# Patient Record
Sex: MALE | Race: WHITE | Employment: FULL TIME | URBAN - METROPOLITAN AREA
[De-identification: names, ages, dates, MRNs, and addresses within clinical notes are randomized per-mention and may not be internally consistent; named-entity substitution may affect disease eponyms.]

---

## 2017-01-05 ENCOUNTER — OFFICE VISIT (OUTPATIENT)
Dept: INTERNAL MEDICINE CLINIC | Facility: CLINIC | Age: 52
End: 2017-01-05

## 2017-01-05 VITALS
SYSTOLIC BLOOD PRESSURE: 128 MMHG | RESPIRATION RATE: 16 BRPM | HEART RATE: 84 BPM | HEIGHT: 72 IN | BODY MASS INDEX: 35.76 KG/M2 | WEIGHT: 264 LBS | DIASTOLIC BLOOD PRESSURE: 78 MMHG

## 2017-01-05 DIAGNOSIS — Z51.81 ENCOUNTER FOR THERAPEUTIC DRUG MONITORING: Primary | ICD-10-CM

## 2017-01-05 DIAGNOSIS — E66.9 OBESITY (BMI 30-39.9): ICD-10-CM

## 2017-01-05 DIAGNOSIS — R73.03 PRE-DIABETES: ICD-10-CM

## 2017-01-05 PROCEDURE — 99213 OFFICE O/P EST LOW 20 MIN: CPT | Performed by: INTERNAL MEDICINE

## 2017-01-05 RX ORDER — PHENTERMINE HYDROCHLORIDE 15 MG/1
15 CAPSULE ORAL EVERY MORNING
Qty: 30 CAPSULE | Refills: 2 | Status: CANCELLED | OUTPATIENT
Start: 2017-01-05

## 2017-01-05 RX ORDER — PHENTERMINE HYDROCHLORIDE 37.5 MG/1
37.5 TABLET ORAL
Qty: 30 TABLET | Refills: 0 | Status: SHIPPED | OUTPATIENT
Start: 2017-01-05 | End: 2017-02-02

## 2017-01-05 NOTE — PROGRESS NOTES
CC: Patient presents with:  Weight Check: up 26 lb       HPI:   Obesity, doing well on phentermine, topamax and metformin, no chest pain. Worsening hunger.        Current Outpatient Prescriptions:  Phentermine HCl 37.5 MG Oral Tab Take 1 tablet (37.5 mg Sp lithotripsy may July 2013   • Other and unspecified hyperlipidemia    • Extrinsic asthma, unspecified    • Prediabetes    • Unspecified sleep apnea    • Visual impairment    • Colitis    • Esophageal reflux    • Histoplasmosis 1.2015   • Mycobacterial months, stopped belviq as he didn't think it was helping, stopped wellbutrin due to mood swings, will cont metformin 3 tabs.  Will cont low dose topamax, due to hx of kidney stones, although only one episode in his life time.       The patient indicates und

## 2017-02-01 ENCOUNTER — TELEPHONE (OUTPATIENT)
Dept: INTERNAL MEDICINE CLINIC | Facility: CLINIC | Age: 52
End: 2017-02-01

## 2017-02-02 ENCOUNTER — OFFICE VISIT (OUTPATIENT)
Dept: INTERNAL MEDICINE CLINIC | Facility: CLINIC | Age: 52
End: 2017-02-02

## 2017-02-02 VITALS
WEIGHT: 254 LBS | RESPIRATION RATE: 16 BRPM | HEIGHT: 72 IN | BODY MASS INDEX: 34.4 KG/M2 | HEART RATE: 78 BPM | SYSTOLIC BLOOD PRESSURE: 118 MMHG | DIASTOLIC BLOOD PRESSURE: 78 MMHG

## 2017-02-02 DIAGNOSIS — E66.9 OBESITY (BMI 30-39.9): ICD-10-CM

## 2017-02-02 DIAGNOSIS — R73.03 PRE-DIABETES: ICD-10-CM

## 2017-02-02 DIAGNOSIS — Z51.81 ENCOUNTER FOR THERAPEUTIC DRUG MONITORING: Primary | ICD-10-CM

## 2017-02-02 PROCEDURE — 99213 OFFICE O/P EST LOW 20 MIN: CPT | Performed by: INTERNAL MEDICINE

## 2017-02-02 RX ORDER — PHENTERMINE HYDROCHLORIDE 37.5 MG/1
37.5 TABLET ORAL
Qty: 30 TABLET | Refills: 0 | Status: SHIPPED | OUTPATIENT
Start: 2017-02-02 | End: 2017-03-02

## 2017-02-02 NOTE — PROGRESS NOTES
CC: Patient presents with:  Weight Check: down 10 lb       HPI:   Obesity, doing well on phentermine, topamax and metformin. No chest pain.        Current Outpatient Prescriptions:  Phentermine HCl 37.5 MG Oral Tab Take 1 tablet (37.5 mg total) by lauren • Other and unspecified hyperlipidemia    • Extrinsic asthma, unspecified    • Prediabetes    • Unspecified sleep apnea    • Visual impairment    • Colitis    • Esophageal reflux    • Histoplasmosis 1.2015   • Mycobacterial infection 3/4/2015     2015 metformin. Pt with 38 lbs weight loss total. Will cont phentermine 37.5mg and do monthly for 3 months, stopped belviq as he didn't think it was helping, stopped wellbutrin due to mood swings, will cont metformin 3 tabs.  Will cont low dose topamax, due to h

## 2017-02-03 NOTE — TELEPHONE ENCOUNTER
Requesting topiramate  LOV: 2/2/17  RTC: 1 month  Last Labs: 8/13/16  Filled: 12/14/16  #90 with 0 refills    Future Appointments  Date Time Provider Alba Skinner   3/2/2017 3:15 PM Maxwell Rivera MD [address]   3/30/2017 3:30 PM Maxwell Rivera

## 2017-02-05 RX ORDER — TOPIRAMATE 25 MG/1
TABLET ORAL
Qty: 180 TABLET | Refills: 0 | Status: SHIPPED | OUTPATIENT
Start: 2017-02-05 | End: 2017-07-12

## 2017-02-23 NOTE — TELEPHONE ENCOUNTER
Future Appointments  Date Time Provider Alba Skinner   3/2/2017 3:15 PM Zoe Hollis MD [facility]   3/30/2017 3:30 PM Zoe Hollis MD [facility]   5/25/2017 3:00 PM [facility]     LMOVM to inform pt that

## 2017-02-27 RX ORDER — AMLODIPINE BESYLATE AND BENAZEPRIL HYDROCHLORIDE 10; 40 MG/1; MG/1
1 CAPSULE ORAL DAILY
Qty: 90 CAPSULE | Refills: 1 | Status: SHIPPED | OUTPATIENT
Start: 2017-02-27 | End: 2017-09-13

## 2017-02-27 NOTE — TELEPHONE ENCOUNTER
Requesting Amlodipine  LOV: 8/2016  RTC: none specified   Last Labs: 8/2016  Filled: 8/25/16 #90 with 1 refills    Future Appointments  Date Time Provider Alba Skinner   3/2/2017 3:15 PM Savannah Mckenzie MD [address]   3/30/2017 3:30 PM Angelique

## 2017-03-02 ENCOUNTER — OFFICE VISIT (OUTPATIENT)
Dept: INTERNAL MEDICINE CLINIC | Facility: CLINIC | Age: 52
End: 2017-03-02

## 2017-03-02 VITALS
BODY MASS INDEX: 33.86 KG/M2 | HEART RATE: 68 BPM | RESPIRATION RATE: 16 BRPM | WEIGHT: 250 LBS | SYSTOLIC BLOOD PRESSURE: 122 MMHG | HEIGHT: 72 IN | DIASTOLIC BLOOD PRESSURE: 70 MMHG

## 2017-03-02 DIAGNOSIS — R73.03 PRE-DIABETES: ICD-10-CM

## 2017-03-02 DIAGNOSIS — Z51.81 ENCOUNTER FOR THERAPEUTIC DRUG MONITORING: Primary | ICD-10-CM

## 2017-03-02 DIAGNOSIS — E66.9 OBESITY (BMI 30-39.9): ICD-10-CM

## 2017-03-02 PROCEDURE — 99213 OFFICE O/P EST LOW 20 MIN: CPT | Performed by: INTERNAL MEDICINE

## 2017-03-02 RX ORDER — PHENTERMINE HYDROCHLORIDE 37.5 MG/1
37.5 TABLET ORAL
Qty: 30 TABLET | Refills: 0 | Status: SHIPPED | OUTPATIENT
Start: 2017-03-02 | End: 2017-05-16 | Stop reason: ALTCHOICE

## 2017-03-02 NOTE — PROGRESS NOTES
CC: Patient presents with:  Weight Check: down 4 lb       HPI:   Obesity, doing well on phentermine, jardiance, metformin and topamax. No chest pain.        Current Outpatient Prescriptions:  Phentermine HCl 37.5 MG Oral Tab Take 1 tablet (37.5 mg tot • Asthma 6/19/2014   • Allergic rhinitis 6/19/2014   • Pre-diabetes 6/19/2014   • Kidney stone 6/19/2014     Sp lithotripsy may July 2013   • Other and unspecified hyperlipidemia    • Extrinsic asthma, unspecified    • Prediabetes    • Unspecified sleep (BMI 30-39. 9)  Pre-diabetes     PLAN:  1. Obesity (BMI 30-39.9)/prediabets, pt with 14 lbs wt loss on 2 months of phentermine, jardiance topamax and metformin.  Pt with 42 lbs weight loss total. Will cont phentermine 37.5mg and do monthly for 3 months, stop

## 2017-03-06 NOTE — TELEPHONE ENCOUNTER
Requesting uloric  LOV:  8/19/16  RTC:   Last Labs:   Filled: 11/7/16  # 30 with 5 refills    Future Appointments  Date Time Provider Alba Skinner   3/30/2017 3:30 PM Saeed Garcia MD [address]   5/9/2017 3:15 PM Saeed Garcia MD [facility]

## 2017-03-07 RX ORDER — FEBUXOSTAT 40 MG/1
TABLET ORAL
Qty: 30 TABLET | Refills: 5 | Status: SHIPPED | OUTPATIENT
Start: 2017-03-07 | End: 2017-07-10

## 2017-03-22 NOTE — TELEPHONE ENCOUNTER
Diabetic Medication Protocol Failed3/22 12:49 PM   HgBA1C procedure resulted in past 6 months    Last HgBA1C < 7.5    Appointment in past 6 or next 3 months      Requesting Jardiance  LOV: 3/2/17 wlc   RTC: 4 wks  Last Labs: 8/13/16  Filled: 03/2/17 #30 wi

## 2017-03-30 ENCOUNTER — OFFICE VISIT (OUTPATIENT)
Dept: INTERNAL MEDICINE CLINIC | Facility: CLINIC | Age: 52
End: 2017-03-30

## 2017-03-30 VITALS
SYSTOLIC BLOOD PRESSURE: 122 MMHG | BODY MASS INDEX: 34.4 KG/M2 | DIASTOLIC BLOOD PRESSURE: 60 MMHG | WEIGHT: 254 LBS | HEART RATE: 60 BPM | HEIGHT: 72 IN | RESPIRATION RATE: 16 BRPM

## 2017-03-30 DIAGNOSIS — Z51.81 ENCOUNTER FOR THERAPEUTIC DRUG MONITORING: Primary | ICD-10-CM

## 2017-03-30 DIAGNOSIS — E66.9 OBESITY (BMI 30-39.9): ICD-10-CM

## 2017-03-30 DIAGNOSIS — R73.03 PRE-DIABETES: ICD-10-CM

## 2017-03-30 PROCEDURE — 99213 OFFICE O/P EST LOW 20 MIN: CPT | Performed by: INTERNAL MEDICINE

## 2017-03-30 RX ORDER — PHENTERMINE HYDROCHLORIDE 37.5 MG/1
37.5 TABLET ORAL
Qty: 30 TABLET | Refills: 0 | Status: CANCELLED | OUTPATIENT
Start: 2017-03-30

## 2017-03-30 RX ORDER — PHENTERMINE HYDROCHLORIDE 15 MG/1
15 CAPSULE ORAL EVERY MORNING
Qty: 30 CAPSULE | Refills: 2 | Status: SHIPPED | OUTPATIENT
Start: 2017-03-30 | End: 2017-06-27

## 2017-03-30 NOTE — PROGRESS NOTES
CC: Patient presents with:  Weight Check: up 4 lb       HPI:   Obesity, doing doing well on phentermine, jardiance topamax and metformin. No chest pain.        Current Outpatient Prescriptions:  Phentermine HCl 15 MG Oral Cap Take 1 capsule (15 mg total ([facility] 6/19/2014   • Asthma 6/19/2014   • Allergic rhinitis 6/19/2014   • Pre-diabetes 6/19/2014   • Kidney stone 6/19/2014     Sp lithotripsy may July 2013   • Other and unspecified hyperlipidemia    • Extrinsic asthma, unspecified    • Prediabetes    • Un (primary encounter diagnosis)  Obesity (BMI 30-39. 9)  Pre-diabetes     PLAN:  1. Obesity (BMI 30-39.9)/prediabets, pt with 10 lbs wt loss on 3 months of phentermine, jardiance topamax and metformin.  Pt with 38 lbs weight loss total. Will reduce phentermine

## 2017-04-19 RX ORDER — LOSARTAN POTASSIUM 100 MG/1
TABLET ORAL
Qty: 90 TABLET | Refills: 0 | Status: SHIPPED | OUTPATIENT
Start: 2017-04-19 | End: 2017-08-18

## 2017-04-19 NOTE — TELEPHONE ENCOUNTER
Requesting Losartan  LOV: 8/19/16  RTC: not noted  Last Labs: 8/13/16  Filled: 10/25/16 #90 with 1 refills    Future Appointments  Date Time Provider Alba Skinner   6/27/2017 3:00 PM Mallorie Hodges MD [facility]   7/25/2017 3:00 PM Mallorie Hodges

## 2017-05-16 ENCOUNTER — OFFICE VISIT (OUTPATIENT)
Dept: FAMILY MEDICINE CLINIC | Facility: CLINIC | Age: 52
End: 2017-05-16

## 2017-05-16 VITALS
HEIGHT: 72 IN | WEIGHT: 254 LBS | SYSTOLIC BLOOD PRESSURE: 126 MMHG | HEART RATE: 78 BPM | TEMPERATURE: 98 F | DIASTOLIC BLOOD PRESSURE: 74 MMHG | BODY MASS INDEX: 34.4 KG/M2 | RESPIRATION RATE: 16 BRPM

## 2017-05-16 DIAGNOSIS — M25.521 RIGHT ELBOW PAIN: Primary | ICD-10-CM

## 2017-05-16 PROCEDURE — 99214 OFFICE O/P EST MOD 30 MIN: CPT | Performed by: PHYSICIAN ASSISTANT

## 2017-05-16 RX ORDER — EMPAGLIFLOZIN 25 MG/1
1 TABLET, FILM COATED ORAL DAILY
COMMUNITY
Start: 2017-03-30 | End: 2017-08-24

## 2017-05-16 RX ORDER — HYDROCODONE BITARTRATE AND ACETAMINOPHEN 5; 325 MG/1; MG/1
1 TABLET ORAL EVERY 8 HOURS PRN
Qty: 45 TABLET | Refills: 0 | Status: SHIPPED | OUTPATIENT
Start: 2017-05-16 | End: 2017-09-12 | Stop reason: ALTCHOICE

## 2017-05-16 NOTE — PROGRESS NOTES
[facility] Internal Medicine Progress Note    CC:  Patient presents with:  Arm Pain: right x 2 & 1/2 months. No known injury. Taking Tylenol as needed, no relief.       HPI:   HPI  R arm pain x 2 months  Pt has been having pain in his lower bicep into the lungs every 4 (four) hours as needed for Wheezing. Disp: 1 Inhaler Rfl: 6   Glycerin-Hypromellose- (VISINE TEARS OP) 1 drop by Each eye route 4 (four) times daily as needed.  Disp:  Rfl:    aspirin (ASPIR-81) 81 MG Oral Tab EC Take 81 mg by tendon rupture  Will check MRI of R elbow  Pt to follow-up with ortho, referral given  [place] prn for pain  RTC in 1 month, sooner if problems   No orders of the defined types were placed in this encounter.        Meds & Refills for this Visit:  Signed Presc

## 2017-05-16 NOTE — PATIENT INSTRUCTIONS
Thank you for choosing Bee Ortez PA-C at [place] [address]  To Do: Alba Guerrier to get MRI  2. Start medication as prescribed  3.  Pt to follow-up ortho, referral given    • Please signup for MY CHART, which is electronic access to and to improve your quality of life.     Referrals, and Radiology Information:    If your insurance requires a referral to a specialist, please allow 5 business days to process your referral request.    If Gauri Fong PA-C orders a CT or MRI, it may t

## 2017-05-17 ENCOUNTER — PATIENT MESSAGE (OUTPATIENT)
Dept: INTERNAL MEDICINE CLINIC | Facility: CLINIC | Age: 52
End: 2017-05-17

## 2017-05-17 ENCOUNTER — PATIENT MESSAGE (OUTPATIENT)
Dept: FAMILY MEDICINE CLINIC | Facility: CLINIC | Age: 52
End: 2017-05-17

## 2017-05-18 NOTE — TELEPHONE ENCOUNTER
From: Corrinne Farrier. To: Roni Brown  Sent: 5/17/2017 6:06 PM CDT  Subject: Other    Changing pharmacy to:  [facility]  [address] [facility] [place]

## 2017-05-18 NOTE — TELEPHONE ENCOUNTER
From: Prema Gates. To: Claudette Rodriges MD  Sent: 5/17/2017 6:08 PM CDT  Subject: Other    Changing pharmacy to:  [facility]  [address]

## 2017-05-18 NOTE — TELEPHONE ENCOUNTER
From: Dandre Wood.   To: Elie Davila MD  Sent: 5/17/2017 6:04 PM CDT  Subject: Other    Changing pharmacy to :  [facility]  [address]  [place].

## 2017-05-22 ENCOUNTER — HOSPITAL ENCOUNTER (OUTPATIENT)
Dept: MRI IMAGING | Age: 52
Discharge: HOME | End: 2017-05-22
Attending: PHYSICIAN ASSISTANT
Payer: COMMERCIAL

## 2017-05-22 ENCOUNTER — APPOINTMENT (OUTPATIENT)
Dept: LAB | Age: 52
End: 2017-05-22
Attending: INTERNAL MEDICINE
Payer: COMMERCIAL

## 2017-05-22 DIAGNOSIS — Z51.81 ENCOUNTER FOR THERAPEUTIC DRUG MONITORING: ICD-10-CM

## 2017-05-22 DIAGNOSIS — M25.521 RIGHT ELBOW PAIN: ICD-10-CM

## 2017-05-22 DIAGNOSIS — E66.9 OBESITY (BMI 30-39.9): ICD-10-CM

## 2017-05-22 DIAGNOSIS — R73.03 PRE-DIABETES: ICD-10-CM

## 2017-05-22 PROCEDURE — 73221 MRI JOINT UPR EXTREM W/O DYE: CPT | Performed by: PHYSICIAN ASSISTANT

## 2017-05-22 PROCEDURE — 36415 COLL VENOUS BLD VENIPUNCTURE: CPT | Performed by: INTERNAL MEDICINE

## 2017-05-22 PROCEDURE — 80048 BASIC METABOLIC PNL TOTAL CA: CPT | Performed by: INTERNAL MEDICINE

## 2017-05-22 PROCEDURE — 82306 VITAMIN D 25 HYDROXY: CPT | Performed by: INTERNAL MEDICINE

## 2017-05-22 PROCEDURE — 82607 VITAMIN B-12: CPT | Performed by: INTERNAL MEDICINE

## 2017-05-30 PROBLEM — M77.11 LATERAL EPICONDYLITIS, RIGHT ELBOW: Status: ACTIVE | Noted: 2017-05-30

## 2017-05-30 PROBLEM — M75.21 BICEPS TENDINITIS ON RIGHT: Status: ACTIVE | Noted: 2017-05-30

## 2017-06-27 ENCOUNTER — OFFICE VISIT (OUTPATIENT)
Dept: INTERNAL MEDICINE CLINIC | Facility: CLINIC | Age: 52
End: 2017-06-27

## 2017-06-27 VITALS
WEIGHT: 265 LBS | RESPIRATION RATE: 16 BRPM | SYSTOLIC BLOOD PRESSURE: 130 MMHG | DIASTOLIC BLOOD PRESSURE: 78 MMHG | BODY MASS INDEX: 35.89 KG/M2 | HEIGHT: 72 IN | HEART RATE: 78 BPM

## 2017-06-27 DIAGNOSIS — R73.03 PRE-DIABETES: ICD-10-CM

## 2017-06-27 DIAGNOSIS — E66.9 OBESITY (BMI 30-39.9): ICD-10-CM

## 2017-06-27 DIAGNOSIS — Z51.81 ENCOUNTER FOR THERAPEUTIC DRUG MONITORING: Primary | ICD-10-CM

## 2017-06-27 PROCEDURE — 99213 OFFICE O/P EST LOW 20 MIN: CPT | Performed by: INTERNAL MEDICINE

## 2017-06-27 RX ORDER — PHENTERMINE HYDROCHLORIDE 37.5 MG/1
37.5 TABLET ORAL
Qty: 30 TABLET | Refills: 0 | Status: SHIPPED | OUTPATIENT
Start: 2017-06-27 | End: 2017-08-24

## 2017-06-27 NOTE — PROGRESS NOTES
CC: Patient presents with:  Weight Check: up 11 lb       HPI:   Obesity, doing well on phentermine, metformin, topamax and jardiance. Worsening hunger.        Current Outpatient Prescriptions:  Phentermine HCl 37.5 MG Oral Tab Take 1 tablet (37.5 mg tot lesion 1.2015 sp voriconazole ID fu    • Asthma 6/19/2014   • Cavitary lesion of lung    • Colitis    • Esophageal reflux    • Extrinsic asthma, unspecified    • Gout 8/2/2016   • High cholesterol    • Histoplasmosis 1.2015   • Hyperproteinemia    • Kidney 30-39. 9)  Pre-diabetes     PLAN:  1. Obesity (BMI 30-39.9)/prediabets, pt with 11 lbs wt gain on 3 months of phentermine 15mg, jardiance topamax and metformin.  Pt with 27 lbs weight loss total. Will increase phentermine to 37.5mg and refill monthly for 3 m

## 2017-07-06 ENCOUNTER — TELEPHONE (OUTPATIENT)
Dept: FAMILY MEDICINE CLINIC | Facility: CLINIC | Age: 52
End: 2017-07-06

## 2017-07-06 NOTE — TELEPHONE ENCOUNTER
Requesting Topiramate 25mg  LOV: 6/27/17  RTC: 4 weeks  Last Labs: n/a  Filled: 2/5/17 #180 with 0 refills  PLAN:  1. Obesity (BMI 30-39.9)/prediabets, pt with 11 lbs wt gain on 3 months of phentermine 15mg, jardiance topamax and metformin.  Pt with 27 lbs

## 2017-07-06 NOTE — TELEPHONE ENCOUNTER
Pharmacy req refill        Name of Medication: TOPIRAMATE 25 MG Oral     Dose:     How is medication prescribed: TAKE 1 TABLET TWICE A DAY    Specific name of pharmacy and location:[facility] [address]    Name of mail order

## 2017-07-10 ENCOUNTER — TELEPHONE (OUTPATIENT)
Dept: FAMILY MEDICINE CLINIC | Facility: CLINIC | Age: 52
End: 2017-07-10

## 2017-07-10 NOTE — TELEPHONE ENCOUNTER
From: Prema Gates. Sent: 7/10/2017 1:37 PM CDT  Subject: Medication Renewal Request    Toni Trevino. would like a refill of the following medications:  TOPIRAMATE 25 MG Oral Tab Kenna Bro PA-C]    Preferred pharmacy: [facility] [id number] [place]

## 2017-07-10 NOTE — TELEPHONE ENCOUNTER
From: Armida Puente. Sent: 7/10/2017 1:37 PM CDT  Subject: Medication Renewal Request    Marisabel Ames.  Pasquale Diaz. would like a refill of the following medications:  ULORIC 40 MG Oral Tab Tristan Hardin MD]    Preferred pharmacy: [address]

## 2017-07-11 RX ORDER — FEBUXOSTAT 40 MG/1
40 TABLET, FILM COATED ORAL DAILY
Qty: 90 TABLET | Refills: 3 | Status: SHIPPED
Start: 2017-07-11 | End: 2018-10-10

## 2017-07-11 NOTE — TELEPHONE ENCOUNTER
Time started: 1119    Time ended: 1121    Total time spent on chart: 2 mins    Requesting uloric 40mg  LOV: 6/27/17  RTC: 1 month  Last Labs: 5/22/17  Filled: 3/7/17 # 30 with 5 refills    Future Appointments  Date Time Provider Alba Skinner   7/25/20

## 2017-07-12 RX ORDER — TOPIRAMATE 25 MG/1
25 TABLET ORAL 2 TIMES DAILY
Qty: 180 TABLET | Refills: 0 | Status: SHIPPED
Start: 2017-07-12 | End: 2017-07-25

## 2017-07-12 RX ORDER — TOPIRAMATE 25 MG/1
25 TABLET ORAL 2 TIMES DAILY
Qty: 180 TABLET | Refills: 0
Start: 2017-07-12

## 2017-07-12 NOTE — TELEPHONE ENCOUNTER
From: Vola Phoenix. Sent: 7/12/2017 4:46 PM CDT  Subject: Medication Renewal Request    Jcjewell Pam.  Michael Sheikh. would like a refill of the following medications:  TOPIRAMATE 25 MG Oral Tab Zacarias Tovar PA-C]    Preferred pharmacy: [facility] [address] [place]

## 2017-07-12 NOTE — TELEPHONE ENCOUNTER
Requesting Topiramate  LOV: 6/27/17  RTC: 4 weeks  Last Labs: n/a  Filled: 2/5/17 #180 with 0 refills    Future Appointments  Date Time Provider Alba Skinner   7/25/2017 3:00 PM Claudio Lynn MD [address]   8/24/2017 3:30 PM Claudio Lynn MD

## 2017-07-25 ENCOUNTER — OFFICE VISIT (OUTPATIENT)
Dept: INTERNAL MEDICINE CLINIC | Facility: CLINIC | Age: 52
End: 2017-07-25

## 2017-07-25 VITALS
WEIGHT: 263 LBS | HEART RATE: 84 BPM | BODY MASS INDEX: 35.62 KG/M2 | HEIGHT: 72 IN | SYSTOLIC BLOOD PRESSURE: 122 MMHG | DIASTOLIC BLOOD PRESSURE: 70 MMHG | RESPIRATION RATE: 16 BRPM

## 2017-07-25 DIAGNOSIS — E66.9 OBESITY (BMI 30-39.9): ICD-10-CM

## 2017-07-25 DIAGNOSIS — Z51.81 ENCOUNTER FOR THERAPEUTIC DRUG MONITORING: Primary | ICD-10-CM

## 2017-07-25 DIAGNOSIS — R73.03 PRE-DIABETES: ICD-10-CM

## 2017-07-25 PROCEDURE — 99213 OFFICE O/P EST LOW 20 MIN: CPT | Performed by: INTERNAL MEDICINE

## 2017-07-25 RX ORDER — PHENTERMINE HYDROCHLORIDE 37.5 MG/1
37.5 TABLET ORAL
Qty: 30 TABLET | Refills: 0 | Status: CANCELLED | OUTPATIENT
Start: 2017-07-25

## 2017-07-25 RX ORDER — PHENDIMETRAZINE TARTRATE 105 MG/1
CAPSULE, EXTENDED RELEASE ORAL
Qty: 30 CAPSULE | Refills: 0 | Status: SHIPPED | OUTPATIENT
Start: 2017-07-25 | End: 2017-08-24

## 2017-07-25 RX ORDER — TOPIRAMATE 50 MG/1
50 TABLET, FILM COATED ORAL 2 TIMES DAILY
Qty: 60 TABLET | Refills: 5 | Status: SHIPPED | OUTPATIENT
Start: 2017-07-25 | End: 2017-09-12

## 2017-07-25 NOTE — PROGRESS NOTES
CC: Patient presents with:  Weight Check: down 2 lb       HPI:   Obesity, doing well on phentermine, metformin, topamax and jardiance. Still with cravings. No chest pain.        Current Outpatient Prescriptions:  topiramate 50 MG Oral Tab Take 1 tablet mouth daily.  Disp:  Rfl:       Past Medical History:   Diagnosis Date   • Allergic rhinitis 6/19/2014   • Aspergillosis, with pneumonia ([facility] [address] 2/3/2015    RUL cavitary lesion 1.2015 sp voriconazole ID fu    • Asthma 6/19/2014   • Cavitary lesion of lung Tartrate  MG Oral Capsule SR 24 Hr 30 capsule 0      Sig: Take 1 cap daily          None     ASSESSMENT:   Encounter for therapeutic drug monitoring  (primary encounter diagnosis)  Obesity (BMI 30-39. 9)  Pre-diabetes     PLAN:  1. Obesity (BMI 30-39.

## 2017-07-27 RX ORDER — TOPIRAMATE 25 MG/1
25 TABLET ORAL 2 TIMES DAILY
Qty: 180 TABLET | Refills: 0 | Status: SHIPPED | OUTPATIENT
Start: 2017-07-27 | End: 2017-08-24

## 2017-08-18 ENCOUNTER — TELEPHONE (OUTPATIENT)
Dept: FAMILY MEDICINE CLINIC | Facility: CLINIC | Age: 52
End: 2017-08-18

## 2017-08-18 RX ORDER — LOSARTAN POTASSIUM 100 MG/1
100 TABLET ORAL
Qty: 90 TABLET | Refills: 1 | Status: SHIPPED | OUTPATIENT
Start: 2017-08-18 | End: 2018-02-22

## 2017-08-18 NOTE — TELEPHONE ENCOUNTER
Refill request received via fax for Losartan 100mg  Requesting Losartan 100mg  LOV: 7/25/17  RTC: 4 weeks  Last Labs: 5/22/17  Filled: 4/19/17 #90 with 0 refills    Future Appointments  Date Time Provider Alba Skinner   8/24/2017 3:30 PM Stephen Sewell,

## 2017-08-23 ENCOUNTER — TELEPHONE (OUTPATIENT)
Dept: FAMILY MEDICINE CLINIC | Facility: CLINIC | Age: 52
End: 2017-08-23

## 2017-08-23 DIAGNOSIS — I10 HYPERTENSION, UNSPECIFIED TYPE: ICD-10-CM

## 2017-08-23 DIAGNOSIS — M10.9 GOUT, UNSPECIFIED CAUSE, UNSPECIFIED CHRONICITY, UNSPECIFIED SITE: ICD-10-CM

## 2017-08-23 DIAGNOSIS — R80.9 PROTEINURIA, UNSPECIFIED TYPE: ICD-10-CM

## 2017-08-23 DIAGNOSIS — E29.1 HYPOGONADISM IN MALE: ICD-10-CM

## 2017-08-23 DIAGNOSIS — E78.5 HYPERLIPIDEMIA, UNSPECIFIED HYPERLIPIDEMIA TYPE: Primary | ICD-10-CM

## 2017-08-23 DIAGNOSIS — M13.0 POLYARTICULAR ARTHRITIS: ICD-10-CM

## 2017-08-23 DIAGNOSIS — R73.03 PREDIABETES: ICD-10-CM

## 2017-08-23 NOTE — TELEPHONE ENCOUNTER
Patient notified, verbalized understanding.      Time started: 0928    Time ended: 0930    Total time spent on chart: 2 min

## 2017-08-23 NOTE — TELEPHONE ENCOUNTER
Pt req annual lab orders, has physical scheduled with Dr Jaren Cotton on 9/12/17 and would like to have done prior to apt.

## 2017-08-23 NOTE — TELEPHONE ENCOUNTER
Requesting Lab orders  LOV: 7/25/17  RTC: 4 weeks  Last Labs: some labs 5/17    Future Appointments  Date Time Provider Alba Susanne   8/24/2017 3:30 PM Feliciano Carr MD [facility]   9/12/2017 3:30 PM Braxton Giles MD [facility] [address] [facility] [address]   9/

## 2017-08-24 ENCOUNTER — OFFICE VISIT (OUTPATIENT)
Dept: INTERNAL MEDICINE CLINIC | Facility: CLINIC | Age: 52
End: 2017-08-24

## 2017-08-24 VITALS
HEIGHT: 72 IN | WEIGHT: 260 LBS | SYSTOLIC BLOOD PRESSURE: 118 MMHG | BODY MASS INDEX: 35.21 KG/M2 | RESPIRATION RATE: 16 BRPM | HEART RATE: 88 BPM | DIASTOLIC BLOOD PRESSURE: 78 MMHG

## 2017-08-24 DIAGNOSIS — R73.03 PRE-DIABETES: ICD-10-CM

## 2017-08-24 DIAGNOSIS — Z51.81 ENCOUNTER FOR THERAPEUTIC DRUG MONITORING: Primary | ICD-10-CM

## 2017-08-24 DIAGNOSIS — E66.9 OBESITY (BMI 30-39.9): ICD-10-CM

## 2017-08-24 PROCEDURE — 99213 OFFICE O/P EST LOW 20 MIN: CPT | Performed by: INTERNAL MEDICINE

## 2017-08-24 RX ORDER — PHENDIMETRAZINE TARTRATE 105 MG/1
CAPSULE, EXTENDED RELEASE ORAL
Qty: 30 CAPSULE | Refills: 0 | Status: SHIPPED | OUTPATIENT
Start: 2017-08-24 | End: 2017-09-21

## 2017-08-24 NOTE — PROGRESS NOTES
CC: Patient presents with:  Weight Check: down 3 lb       HPI:   Obesity, doing well on phendimetrazine, topamax and metformin. No chest pain. Stopped jardiance because of freq of urination.        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  Will cont topamax 50mg bid, hx of kidney stones, although only one episode in his lif

## 2017-08-31 RX ORDER — FEBUXOSTAT 40 MG/1
TABLET ORAL
Qty: 30 TABLET | Refills: 5 | OUTPATIENT
Start: 2017-08-31

## 2017-08-31 NOTE — TELEPHONE ENCOUNTER
Medication Detail    Disp Refills Start End    febuxostat (ULORIC) 40 MG Oral Tab 90 tablet 3 7/11/2017     Sig - Route:  Take 1 tablet (40 mg total) by mouth daily. - Oral    Class: Script not printed    E-Prescribing Status: Receipt confirmed by pharmacy

## 2017-09-09 ENCOUNTER — LAB ENCOUNTER (OUTPATIENT)
Dept: LAB | Age: 52
End: 2017-09-09
Attending: INTERNAL MEDICINE
Payer: COMMERCIAL

## 2017-09-09 DIAGNOSIS — M10.9 GOUT, UNSPECIFIED CAUSE, UNSPECIFIED CHRONICITY, UNSPECIFIED SITE: ICD-10-CM

## 2017-09-09 DIAGNOSIS — R80.9 PROTEINURIA, UNSPECIFIED TYPE: ICD-10-CM

## 2017-09-09 DIAGNOSIS — I10 HYPERTENSION, UNSPECIFIED TYPE: ICD-10-CM

## 2017-09-09 DIAGNOSIS — E78.5 HYPERLIPIDEMIA, UNSPECIFIED HYPERLIPIDEMIA TYPE: ICD-10-CM

## 2017-09-09 DIAGNOSIS — E29.1 HYPOGONADISM IN MALE: ICD-10-CM

## 2017-09-09 DIAGNOSIS — M13.0 POLYARTICULAR ARTHRITIS: ICD-10-CM

## 2017-09-09 DIAGNOSIS — R73.03 PREDIABETES: ICD-10-CM

## 2017-09-09 LAB
ALBUMIN SERPL-MCNC: 3.9 G/DL (ref 3.5–4.8)
ALP LIVER SERPL-CCNC: 54 U/L (ref 45–117)
ALT SERPL-CCNC: 35 U/L (ref 17–63)
AST SERPL-CCNC: 23 U/L (ref 15–41)
BASOPHILS # BLD AUTO: 0.07 X10(3) UL (ref 0–0.1)
BASOPHILS NFR BLD AUTO: 1 %
BILIRUB SERPL-MCNC: 0.4 MG/DL (ref 0.1–2)
BILIRUB UR QL STRIP.AUTO: NEGATIVE
BUN BLD-MCNC: 13 MG/DL (ref 8–20)
CALCIUM BLD-MCNC: 8.9 MG/DL (ref 8.3–10.3)
CHLORIDE: 107 MMOL/L (ref 101–111)
CHOLEST SMN-MCNC: 225 MG/DL (ref ?–200)
CLARITY UR REFRACT.AUTO: CLEAR
CO2: 21 MMOL/L (ref 22–32)
COLOR UR AUTO: YELLOW
COMPLEXED PSA SERPL-MCNC: 0.65 NG/ML (ref 0.01–4)
CREAT BLD-MCNC: 0.8 MG/DL (ref 0.7–1.3)
EOSINOPHIL # BLD AUTO: 0.21 X10(3) UL (ref 0–0.3)
EOSINOPHIL NFR BLD AUTO: 3 %
ERYTHROCYTE [DISTWIDTH] IN BLOOD BY AUTOMATED COUNT: 13.2 % (ref 11.5–16)
EST. AVERAGE GLUCOSE BLD GHB EST-MCNC: 126 MG/DL (ref 68–126)
GLUCOSE BLD-MCNC: 105 MG/DL (ref 70–99)
GLUCOSE UR STRIP.AUTO-MCNC: NEGATIVE MG/DL
HBA1C MFR BLD HPLC: 6 % (ref ?–5.7)
HCT VFR BLD AUTO: 56.4 % (ref 37–53)
HDLC SERPL-MCNC: 49 MG/DL (ref 45–?)
HDLC SERPL: 4.59 {RATIO} (ref ?–4.97)
HGB BLD-MCNC: 18.5 G/DL (ref 13–17)
IMMATURE GRANULOCYTE COUNT: 0.06 X10(3) UL (ref 0–1)
IMMATURE GRANULOCYTE RATIO %: 0.8 %
KETONES UR STRIP.AUTO-MCNC: NEGATIVE MG/DL
LDLC SERPL CALC-MCNC: 147 MG/DL (ref ?–130)
LDLC SERPL-MCNC: 29 MG/DL (ref 5–40)
LEUKOCYTE ESTERASE UR QL STRIP.AUTO: NEGATIVE
LYMPHOCYTES # BLD AUTO: 1.82 X10(3) UL (ref 0.9–4)
LYMPHOCYTES NFR BLD AUTO: 25.7 %
M PROTEIN MFR SERPL ELPH: 7.9 G/DL (ref 6.1–8.3)
MCH RBC QN AUTO: 31.3 PG (ref 27–33.2)
MCHC RBC AUTO-ENTMCNC: 32.8 G/DL (ref 31–37)
MCV RBC AUTO: 95.4 FL (ref 80–99)
MONOCYTES # BLD AUTO: 0.53 X10(3) UL (ref 0.1–0.6)
MONOCYTES NFR BLD AUTO: 7.5 %
NEUTROPHIL ABS PRELIM: 4.38 X10 (3) UL (ref 1.3–6.7)
NEUTROPHILS # BLD AUTO: 4.38 X10(3) UL (ref 1.3–6.7)
NEUTROPHILS NFR BLD AUTO: 62 %
NITRITE UR QL STRIP.AUTO: NEGATIVE
NONHDLC SERPL-MCNC: 176 MG/DL (ref ?–130)
PH UR STRIP.AUTO: 5 [PH] (ref 4.5–8)
PLATELET # BLD AUTO: 171 10(3)UL (ref 150–450)
POTASSIUM SERPL-SCNC: 4.3 MMOL/L (ref 3.6–5.1)
PROT UR STRIP.AUTO-MCNC: NEGATIVE MG/DL
RBC # BLD AUTO: 5.91 X10(6)UL (ref 4.3–5.7)
RBC UR QL AUTO: NEGATIVE
RED CELL DISTRIBUTION WIDTH-SD: 46.5 FL (ref 35.1–46.3)
SODIUM SERPL-SCNC: 137 MMOL/L (ref 136–144)
SP GR UR STRIP.AUTO: 1.02 (ref 1–1.03)
TESTOSTERONE: 157.2 NG/DL (ref 241–827)
TRIGLYCERIDES: 143 MG/DL (ref ?–150)
URIC ACID: 4.5 MG/DL (ref 2.4–8.7)
UROBILINOGEN UR STRIP.AUTO-MCNC: <2 MG/DL
WBC # BLD AUTO: 7.1 X10(3) UL (ref 4–13)

## 2017-09-09 PROCEDURE — 36415 COLL VENOUS BLD VENIPUNCTURE: CPT | Performed by: INTERNAL MEDICINE

## 2017-09-09 PROCEDURE — 84403 ASSAY OF TOTAL TESTOSTERONE: CPT | Performed by: INTERNAL MEDICINE

## 2017-09-09 PROCEDURE — 84153 ASSAY OF PSA TOTAL: CPT | Performed by: INTERNAL MEDICINE

## 2017-09-09 PROCEDURE — 81003 URINALYSIS AUTO W/O SCOPE: CPT | Performed by: INTERNAL MEDICINE

## 2017-09-09 PROCEDURE — 85025 COMPLETE CBC W/AUTO DIFF WBC: CPT | Performed by: INTERNAL MEDICINE

## 2017-09-09 PROCEDURE — 80061 LIPID PANEL: CPT | Performed by: INTERNAL MEDICINE

## 2017-09-09 PROCEDURE — 80053 COMPREHEN METABOLIC PANEL: CPT | Performed by: INTERNAL MEDICINE

## 2017-09-09 PROCEDURE — 84550 ASSAY OF BLOOD/URIC ACID: CPT | Performed by: INTERNAL MEDICINE

## 2017-09-09 PROCEDURE — 83036 HEMOGLOBIN GLYCOSYLATED A1C: CPT | Performed by: INTERNAL MEDICINE

## 2017-09-10 PROBLEM — D75.1 ERYTHROCYTOSIS: Status: ACTIVE | Noted: 2017-09-10

## 2017-09-12 ENCOUNTER — OFFICE VISIT (OUTPATIENT)
Dept: FAMILY MEDICINE CLINIC | Facility: CLINIC | Age: 52
End: 2017-09-12

## 2017-09-12 VITALS
WEIGHT: 264 LBS | HEIGHT: 72 IN | HEART RATE: 80 BPM | RESPIRATION RATE: 16 BRPM | BODY MASS INDEX: 35.76 KG/M2 | DIASTOLIC BLOOD PRESSURE: 80 MMHG | TEMPERATURE: 98 F | SYSTOLIC BLOOD PRESSURE: 134 MMHG

## 2017-09-12 DIAGNOSIS — E78.00 HIGH CHOLESTEROL: ICD-10-CM

## 2017-09-12 DIAGNOSIS — E66.9 OBESITY (BMI 30-39.9): ICD-10-CM

## 2017-09-12 DIAGNOSIS — E66.8 HYPOGONADAL OBESITY: ICD-10-CM

## 2017-09-12 DIAGNOSIS — E78.5 DYSLIPIDEMIA: ICD-10-CM

## 2017-09-12 DIAGNOSIS — N52.9 VASCULOGENIC ERECTILE DYSFUNCTION, UNSPECIFIED VASCULOGENIC ERECTILE DYSFUNCTION TYPE: ICD-10-CM

## 2017-09-12 DIAGNOSIS — R73.01 IFG (IMPAIRED FASTING GLUCOSE): ICD-10-CM

## 2017-09-12 DIAGNOSIS — D75.1 ERYTHROCYTOSIS: Primary | ICD-10-CM

## 2017-09-12 DIAGNOSIS — F33.0 DEPRESSION, MAJOR, RECURRENT, MILD (HCC): ICD-10-CM

## 2017-09-12 DIAGNOSIS — K51.90 ULCERATIVE COLITIS WITHOUT COMPLICATIONS, UNSPECIFIED LOCATION (HCC): ICD-10-CM

## 2017-09-12 DIAGNOSIS — I10 ESSENTIAL HYPERTENSION: ICD-10-CM

## 2017-09-12 PROBLEM — E66.89 HYPOGONADAL OBESITY: Status: ACTIVE | Noted: 2017-09-12

## 2017-09-12 PROCEDURE — 99214 OFFICE O/P EST MOD 30 MIN: CPT | Performed by: INTERNAL MEDICINE

## 2017-09-12 RX ORDER — TESTOSTERONE 12.5 MG/1.25G
1 GEL TOPICAL DAILY
Qty: 30 TUBE | Refills: 3 | Status: SHIPPED
Start: 2017-09-12 | End: 2017-12-21

## 2017-09-12 RX ORDER — CLONAZEPAM 0.5 MG/1
0.5 TABLET ORAL 2 TIMES DAILY PRN
Qty: 60 TABLET | Refills: 3 | Status: SHIPPED
Start: 2017-09-12 | End: 2017-12-11

## 2017-09-12 RX ORDER — SILDENAFIL CITRATE 20 MG/1
20 TABLET ORAL DAILY
Qty: 30 TABLET | Refills: 3 | Status: SHIPPED | OUTPATIENT
Start: 2017-09-12 | End: 2018-01-03

## 2017-09-12 NOTE — PROGRESS NOTES
[facility] Internal Medicine Office Note  Chief Complaint:   Patient presents with:  Obesity: low T, ifg, dyslipid, ED, high rbc  Lab Results: depression      HPI:   This is a 46year old male coming in for  HPI  Multiple issues  Pt c/o his dad i MG/5GM (1%) Transdermal Gel Place 1 Tube onto the skin daily. Apply to shoulders and arms, 1 tube a day Disp: 30 Tube Rfl: 3   ClonazePAM 0.5 MG Oral Tab Take 1 tablet (0.5 mg total) by mouth 2 (two) times daily as needed for Anxiety.  Disp: 60 tablet Rfl: (Temporal)   Resp 16   Ht 72\"   Wt 264 lb   BMI 35.80 kg/m²  Estimated body mass index is 35.8 kg/m² as calculated from the following:    Height as of this encounter: 72\". Weight as of this encounter: 264 lb. Vital signs reviewed.  Appears stated age GASTRO (INTERNAL)    Vasculogenic erectile dysfunction, unspecified vasculogenic erectile dysfunction type chronic stable issue, continue present management with observation and medications as noted  cpm viagra    IFG (impaired fasting glucose) chronic sta due on 08/05/2015  Annual Physical due on 08/03/2016  Colonoscopy,3 Years due on 05/13/2017  Annual Depression Screen due on 08/19/2017  Asthma Action Plan due on 08/19/2017  Asthma Control Test due on 08/19/2017  Influenza Vaccine(1) due on 09/01/2017  Pa

## 2017-09-12 NOTE — PATIENT INSTRUCTIONS
Thank you for choosing Peggy Beckford MD at [place] [id number]  To Do: [address] axiron to testim 1 tube a day to shoulders  2. Start clonazepam for stress as needed  3.  I would recommend to donate blood periodically, high red blood c visit.  For your safety, read the entire package insert of all medicines prescribed to you and be aware of all of the risks of treatment even beyond those discussed today.  All therapies have potential risk of harm or side effects or medication interaction

## 2017-09-13 RX ORDER — TESTOSTERONE 12.5 MG/1.25G
1 GEL TOPICAL DAILY
Qty: 30 TUBE | Refills: 3 | Status: CANCELLED
Start: 2017-09-13

## 2017-09-13 RX ORDER — TOPIRAMATE 50 MG/1
50 TABLET, FILM COATED ORAL 2 TIMES DAILY
Qty: 180 TABLET | Refills: 1 | Status: SHIPPED | OUTPATIENT
Start: 2017-09-13 | End: 2018-03-28

## 2017-09-13 RX ORDER — AMLODIPINE BESYLATE AND BENAZEPRIL HYDROCHLORIDE 10; 40 MG/1; MG/1
1 CAPSULE ORAL DAILY
Qty: 90 CAPSULE | Refills: 0 | Status: SHIPPED | OUTPATIENT
Start: 2017-09-13 | End: 2017-12-11

## 2017-09-13 NOTE — TELEPHONE ENCOUNTER
Hypertension Medications Protocol Passed    Requesting AMLODIPINE BESY-BENAZEPRIL HCL 10-40 MG  LOV: 9/12/17  RTC: 3 mo  Last Labs: 9/9/17  Filled: 2/27/17 #90with 1 refills    Future Appointments  Date Time Provider Alba Skinner   9/21/2017 3:00 PM SUSY

## 2017-09-14 ENCOUNTER — TELEPHONE (OUTPATIENT)
Dept: FAMILY MEDICINE CLINIC | Facility: CLINIC | Age: 52
End: 2017-09-14

## 2017-09-21 NOTE — TELEPHONE ENCOUNTER
Requesting Phendimetrazine Tartrate  MG -Dr Eris Wilkes [facility] pt  LOV: 8/24/17  RTC: 4WKS  Last Labs: wl  Filled: 8/24/17#30with 0 refills    Future Appointments  Date Time Provider Alba Skinner   10/16/2017 3:40 PM NACHO Darnell [facility]

## 2017-09-21 NOTE — TELEPHONE ENCOUNTER
From: Jerzy Parks. Sent: 9/21/2017 10:50 AM CDT  Subject: Medication Renewal Request    Edis Meza.  Yuval Azul. would like a refill of the following medications:     Phendimetrazine Tartrate  MG Oral Capsule SR 24 Hr FAREED Lomas

## 2017-09-22 RX ORDER — PHENDIMETRAZINE TARTRATE 105 MG/1
CAPSULE, EXTENDED RELEASE ORAL
Qty: 30 CAPSULE | Refills: 0
Start: 2017-09-22 | End: 2017-10-16

## 2017-10-16 ENCOUNTER — OFFICE VISIT (OUTPATIENT)
Dept: INTERNAL MEDICINE CLINIC | Facility: CLINIC | Age: 52
End: 2017-10-16

## 2017-10-16 VITALS
WEIGHT: 259 LBS | SYSTOLIC BLOOD PRESSURE: 106 MMHG | RESPIRATION RATE: 16 BRPM | HEIGHT: 70 IN | DIASTOLIC BLOOD PRESSURE: 64 MMHG | BODY MASS INDEX: 37.08 KG/M2 | HEART RATE: 68 BPM

## 2017-10-16 DIAGNOSIS — E66.01 SEVERE OBESITY (BMI 35.0-39.9): ICD-10-CM

## 2017-10-16 DIAGNOSIS — R73.03 PRE-DIABETES: ICD-10-CM

## 2017-10-16 DIAGNOSIS — I10 ESSENTIAL HYPERTENSION: ICD-10-CM

## 2017-10-16 DIAGNOSIS — Z51.81 ENCOUNTER FOR THERAPEUTIC DRUG MONITORING: Primary | ICD-10-CM

## 2017-10-16 DIAGNOSIS — E78.5 DYSLIPIDEMIA: ICD-10-CM

## 2017-10-16 PROCEDURE — 99214 OFFICE O/P EST MOD 30 MIN: CPT | Performed by: NURSE PRACTITIONER

## 2017-10-16 RX ORDER — METFORMIN HYDROCHLORIDE 750 MG/1
2250 TABLET, EXTENDED RELEASE ORAL
Qty: 270 TABLET | Refills: 1 | Status: SHIPPED | OUTPATIENT
Start: 2017-10-16 | End: 2018-03-28

## 2017-10-16 RX ORDER — PHENDIMETRAZINE TARTRATE 105 MG/1
CAPSULE, EXTENDED RELEASE ORAL
Qty: 30 CAPSULE | Refills: 0 | Status: SHIPPED | OUTPATIENT
Start: 2017-10-16 | End: 2017-11-27

## 2017-10-16 NOTE — PATIENT INSTRUCTIONS
Congratulations on your 1# weight loss! Continue making lifestyle changes that focus on good nutrition, regular exercise and stress management. Medication Plan: Continue current medication regimen. Add Victoza daily as directed- sample provided.  Start V tablespoon is about the size of your thumb. One teaspoon is about the size of the tip of your thumb. Keeping track of serving sizes  When you’re planning for a snack or a meal, keep servings in mind.  If you don’t have measuring cups or a scale handy, the Without changes in diet and lifestyle, the problem can get worse. Once you have type 2 diabetes, it is chronic (ongoing) and needs to be managed for the rest of your life.  Diabetes can harm the body and your health by damaging organs, such as your eyes and healthcare provider to make a plan to eat well and be more active. Keep in mind that small changes can add up. Other changes in your lifestyle (or even taking certain medicines, such as metformin) may make you less likely to develop diabetes.  Your [facility]

## 2017-10-16 NOTE — PROGRESS NOTES
Ronak Lewis. is a 46year old male presents today for follow-up on medical weight loss program for the treatment of overweight, obesity, or morbid obesity with associated JAVID, prediabetes, hyperlipidemia, HTN.  Previous patient of Dr. Dulce Patton and [facility] NEURO/MS: motor and sensory grossly intact  PSYCH: pleasant, cooperative, normal mood and affect    ASSESSMENT AND PLAN:  Encounter for therapeutic drug monitoring  (primary encounter diagnosis)  Severe obesity (bmi 35.0-39.9) (hcc)  Essential hypertension Medication Plan: Continue current medication regimen. Add Victoza daily as directed- sample provided. Start VSL#3 probiotic, 1 capsule daily with water.     Agreed upon goal/s before next office visit include: I do not recommend ketogenic diet with current When you’re planning for a snack or a meal, keep servings in mind. If you don’t have measuring cups or a scale handy, there are ways to [facility] serving sizes, such as comparing your food to the size of your hand (see pictures above).   Managing portion si Prediabetes is a disease where the body’s cells have trouble using glucose in the blood for energy. As a result, too much glucose stays in the blood and can affect how your heart and blood vessels work.  Without changes in diet and lifestyle, the problem ca The best way to treat prediabetes is to lose at least 5% to 7% of your current weight and be more physically active by getting at least 30 minutes of exercise 5 days a week. These changes help the body’s cells use blood sugar better.  Even a small amount of

## 2017-11-06 ENCOUNTER — PATIENT MESSAGE (OUTPATIENT)
Dept: INTERNAL MEDICINE CLINIC | Facility: CLINIC | Age: 52
End: 2017-11-06

## 2017-11-06 DIAGNOSIS — Z51.81 ENCOUNTER FOR THERAPEUTIC DRUG MONITORING: ICD-10-CM

## 2017-11-06 DIAGNOSIS — R73.03 PRE-DIABETES: ICD-10-CM

## 2017-11-06 DIAGNOSIS — E66.01 SEVERE OBESITY (BMI 35.0-39.9): ICD-10-CM

## 2017-11-06 NOTE — TELEPHONE ENCOUNTER
LOV: 10/16/17 [facility]  Patient has lost 1# since LOV 1 month ago on phendimetriazine, Metformin, Topamax 50 mg BID, Jardiance 25 mg. Continue current medication regimen, plan phendimetrazine for 3 months. Add Victoza as directed- samples provided.  St

## 2017-11-06 NOTE — TELEPHONE ENCOUNTER
From: Trisha Lockhart. To: NACHO Walker  Sent: 11/6/2017 2:23 PM CST  Subject: Prescription Question    Hello: I have 2 days left of the Victoza samples that were given to me. To continue will you be calling in a Rx to my pharmacy?  Or, am I s

## 2017-11-21 DIAGNOSIS — Z51.81 ENCOUNTER FOR THERAPEUTIC DRUG MONITORING: ICD-10-CM

## 2017-11-21 DIAGNOSIS — E66.01 SEVERE OBESITY (BMI 35.0-39.9): ICD-10-CM

## 2017-11-21 NOTE — TELEPHONE ENCOUNTER
Patient states provider told patient to call office when he runs out of medication if he is not able to get an appt.      Phendimetrazine Tartrate  MG Oral Capsule SR 24 Hr 30 capsule 0 10/16/2017

## 2017-11-22 RX ORDER — PHENDIMETRAZINE TARTRATE 105 MG/1
CAPSULE, EXTENDED RELEASE ORAL
Qty: 30 CAPSULE | Refills: 0 | OUTPATIENT
Start: 2017-11-22

## 2017-11-22 NOTE — TELEPHONE ENCOUNTER
Requesting Phendimetrazine Tartrate    LOV: 10/16/17  RTC: 1 mth  Last Relevant Labs: 5/22/17  Filled: 10/16/17 #30 with 0 refills    Future Appointments  Date Time Provider Alba Skinner   11/28/2017 4:00 PM Altaf Nguyen RD [facility]

## 2017-11-27 ENCOUNTER — OFFICE VISIT (OUTPATIENT)
Dept: INTERNAL MEDICINE CLINIC | Facility: CLINIC | Age: 52
End: 2017-11-27

## 2017-11-27 VITALS
WEIGHT: 259 LBS | DIASTOLIC BLOOD PRESSURE: 80 MMHG | SYSTOLIC BLOOD PRESSURE: 120 MMHG | HEIGHT: 70 IN | RESPIRATION RATE: 16 BRPM | BODY MASS INDEX: 37.08 KG/M2 | HEART RATE: 70 BPM

## 2017-11-27 DIAGNOSIS — R73.03 PRE-DIABETES: ICD-10-CM

## 2017-11-27 DIAGNOSIS — I10 ESSENTIAL HYPERTENSION: ICD-10-CM

## 2017-11-27 DIAGNOSIS — E66.01 SEVERE OBESITY (BMI 35.0-39.9): ICD-10-CM

## 2017-11-27 DIAGNOSIS — Z51.81 THERAPEUTIC DRUG MONITORING: Primary | ICD-10-CM

## 2017-11-27 PROCEDURE — 99213 OFFICE O/P EST LOW 20 MIN: CPT | Performed by: NURSE PRACTITIONER

## 2017-11-27 RX ORDER — PHENDIMETRAZINE TARTRATE 105 MG/1
1 CAPSULE, EXTENDED RELEASE ORAL
Qty: 30 CAPSULE | Refills: 0 | Status: SHIPPED | OUTPATIENT
Start: 2017-11-27 | End: 2017-12-24

## 2017-11-27 NOTE — PATIENT INSTRUCTIONS
Continue making lifestyle changes that focus on good nutrition, regular exercise and stress management. Medication Plan: Continue current medication regimen.      Agreed upon goal/s before next office visit include: Continue follow up with Shun March hunger? Think of alternatives to eating for when these temptations arise.  Some ideas are:  Drink a glass of cold water or another zero-calorie beverage   Take a walk to change the scenery   Do another form of exercise (sit-ups, running, swimming, tennis,

## 2017-11-28 ENCOUNTER — OFFICE VISIT (OUTPATIENT)
Dept: INTERNAL MEDICINE CLINIC | Facility: CLINIC | Age: 52
End: 2017-11-28

## 2017-11-28 DIAGNOSIS — E66.9 OBESITY, CLASS II, BMI 35-39.9: Primary | ICD-10-CM

## 2017-11-28 PROCEDURE — 97802 MEDICAL NUTRITION INDIV IN: CPT | Performed by: DIETITIAN, REGISTERED

## 2017-11-29 NOTE — PROGRESS NOTES
INITIAL OUTPATIENT NUTRITION CONSULTATION    Nutrition Assessment    Medical Diagnosis: Obesity    Client Age and Gender: 46year old male    Marital Status and Occupation:  with adult children.   Works FT as [employer]    Problem List as of 11/28/ Disp: 30 tablet Rfl: 2   topiramate 50 MG Oral Tab Take 1 tablet (50 mg total) by mouth 2 (two) times daily. Disp: 180 tablet Rfl: 1   AMLODIPINE BESY-BENAZEPRIL HCL 10-40 MG Oral Cap TAKE 1 CAPSULE BY MOUTH DAILY.  PLEASE SCHEDULE OFFICE VISIT FOR FURTHER ----------    HDL Cholesterol   Date Value Ref Range Status   09/09/2017 49 >45 mg/dL Final   Comment:     HDL Reference Ranges:   <26 mg/dL  Highest CHD risk   25-35 mg/dL  High CHD risk   35-45 mg/dL  Moderate CHD risk   45-60 mg/dL  Average CHD risk consultation with the patient and wife Santos Chen. Nutrition Intervention/Education:  Comprehensive nutrition education and evaluation provided for weight loss. Pt has been at [facility] since August 2014. Previously had not seen dietitian.   Pt has lost net o

## 2017-12-02 ENCOUNTER — LABORATORY ENCOUNTER (OUTPATIENT)
Dept: LAB | Age: 52
End: 2017-12-02
Attending: INTERNAL MEDICINE
Payer: COMMERCIAL

## 2017-12-02 DIAGNOSIS — D75.1 ERYTHROCYTOSIS: ICD-10-CM

## 2017-12-02 DIAGNOSIS — E78.5 DYSLIPIDEMIA: ICD-10-CM

## 2017-12-02 DIAGNOSIS — R73.01 IFG (IMPAIRED FASTING GLUCOSE): ICD-10-CM

## 2017-12-02 DIAGNOSIS — E66.8 HYPOGONADAL OBESITY: ICD-10-CM

## 2017-12-02 PROCEDURE — 84403 ASSAY OF TOTAL TESTOSTERONE: CPT | Performed by: INTERNAL MEDICINE

## 2017-12-02 PROCEDURE — 80061 LIPID PANEL: CPT | Performed by: INTERNAL MEDICINE

## 2017-12-02 PROCEDURE — 36415 COLL VENOUS BLD VENIPUNCTURE: CPT | Performed by: INTERNAL MEDICINE

## 2017-12-02 PROCEDURE — 83036 HEMOGLOBIN GLYCOSYLATED A1C: CPT | Performed by: INTERNAL MEDICINE

## 2017-12-02 PROCEDURE — 81270 JAK2 GENE: CPT | Performed by: INTERNAL MEDICINE

## 2017-12-02 PROCEDURE — 85025 COMPLETE CBC W/AUTO DIFF WBC: CPT | Performed by: INTERNAL MEDICINE

## 2017-12-02 PROCEDURE — 82728 ASSAY OF FERRITIN: CPT | Performed by: INTERNAL MEDICINE

## 2017-12-11 NOTE — PROGRESS NOTES
HPI:    Patient ID: Trisha Lockhart. is a 46year old male. HPI  Mr. Mike Galicia is a pleasant 27-year-old female with history of hypertension, depression with anxiety, prediabetes, hypogonadism here to establish his care with me.   He was on Testim prior Pen-injector Inject 1.8 mg into the skin daily.  Disp: 3 pen Rfl: 5   Phendimetrazine Tartrate  MG Oral Capsule SR 24 Hr Take 1 capsule (105 mg total) by mouth every morning before breakfast. Disp: 30 capsule Rfl: 0   Insulin Pen Needle (NOVOFINE) 32G supple. No thyromegaly present. Cardiovascular: Normal rate, regular rhythm and normal heart sounds. No murmur heard. Pulmonary/Chest: Effort normal and breath sounds normal. No respiratory distress. He has no wheezes. He has no rales.    Abdominal: S

## 2017-12-11 NOTE — PATIENT INSTRUCTIONS
Thank you for choosing Elle Jesus MD at [place] [address]  To Do: Ronak Lewis.  1. Please have labs in 3 months  2. Please take meds as directed. Effective 6/19/17 until November 2017  Due to [employer] is being moved.   It is effects or medication interactions.  It is your duty and for your safety to discuss with the pharmacist and our office with questions, and to notify us and stop treatment if problems arise, but know that our intention is that the benefits outweigh those po

## 2017-12-12 ENCOUNTER — TELEPHONE (OUTPATIENT)
Dept: FAMILY MEDICINE CLINIC | Facility: CLINIC | Age: 52
End: 2017-12-12

## 2017-12-12 NOTE — TELEPHONE ENCOUNTER
PA has been submitted through [facility] for axiron.   Waiting on denial/approval

## 2017-12-21 ENCOUNTER — OFFICE VISIT (OUTPATIENT)
Dept: INTERNAL MEDICINE CLINIC | Facility: CLINIC | Age: 52
End: 2017-12-21

## 2017-12-21 VITALS
BODY MASS INDEX: 37.08 KG/M2 | DIASTOLIC BLOOD PRESSURE: 70 MMHG | RESPIRATION RATE: 16 BRPM | SYSTOLIC BLOOD PRESSURE: 120 MMHG | HEIGHT: 70 IN | HEART RATE: 68 BPM | WEIGHT: 259 LBS

## 2017-12-21 DIAGNOSIS — R73.03 PRE-DIABETES: ICD-10-CM

## 2017-12-21 DIAGNOSIS — Z51.81 ENCOUNTER FOR THERAPEUTIC DRUG MONITORING: Primary | ICD-10-CM

## 2017-12-21 DIAGNOSIS — E66.01 SEVERE OBESITY (BMI 35.0-39.9): ICD-10-CM

## 2017-12-21 PROCEDURE — 99213 OFFICE O/P EST LOW 20 MIN: CPT | Performed by: NURSE PRACTITIONER

## 2017-12-21 RX ORDER — PHENTERMINE HYDROCHLORIDE 37.5 MG/1
37.5 TABLET ORAL
Qty: 30 TABLET | Refills: 0 | Status: SHIPPED | OUTPATIENT
Start: 2017-12-21 | End: 2018-01-17

## 2017-12-21 RX ORDER — PHENDIMETRAZINE TARTRATE 105 MG/1
1 CAPSULE, EXTENDED RELEASE ORAL
Qty: 30 CAPSULE | Refills: 0 | Status: CANCELLED | OUTPATIENT
Start: 2017-12-21

## 2017-12-21 NOTE — PROGRESS NOTES
Lorimiguelina Demar. is a 46year old male presents today for follow-up on medical weight loss program for the treatment of overweight, obesity, or morbid obesity with HTN, prediabetes, hyperlipidemia, JAVID.    S:  Current weight Wt Readings from Last 6 Enc intact  PSYCH: pleasant, cooperative, normal mood and affect    ASSESSMENT AND PLAN:  Encounter for therapeutic drug monitoring  (primary encounter diagnosis)  Severe obesity (bmi 35.0-39.9) (hcc)  Pre-diabetes    No orders of the defined types were placed and Activity    Studies show that people who exercise are the most likely to lose weight and keep it off. Exercise burns calories. It helps build muscle to make your body stronger. Make exercise an important part of your weight-management plan.   Make activ professional medical care. Always follow your healthcare professional's instructions. Exercise Prescription for Roula Garcia.          Maximum Heart Rate = 220 – Age     Your Max heart rate is: 168  Target 60 to 90% Max   Your target heart range moderate speed, try doing a fast minute followed by 4 recovery minutes. Each week, either push the hard interval, or shorten the recovery by 30 seconds. Your target ratio is 1 minute fast/hard to 1-2 minutes relative recovery.     You can push yourself fur

## 2017-12-21 NOTE — PATIENT INSTRUCTIONS
Continue making lifestyle changes that focus on good nutrition, regular exercise and stress management.     Medication Plan: Continue current medication regimen aside from stop phendimetrazine and start phentermine daily in AM.    Agreed upon goal/s before sport  · Take a dance class  · Walk the dog  · Ride a bike  If you have health problems, be sure to ask your healthcare provider before you start an exercise program. Have a  help you develop a plan that’s safe for you.    Date Last Revi distances. Interval training:    While simple walking or running on a treadmill is good, pushing yourself harder is better.  Intervals of fast and hard activity that target an end heart rate at 85% max are attempted, followed by a relative recovery inter

## 2018-01-03 ENCOUNTER — TELEPHONE (OUTPATIENT)
Dept: FAMILY MEDICINE CLINIC | Facility: CLINIC | Age: 53
End: 2018-01-03

## 2018-01-03 RX ORDER — SILDENAFIL CITRATE 20 MG/1
20 TABLET ORAL DAILY
Qty: 30 TABLET | Refills: 5 | Status: SHIPPED | OUTPATIENT
Start: 2018-01-03 | End: 2018-05-31

## 2018-01-03 NOTE — TELEPHONE ENCOUNTER
Received incoming fax from pharmacy requesting refill    Sildenafil Citrate 20 MG Oral Tab  Take 1 tablet (20 mg total) by mouth daily.  erections    [facility] [address] [facility] [place], [address] [facility], [phone]

## 2018-01-03 NOTE — TELEPHONE ENCOUNTER
Requesting Cialis 20 mg  LOV: 12/11/17  RTC: 3 months  Last Relevant Labs: n/a  Filled: 9/12/17 #30 with 3 refills    Future Appointments  3/13/2018 3:30 PM Neri Hernandez MD [facility] [address] [facility] [address]   3/21/2018 3:40 PM NACHO Jeronimo EMG[facility]I [facility] 75

## 2018-01-12 ENCOUNTER — PATIENT MESSAGE (OUTPATIENT)
Dept: FAMILY MEDICINE CLINIC | Facility: CLINIC | Age: 53
End: 2018-01-12

## 2018-01-12 NOTE — TELEPHONE ENCOUNTER
From: Zoe Dominique. To: Sahhana Whyte MD  Sent: 1/12/2018 9:52 AM CST  Subject: Other    Edita Hillman, Dr. Jennifer Ritter:     I received a letter asking why I had not yet followed up?  On my last visit on 12-11-17 I was told to have my labs done and make an

## 2018-01-17 ENCOUNTER — OFFICE VISIT (OUTPATIENT)
Dept: INTERNAL MEDICINE CLINIC | Facility: CLINIC | Age: 53
End: 2018-01-17

## 2018-01-17 VITALS
HEART RATE: 68 BPM | BODY MASS INDEX: 36.36 KG/M2 | RESPIRATION RATE: 16 BRPM | SYSTOLIC BLOOD PRESSURE: 110 MMHG | WEIGHT: 254 LBS | HEIGHT: 70 IN | DIASTOLIC BLOOD PRESSURE: 70 MMHG

## 2018-01-17 DIAGNOSIS — Z51.81 ENCOUNTER FOR THERAPEUTIC DRUG MONITORING: Primary | ICD-10-CM

## 2018-01-17 DIAGNOSIS — E66.01 SEVERE OBESITY (BMI 35.0-39.9): ICD-10-CM

## 2018-01-17 PROCEDURE — 99213 OFFICE O/P EST LOW 20 MIN: CPT | Performed by: NURSE PRACTITIONER

## 2018-01-17 RX ORDER — FEBUXOSTAT 40 MG/1
1 TABLET, FILM COATED ORAL DAILY
COMMUNITY
End: 2018-01-17

## 2018-01-17 RX ORDER — PHENTERMINE HYDROCHLORIDE 37.5 MG/1
37.5 TABLET ORAL
Qty: 30 TABLET | Refills: 0 | Status: SHIPPED | OUTPATIENT
Start: 2018-01-17 | End: 2018-02-22

## 2018-01-17 RX ORDER — AMLODIPINE BESYLATE AND BENAZEPRIL HYDROCHLORIDE 10; 40 MG/1; MG/1
1 CAPSULE ORAL DAILY
COMMUNITY
End: 2018-01-17

## 2018-01-17 RX ORDER — TESTOSTERONE 30 MG/1.5ML
SOLUTION TOPICAL
COMMUNITY
End: 2018-01-17

## 2018-01-17 RX ORDER — MULTIVITAMIN
1 TABLET ORAL DAILY
COMMUNITY
End: 2018-03-28

## 2018-01-17 NOTE — PROGRESS NOTES
Christopher Kruse. is a 46year old male presents today for follow-up on medical weight loss program for the treatment of overweight, obesity, or morbid obesity with HTN, prediabetes, hyperlipidemia, JAVID.    S:  Current weight Wt Readings from Last 6 Enc NEURO/MS: motor and sensory grossly intact  PSYCH: pleasant, cooperative, normal mood and affect    ASSESSMENT AND PLAN:  Encounter for therapeutic drug monitoring  (primary encounter diagnosis)  Severe obesity (bmi 35.0-39.9) (hcc)    No orders of the def Well, look no further! With the fat vs muscle diagram below you’ll see why healthy permanent weight loss requires you to build muscle to lose fat. Fat vs Muscle Diagram  The facts are clear. The best way to lose fat and look slimmer is to build muscle.  Si 5. Increase bone density. Weight bearing activities improve your bone density and reduce bone loss. This helps to prevent osteoporosis.   Studies show that weight training combined with aerobics and stretching is the best way to build a strong, firm body an

## 2018-01-17 NOTE — PATIENT INSTRUCTIONS
Congratulations on 5# weight loss! Continue making lifestyle changes that focus on good nutrition, regular exercise and stress management. Medication Plan: Continue current medication regimen.     Agreed upon goal/s before next office visit include: Everett Cruz you:  1. Burn more calories. Unlike fat, muscle beefs up your metabolism to help you burn about 70% more calories than fat can. 2. Improve appearance. When done properly, strength training can greatly improve your posture and help to prevent joint pain.

## 2018-01-23 NOTE — TELEPHONE ENCOUNTER
From: Linda Garcia. Sent: 1/23/2018 5:22 PM CST  Subject: Medication Renewal Request    Ori Castro. would like a refill of the following medications:     ALPRAZolam 0.25 MG Oral Tab Grant Mota MD]    Preferred pharmacy: [facility] [id number] [place]

## 2018-01-24 RX ORDER — ALPRAZOLAM 0.25 MG/1
0.25 TABLET ORAL 2 TIMES DAILY PRN
Qty: 60 TABLET | Refills: 1
Start: 2018-01-24 | End: 2018-03-26

## 2018-01-24 NOTE — TELEPHONE ENCOUNTER
Requesting Alprazolam 0.25 mg  LOV:  12/11/17  RTC: 3 mos   Last Labs: n/a  Filled: 12/11/17 #30with 1 refills    Future Appointments  Date Time Provider Alba Skinner   2/21/2018 3:20 PM NACHO Harrison [facility] [address]   3/13/2018 3:30 PM To

## 2018-01-24 NOTE — TELEPHONE ENCOUNTER
Spoke to [employer] pharmacist at [facility], called in prescription for alprazolam 0.25mg, pharmacists verbalized understanding with intent to inform pt when ready to be picked up.       Disp Refills Start End    ALPRAZolam 0.25 MG Oral Tab 60 tablet 1 1/24/20

## 2018-01-29 ENCOUNTER — PATIENT MESSAGE (OUTPATIENT)
Dept: FAMILY MEDICINE CLINIC | Facility: CLINIC | Age: 53
End: 2018-01-29

## 2018-01-29 NOTE — TELEPHONE ENCOUNTER
From: Ronak Lewis. To: Drea Ribera MD  Sent: 1/29/2018 12:16 PM CST  Subject: Non-Urgent Medical Question    Hello:    I am needing to schedule my colonoscopy.  However, I have been having some indigestion problems and would like to see a joao

## 2018-02-05 ENCOUNTER — HOSPITAL ENCOUNTER (EMERGENCY)
Age: 53
Discharge: HOME | End: 2018-02-05
Attending: EMERGENCY MEDICINE
Payer: COMMERCIAL

## 2018-02-05 VITALS
HEIGHT: 73 IN | TEMPERATURE: 98 F | SYSTOLIC BLOOD PRESSURE: 135 MMHG | WEIGHT: 255 LBS | HEART RATE: 79 BPM | RESPIRATION RATE: 16 BRPM | BODY MASS INDEX: 33.8 KG/M2 | OXYGEN SATURATION: 97 % | DIASTOLIC BLOOD PRESSURE: 85 MMHG

## 2018-02-05 DIAGNOSIS — S43.401A SPRAIN OF RIGHT SHOULDER, UNSPECIFIED SHOULDER SPRAIN TYPE, INITIAL ENCOUNTER: Primary | ICD-10-CM

## 2018-02-05 PROCEDURE — 99283 EMERGENCY DEPT VISIT LOW MDM: CPT

## 2018-02-05 RX ORDER — ACETAMINOPHEN 500 MG
500 TABLET ORAL ONCE
Status: COMPLETED | OUTPATIENT
Start: 2018-02-05 | End: 2018-02-05

## 2018-02-05 RX ORDER — IBUPROFEN 200 MG
200 TABLET ORAL ONCE
Status: COMPLETED | OUTPATIENT
Start: 2018-02-05 | End: 2018-02-05

## 2018-02-05 NOTE — ED INITIAL ASSESSMENT (HPI)
Strained r shoulder on Saturday while holding himself up in crawl space with r arm while using legs to stabilize ladder- pain to r shoulder remains

## 2018-02-05 NOTE — ED PROVIDER NOTES
Patient Seen in: THE [facility] Emergency Department In [place]    History   Patient presents with:  Upper Extremity Injury (musculoskeletal)    Stated Complaint: RIGHT SHOULDER INJURY ON SAT    HPI    Patient is a 27-year-old male comes emergency room for ev Packs/day: 0.00      Years: 0.00         Quit date: 1/7/2011  Smokeless tobacco: Never Used                      Alcohol use: Yes           0.0 oz/week     Comment: couple drinks per month      Review of Systems    Positive for stated complaint: RIGHT SHOU an emergency medical condition was not or was no longer present. There was no indication for further evaluation, treatment or admission on an emergency basis.   Comprehensive verbal and written discharge and follow-up instructions were provided to help pre

## 2018-02-06 ENCOUNTER — TELEPHONE (OUTPATIENT)
Dept: FAMILY MEDICINE CLINIC | Facility: CLINIC | Age: 53
End: 2018-02-06

## 2018-02-22 ENCOUNTER — PATIENT MESSAGE (OUTPATIENT)
Dept: INTERNAL MEDICINE CLINIC | Facility: CLINIC | Age: 53
End: 2018-02-22

## 2018-02-22 ENCOUNTER — PATIENT MESSAGE (OUTPATIENT)
Dept: FAMILY MEDICINE CLINIC | Facility: CLINIC | Age: 53
End: 2018-02-22

## 2018-02-22 DIAGNOSIS — E66.01 SEVERE OBESITY (BMI 35.0-39.9): ICD-10-CM

## 2018-02-22 DIAGNOSIS — Z51.81 ENCOUNTER FOR THERAPEUTIC DRUG MONITORING: ICD-10-CM

## 2018-02-22 RX ORDER — ALPRAZOLAM 0.25 MG/1
0.25 TABLET ORAL 2 TIMES DAILY PRN
Qty: 60 TABLET | Refills: 1 | Status: CANCELLED
Start: 2018-02-22

## 2018-02-22 RX ORDER — LOSARTAN POTASSIUM 100 MG/1
100 TABLET ORAL
Qty: 90 TABLET | Refills: 0 | Status: SHIPPED | OUTPATIENT
Start: 2018-02-22 | End: 2018-05-21

## 2018-02-22 NOTE — TELEPHONE ENCOUNTER
Requesting phentermine  LOV: 1/17/18   RTC: 4weeks   Filled: 1/17/18 #30 with 0 refills    According to iP website last refill 1/17/18    Future Appointments  Date Time Provider Alba Skinner   3/1/2018 3:20 PM Carroll Causey MD [facility]

## 2018-02-22 NOTE — TELEPHONE ENCOUNTER
Requesting Losartan  LOV: 12/11/17  RTC: 3 months   Last Relevant Labs: pp  Filled: 8/18/17 #90 with 1 refills    Future Appointments  Date Time Provider Alba Choi   3/1/2018 3:20 PM Spencer Brumfield MD [facility]   3/13/2018 3:30 PM Velia

## 2018-02-22 NOTE — TELEPHONE ENCOUNTER
Requesting Alprazolam   LOV: 12/11/17  RTC: 3 mos   Last Labs: n/a  Filled: 01/24/18 #60 with 1 refills-not due for a refill, advised pt that he may not be due for a refill until 2/24/18m, advised pt to check with his pharmacy for further clarification  Fu

## 2018-02-22 NOTE — TELEPHONE ENCOUNTER
Received refill request from [facility] in [facility] for Losartan 100 mg.

## 2018-02-22 NOTE — TELEPHONE ENCOUNTER
Spoke to [facility] pharmacy and they did not have the refill from the script written on 01/24/18.  Pharmacy will add 1 refill to the script written on 1/24/18, since they only had Alprazolam #60 with 0 refills

## 2018-02-22 NOTE — TELEPHONE ENCOUNTER
From: Ahsan Sommer. To: Ricardo Oh MD  Sent: 2/22/2018 3:24 PM CST  Subject: Prescription Question    Why was the request for Alprazolam not approved?

## 2018-02-23 RX ORDER — PHENTERMINE HYDROCHLORIDE 37.5 MG/1
37.5 TABLET ORAL
Qty: 30 TABLET | Refills: 0 | OUTPATIENT
Start: 2018-02-23 | End: 2018-03-28

## 2018-02-23 NOTE — TELEPHONE ENCOUNTER
From: Meme Beltran. To: NACHO Badillo  Sent: 2/22/2018 4:37 PM CST  Subject: Prescription Question    My appointment scheduled for 2/21/18 was cancelled on your end. The only appointments you had available were during my work hours.  I was t

## 2018-02-23 NOTE — TELEPHONE ENCOUNTER
Patient had an appt scheduled with Lucia Garcia for 2/21/18 but clinic cancelled as provider was out of the office. Phentermine pended for phone in refill.

## 2018-03-10 ENCOUNTER — LAB ENCOUNTER (OUTPATIENT)
Dept: LAB | Age: 53
End: 2018-03-10
Attending: FAMILY MEDICINE
Payer: COMMERCIAL

## 2018-03-10 DIAGNOSIS — E29.1 HYPOGONADISM MALE: ICD-10-CM

## 2018-03-10 LAB
ALBUMIN SERPL-MCNC: 4.1 G/DL (ref 3.5–4.8)
ALP LIVER SERPL-CCNC: 58 U/L (ref 45–117)
ALT SERPL-CCNC: 46 U/L (ref 17–63)
AST SERPL-CCNC: 32 U/L (ref 15–41)
BASOPHILS # BLD AUTO: 0.07 X10(3) UL (ref 0–0.1)
BASOPHILS NFR BLD AUTO: 0.9 %
BILIRUB SERPL-MCNC: 0.6 MG/DL (ref 0.1–2)
BUN BLD-MCNC: 15 MG/DL (ref 8–20)
CALCIUM BLD-MCNC: 9 MG/DL (ref 8.3–10.3)
CHLORIDE: 104 MMOL/L (ref 101–111)
CO2: 25 MMOL/L (ref 22–32)
CREAT BLD-MCNC: 0.92 MG/DL (ref 0.7–1.3)
EOSINOPHIL # BLD AUTO: 0.21 X10(3) UL (ref 0–0.3)
EOSINOPHIL NFR BLD AUTO: 2.8 %
ERYTHROCYTE [DISTWIDTH] IN BLOOD BY AUTOMATED COUNT: 12.5 % (ref 11.5–16)
GLUCOSE BLD-MCNC: 98 MG/DL (ref 70–99)
HCT VFR BLD AUTO: 52.3 % (ref 37–53)
HGB BLD-MCNC: 17.1 G/DL (ref 13–17)
IMMATURE GRANULOCYTE COUNT: 0.04 X10(3) UL (ref 0–1)
IMMATURE GRANULOCYTE RATIO %: 0.5 %
LYMPHOCYTES # BLD AUTO: 2.39 X10(3) UL (ref 0.9–4)
LYMPHOCYTES NFR BLD AUTO: 32.3 %
M PROTEIN MFR SERPL ELPH: 8.1 G/DL (ref 6.1–8.3)
MCH RBC QN AUTO: 31.6 PG (ref 27–33.2)
MCHC RBC AUTO-ENTMCNC: 32.7 G/DL (ref 31–37)
MCV RBC AUTO: 96.7 FL (ref 80–99)
MONOCYTES # BLD AUTO: 0.69 X10(3) UL (ref 0.1–1)
MONOCYTES NFR BLD AUTO: 9.3 %
NEUTROPHIL ABS PRELIM: 4.01 X10 (3) UL (ref 1.3–6.7)
NEUTROPHILS # BLD AUTO: 4.01 X10(3) UL (ref 1.3–6.7)
NEUTROPHILS NFR BLD AUTO: 54.2 %
PLATELET # BLD AUTO: 185 10(3)UL (ref 150–450)
POTASSIUM SERPL-SCNC: 4 MMOL/L (ref 3.6–5.1)
RBC # BLD AUTO: 5.41 X10(6)UL (ref 4.3–5.7)
RED CELL DISTRIBUTION WIDTH-SD: 44.2 FL (ref 35.1–46.3)
SODIUM SERPL-SCNC: 137 MMOL/L (ref 136–144)
WBC # BLD AUTO: 7.4 X10(3) UL (ref 4–13)

## 2018-03-10 PROCEDURE — 85025 COMPLETE CBC W/AUTO DIFF WBC: CPT | Performed by: FAMILY MEDICINE

## 2018-03-10 PROCEDURE — 84402 ASSAY OF FREE TESTOSTERONE: CPT | Performed by: FAMILY MEDICINE

## 2018-03-10 PROCEDURE — 36415 COLL VENOUS BLD VENIPUNCTURE: CPT | Performed by: FAMILY MEDICINE

## 2018-03-10 PROCEDURE — 80053 COMPREHEN METABOLIC PANEL: CPT | Performed by: FAMILY MEDICINE

## 2018-03-10 PROCEDURE — 84403 ASSAY OF TOTAL TESTOSTERONE: CPT | Performed by: FAMILY MEDICINE

## 2018-03-18 LAB
TESTOSTERONE TOTAL: 421 NG/DL
TESTOSTERONE, FREE -MS/MS: 91.8 PG/ML

## 2018-03-23 ENCOUNTER — PATIENT MESSAGE (OUTPATIENT)
Dept: FAMILY MEDICINE CLINIC | Facility: CLINIC | Age: 53
End: 2018-03-23

## 2018-03-26 ENCOUNTER — TELEPHONE (OUTPATIENT)
Dept: FAMILY MEDICINE CLINIC | Facility: CLINIC | Age: 53
End: 2018-03-26

## 2018-03-26 RX ORDER — ALPRAZOLAM 0.25 MG/1
0.25 TABLET ORAL 2 TIMES DAILY PRN
Qty: 60 TABLET | Refills: 0 | Status: SHIPPED
Start: 2018-03-26 | End: 2018-05-31

## 2018-03-26 NOTE — TELEPHONE ENCOUNTER
Pt picked up medications. Pt claims that he is using Xanax is during the day and the Clonazapam at night, not at the same time.

## 2018-03-26 NOTE — TELEPHONE ENCOUNTER
ALPRAZolam 0.25 MG Oral Tab 60 tablet 0 3/26/2018    Sig :  Take 1 tablet (0.25 mg total) by mouth 2 (two) times daily as needed for Anxiety.      Route:   Oral       Pharmacy would like to speak with a nurse regarding     ClonazePAM 0.5 MG Oral Tab 30 tabl

## 2018-03-26 NOTE — TELEPHONE ENCOUNTER
Pt needs appt to assess appropriateness of taking 2 benzos.  I will approve for this one time, but needs to address with pcp

## 2018-03-26 NOTE — TELEPHONE ENCOUNTER
Requesting Alprazolam   LOV: 12/11/17  RTC: 3 months   Last Relevant Labs: n/a   Filled: 1/24/18 #60 with 1 refills    Future Appointments  Date Time Provider Alba Skinner   3/28/2018 3:40 PM NACHO Atkinson [facility]   4/3/2018 4:00 P

## 2018-03-26 NOTE — TELEPHONE ENCOUNTER
From: Dandre Wood. To: Tegan Hernandez MD  Sent: 3/23/2018 4:08 PM CDT  Subject: Prescription Question    Once again I'm being told from my pharmacy that a request for a refill of Xanax has been denied. Once again, I'm sure it's a mistake.  Could

## 2018-03-26 NOTE — TELEPHONE ENCOUNTER
Pharmacy would like to know if pt should be on both Xanax and clonazepam together.  Please advise     LOV: 12/11/17 Dr Buffy Alicea  ASSESSMENT/PLAN:   Depression, major, recurrent, mild (hcc)  (primary encounter diagnosis)  Hypogonadism male  Anxiety  [place]

## 2018-03-27 ENCOUNTER — TELEPHONE (OUTPATIENT)
Dept: FAMILY MEDICINE CLINIC | Facility: CLINIC | Age: 53
End: 2018-03-27

## 2018-03-28 ENCOUNTER — OFFICE VISIT (OUTPATIENT)
Dept: INTERNAL MEDICINE CLINIC | Facility: CLINIC | Age: 53
End: 2018-03-28

## 2018-03-28 VITALS
RESPIRATION RATE: 16 BRPM | BODY MASS INDEX: 36.65 KG/M2 | WEIGHT: 256 LBS | HEART RATE: 68 BPM | DIASTOLIC BLOOD PRESSURE: 60 MMHG | HEIGHT: 70 IN | SYSTOLIC BLOOD PRESSURE: 110 MMHG

## 2018-03-28 DIAGNOSIS — Z51.81 THERAPEUTIC DRUG MONITORING: ICD-10-CM

## 2018-03-28 DIAGNOSIS — R73.03 PRE-DIABETES: ICD-10-CM

## 2018-03-28 DIAGNOSIS — Z51.81 ENCOUNTER FOR THERAPEUTIC DRUG MONITORING: Primary | ICD-10-CM

## 2018-03-28 DIAGNOSIS — E66.01 SEVERE OBESITY (BMI 35.0-39.9): ICD-10-CM

## 2018-03-28 PROCEDURE — 99213 OFFICE O/P EST LOW 20 MIN: CPT | Performed by: NURSE PRACTITIONER

## 2018-03-28 RX ORDER — TOPIRAMATE 100 MG/1
100 TABLET, FILM COATED ORAL 2 TIMES DAILY
Qty: 180 TABLET | Refills: 5 | Status: SHIPPED | OUTPATIENT
Start: 2018-03-28 | End: 2018-06-04

## 2018-03-28 RX ORDER — SULFAMETHOXAZOLE AND TRIMETHOPRIM 800; 160 MG/1; MG/1
TABLET ORAL
COMMUNITY
Start: 2018-03-25 | End: 2018-04-11 | Stop reason: ALTCHOICE

## 2018-03-28 RX ORDER — PHENTERMINE HYDROCHLORIDE 37.5 MG/1
37.5 TABLET ORAL
Qty: 30 TABLET | Refills: 0 | Status: SHIPPED | OUTPATIENT
Start: 2018-03-28 | End: 2018-04-23

## 2018-03-28 RX ORDER — METFORMIN HYDROCHLORIDE 750 MG/1
2250 TABLET, EXTENDED RELEASE ORAL
Qty: 270 TABLET | Refills: 1 | Status: SHIPPED | OUTPATIENT
Start: 2018-03-28 | End: 2018-04-23

## 2018-03-28 NOTE — PROGRESS NOTES
Ronak Debbie. is a 46year old male presents today for follow-up on medical weight loss program for the treatment of overweight, obesity, or morbid obesity with HTN, prediabetes, hyperlipidemia, JAVID.    S:  Current weight Wt Readings from Last 6 Enc intact  PSYCH: pleasant, cooperative, normal mood and affect    ASSESSMENT AND PLAN:  Encounter for therapeutic drug monitoring  (primary encounter diagnosis)  Severe obesity (bmi 35.0-39.9) (hcc)  Pre-diabetes  Therapeutic drug monitoring    No orders of mg twice a day. Agreed upon goal/s before next office visit include: Resume exercise when shoulder better. Weight Management: Take It Off and Keep It Off  It’s easy to be motivated when you first start.  The key is to stay motivated all along the way things that others like about you and that you like about yourself. Add something new from time to time. Keep this list to look at when you need a lift. Resources  · The [employer] on Fitness, Sports & Nutritionwww. fitness. gov  · [employer]

## 2018-03-28 NOTE — PATIENT INSTRUCTIONS
Continue making lifestyle changes that focus on good nutrition, regular exercise and stress management. Medication Plan: Continue current medication regimen aside from increase Topamax to 100 mg twice a day.     Agreed upon goal/s before next office visi to understand your own attitude about food.  Are you subject to emotional eating? · Learn how to accept compliments.  Even if you get embarrassed, just say “thank you.”  · Make a list of the things that others like about you and that you like about yoursel

## 2018-04-11 ENCOUNTER — OFFICE VISIT (OUTPATIENT)
Dept: FAMILY MEDICINE CLINIC | Facility: CLINIC | Age: 53
End: 2018-04-11

## 2018-04-11 VITALS
HEIGHT: 71.5 IN | TEMPERATURE: 98 F | WEIGHT: 259 LBS | HEART RATE: 80 BPM | RESPIRATION RATE: 16 BRPM | SYSTOLIC BLOOD PRESSURE: 124 MMHG | BODY MASS INDEX: 35.47 KG/M2 | DIASTOLIC BLOOD PRESSURE: 80 MMHG

## 2018-04-11 DIAGNOSIS — E78.00 HIGH CHOLESTEROL: ICD-10-CM

## 2018-04-11 DIAGNOSIS — I10 ESSENTIAL HYPERTENSION: Primary | ICD-10-CM

## 2018-04-11 DIAGNOSIS — E29.1 HYPOGONADISM MALE: ICD-10-CM

## 2018-04-11 PROCEDURE — 99214 OFFICE O/P EST MOD 30 MIN: CPT | Performed by: FAMILY MEDICINE

## 2018-04-11 RX ORDER — CLONAZEPAM 0.5 MG/1
0.5 TABLET ORAL NIGHTLY PRN
Qty: 30 TABLET | Refills: 3 | Status: SHIPPED | OUTPATIENT
Start: 2018-04-11 | End: 2018-05-31

## 2018-04-11 RX ORDER — CLONAZEPAM 0.5 MG/1
0.5 TABLET ORAL NIGHTLY PRN
Qty: 30 TABLET | Refills: 3 | Status: CANCELLED | OUTPATIENT
Start: 2018-04-11

## 2018-04-11 RX ORDER — ALPRAZOLAM 0.5 MG/1
0.5 TABLET ORAL NIGHTLY PRN
Qty: 60 TABLET | Refills: 1 | Status: SHIPPED | OUTPATIENT
Start: 2018-04-11 | End: 2018-06-26

## 2018-04-11 NOTE — PROGRESS NOTES
HPI:    Patient ID: Khalida Mondragon. is a 46year old male. HPI  Mr. Avni Belle is a pleasant 14-year-old male with history of hypertension, hyperlipidemia, hypogonadism who is here for his follow-up visit.   He has been fairly doing well as been complia once daily with insulin pen. Disp: 50 Box Rfl: 0   ALPRAZolam 0.25 MG Oral Tab Take 1 tablet (0.25 mg total) by mouth 2 (two) times daily as needed for Anxiety.  Disp: 60 tablet Rfl: 0   Omeprazole 40 MG Oral Capsule Delayed Release Take 1 capsule (40 mg to wheezes. He has no rales. Abdominal: Soft. Bowel sounds are normal.   Musculoskeletal: He exhibits no edema. Lymphadenopathy:     He has no cervical adenopathy. Neurological: He is alert. Psychiatric: He has a normal mood and affect.  His behavior i

## 2018-04-11 NOTE — PATIENT INSTRUCTIONS
Thank you for choosing Deonna Serrano MD at [place] [address]  To Do: Darlyn Mata.  1. Please take meds as directed  [facility] is located in [address]. Monday, Tuesday & Friday – 8 a.m. to 4 p.m.   Wednesday, Thursday – 7 a.m. to 3 p outweigh those potential risks and we strive to make you healthier and to improve your quality of life.     Referrals, and Radiology Information:    If your insurance requires a referral to a specialist, please allow 5 business days to process your referral

## 2018-04-11 NOTE — TELEPHONE ENCOUNTER
Requesting Clonazepam  LOV: 12/11/17  RTC: 3 months  Last Relevant Labs: n/a  Filled: 12/11/17 #30 with 3 refills    Future Appointments  Date Time Provider Alba Skinner   4/11/2018 3:00 PM Chivo Cruz MD [facility] [address] [facility] [address]   4/23/2018 3:20 PM W

## 2018-04-16 DIAGNOSIS — E66.01 SEVERE OBESITY (BMI 35.0-39.9): ICD-10-CM

## 2018-04-16 DIAGNOSIS — Z51.81 ENCOUNTER FOR THERAPEUTIC DRUG MONITORING: ICD-10-CM

## 2018-04-16 DIAGNOSIS — R73.03 PRE-DIABETES: ICD-10-CM

## 2018-04-16 RX ORDER — EMPAGLIFLOZIN 25 MG/1
1 TABLET, FILM COATED ORAL DAILY
Qty: 30 TABLET | Refills: 1 | OUTPATIENT
Start: 2018-04-16

## 2018-04-16 NOTE — TELEPHONE ENCOUNTER
Requesting che 25  LOV: 3/28/18  RTC: 1 mth  Last Relevant Labs:   Filled: 12/21/17 #30 with 5 refills    Future Appointments  Date Time Provider Alba Skinner   4/23/2018 3:20 PM NACHO Pedroza [address]   5/30/2018 3:40 PM Mac

## 2018-04-23 ENCOUNTER — OFFICE VISIT (OUTPATIENT)
Dept: INTERNAL MEDICINE CLINIC | Facility: CLINIC | Age: 53
End: 2018-04-23

## 2018-04-23 VITALS
HEIGHT: 70 IN | RESPIRATION RATE: 16 BRPM | SYSTOLIC BLOOD PRESSURE: 118 MMHG | WEIGHT: 258 LBS | BODY MASS INDEX: 36.94 KG/M2 | DIASTOLIC BLOOD PRESSURE: 60 MMHG | HEART RATE: 80 BPM

## 2018-04-23 DIAGNOSIS — R73.03 PRE-DIABETES: ICD-10-CM

## 2018-04-23 DIAGNOSIS — I10 ESSENTIAL HYPERTENSION: ICD-10-CM

## 2018-04-23 DIAGNOSIS — E66.01 SEVERE OBESITY (BMI 35.0-39.9): ICD-10-CM

## 2018-04-23 DIAGNOSIS — Z51.81 ENCOUNTER FOR THERAPEUTIC DRUG MONITORING: Primary | ICD-10-CM

## 2018-04-23 PROCEDURE — 99214 OFFICE O/P EST MOD 30 MIN: CPT | Performed by: NURSE PRACTITIONER

## 2018-04-23 RX ORDER — PHENTERMINE HYDROCHLORIDE 37.5 MG/1
37.5 TABLET ORAL
Qty: 30 TABLET | Refills: 0 | Status: SHIPPED | OUTPATIENT
Start: 2018-04-23 | End: 2018-06-04

## 2018-04-23 RX ORDER — METFORMIN HYDROCHLORIDE 750 MG/1
1500 TABLET, EXTENDED RELEASE ORAL
Qty: 180 TABLET | Refills: 1 | COMMUNITY
Start: 2018-04-23 | End: 2018-06-04

## 2018-04-23 NOTE — PATIENT INSTRUCTIONS
Continue making lifestyle changes that focus on good nutrition, regular exercise and stress management.     Medication Plan: Continue current medication regimen aside from reduce metformin ER to 2 tabs (1500 mg) daily, stop Victoza and start Saxenda as inst your dog along! Q&A about fitness walking  Q: Will walking keep me fit? A: Yes. Regular walking at the right pace gives you all the benefits of other aerobic activities, such as jogging and swimming. Q: Will walking help me lose weight and keep it off? improve or prevent health problems. This surgery is not done for cosmetic reasons. Keep in mind that:  · Other weight-loss methods should be tried first. Lifestyle changes, behavioral modifications, and prescription medicines are initial choices.  Surgery i

## 2018-04-23 NOTE — PROGRESS NOTES
Usman Lopez. is a 46year old male presents today for follow-up on medical weight loss program for the treatment of overweight, obesity, or morbid obesity with HTN, prediabetes, hyperlipidemia, JAVID.    S:  Current weight Wt Readings from Last 6 Enc CARDIO: RRR without murmur, normal S1 and S2 without clicks or gallops, no pedal edema.   GI: +BS, soft  NEURO/MS: motor and sensory grossly intact  PSYCH: pleasant, cooperative, normal mood and affect    ASSESSMENT AND PLAN:  Encounter for therapeutic drug Continue making lifestyle changes that focus on good nutrition, regular exercise and stress management.     Medication Plan: Continue current medication regimen aside from reduce metformin ER to 2 tabs (1500 mg) daily, stop Victoza and start Saxenda as inst · Walking is a great way to spend extra time with friends and family members. Be sure to invite your dog along! Q&A about fitness walking  Q: Will walking keep me fit? A: Yes.  Regular walking at the right pace gives you all the benefits of other aerobic The goal of bariatric surgery is to help you lose over half of your excess weight. This can improve or prevent health problems. This surgery is not done for cosmetic reasons.  Keep in mind that:  · Other weight-loss methods should be tried first. Lifestyle

## 2018-04-24 ENCOUNTER — TELEPHONE (OUTPATIENT)
Dept: INTERNAL MEDICINE CLINIC | Facility: CLINIC | Age: 53
End: 2018-04-24

## 2018-04-24 NOTE — TELEPHONE ENCOUNTER
On phentermine 37.5 mg daily (4 month attack), Metformin ER 2250 mg daily, Topamax 100 mg BID, Jardiance 25 mg daily, Victoza 1.8 mg daily.     Continue current medication regimen aside from discontinue Victoza and switch to [address] [place] [address] starting at 1.8 mg daily

## 2018-04-25 ENCOUNTER — OFFICE VISIT (OUTPATIENT)
Dept: FAMILY MEDICINE CLINIC | Facility: CLINIC | Age: 53
End: 2018-04-25

## 2018-04-25 VITALS
HEART RATE: 66 BPM | WEIGHT: 258 LBS | DIASTOLIC BLOOD PRESSURE: 88 MMHG | RESPIRATION RATE: 20 BRPM | TEMPERATURE: 98 F | BODY MASS INDEX: 36.94 KG/M2 | SYSTOLIC BLOOD PRESSURE: 134 MMHG | OXYGEN SATURATION: 99 % | HEIGHT: 70 IN

## 2018-04-25 DIAGNOSIS — R31.9 HEMATURIA, UNSPECIFIED TYPE: ICD-10-CM

## 2018-04-25 DIAGNOSIS — R39.9 UTI SYMPTOMS: Primary | ICD-10-CM

## 2018-04-25 DIAGNOSIS — R81 GLUCOSE FOUND IN URINE ON EXAMINATION: ICD-10-CM

## 2018-04-25 PROCEDURE — 87086 URINE CULTURE/COLONY COUNT: CPT | Performed by: NURSE PRACTITIONER

## 2018-04-25 PROCEDURE — 87077 CULTURE AEROBIC IDENTIFY: CPT | Performed by: NURSE PRACTITIONER

## 2018-04-25 PROCEDURE — 81003 URINALYSIS AUTO W/O SCOPE: CPT | Performed by: NURSE PRACTITIONER

## 2018-04-25 PROCEDURE — 99213 OFFICE O/P EST LOW 20 MIN: CPT | Performed by: NURSE PRACTITIONER

## 2018-04-25 PROCEDURE — 87186 SC STD MICRODIL/AGAR DIL: CPT | Performed by: NURSE PRACTITIONER

## 2018-04-25 PROCEDURE — 82962 GLUCOSE BLOOD TEST: CPT | Performed by: NURSE PRACTITIONER

## 2018-04-25 RX ORDER — CIPROFLOXACIN 250 MG/1
500 TABLET, FILM COATED ORAL 2 TIMES DAILY
Qty: 28 TABLET | Refills: 0 | Status: SHIPPED | OUTPATIENT
Start: 2018-04-25 | End: 2018-05-09

## 2018-04-25 NOTE — PROGRESS NOTES
CHIEF COMPLAINT:   Patient presents with:  Burning On Urination: difficult to urinate s/s for 1 day       HPI:   Kenneth Quezada. is a 46year old male who presents with symptoms of UTI.  Complaining of urinary frequency, urgency, dysuria and difficul Omeprazole 40 MG Oral Capsule Delayed Release Take 1 capsule (40 mg total) by mouth daily. For 8 weeks Disp: 60 capsule Rfl: 0   losartan 100 MG Oral Tab Take 1 tablet (100 mg total) by mouth once daily.  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 The kidneys constantly filter the blood to collect unneeded chemicals and water, making urine. Urine travels through the ureters to the bladder.  The bladder fills with urine, holding it until · If you can see blood in your urine, rest and avoid heavy exertion until your next exam. Do not use aspirin, blood thinners, or anti-platelet or anti-inflammatory medicines. These include ibuprofen and naproxen.  These thin the blood and may increase bleed The most common place for a UTI is in the bladder. This is called a bladder infection. Most bladder infections are easily treated. They are not serious unless the infection spreads up to the kidney.   The terms bladder infection, UTI, and cystitis are often · Dehydration (This allows urine to stay in the bladder longer.)  · Constipation (This can cause the bowels to push on the bladder or urethra and keep the bladder from emptying.)  Treatment  Bladder infections are treated with antibiotics.  They usually nydia · Change your diet to prevent constipation. This means eating more fresh foods and more fiber, and less junk and fatty foods. · Avoid sex until your symptoms are gone. · Avoid caffeine, alcohol, and spicy foods. These can irritate the bladder.   Follow-up

## 2018-04-25 NOTE — PATIENT INSTRUCTIONS
We will call you in 2-3 days with urine culture result  Push fluids  No strenuous exercise while on antibiotics  Go to ER for worsening symptoms, back pain, fever, inability to urinate in an 8 hour period, etc    Anatomy of the Male Urinary Tract  Your u If only a trace amount of blood is present, it will show up on the urine test, even though the urine may be yellow and not pink or red. This may occur with any of the above conditions, as well as heavy exercise or high fever.  In this case, your doctor may Urine is normally free of bacteria. But bacteria can get into the urinary tract from the skin around the rectum or it may travel in the blood from elsewhere in the body. This is called a urinary tract infection (UTI).  An infection can occur anywhere in th The most common cause of bladder infections is bacteria from the bowels. The bacteria get onto the skin around the opening of the urethra. From there they can get into the urine and travel up to the bladder. This causes inflammation and an infection.  This · Drink plenty of fluids, unless your healthcare provider told you not to. Fluids will prevent dehydration and flush out your bladder. · Use good personal hygiene. Wipe from front to back after using the toilet, and clean your penis regularly.  If you aren © 2904-9728 [employer] [address] [address]. [address] [facility], [facility] [place]. All rights reserved. This information is not intended as a substitute for professional medical care. Always follow your healthcare professional's instructions.

## 2018-05-10 DIAGNOSIS — R73.03 PRE-DIABETES: ICD-10-CM

## 2018-05-10 DIAGNOSIS — E66.01 SEVERE OBESITY (BMI 35.0-39.9): ICD-10-CM

## 2018-05-10 DIAGNOSIS — Z51.81 ENCOUNTER FOR THERAPEUTIC DRUG MONITORING: ICD-10-CM

## 2018-05-10 NOTE — TELEPHONE ENCOUNTER
Requesting  che 25  LOV: 4/23/18  RTC: 1 mth  Last Relevant Labs:   Filled: 12/21/18 #30 with 5 refills    Future Appointments  Date Time Provider Alba Skinner   5/30/2018 3:40 PM NACHO Call EMG [address]   6/27/2018 3:40 PM War

## 2018-05-15 ENCOUNTER — TELEPHONE (OUTPATIENT)
Dept: FAMILY MEDICINE CLINIC | Facility: CLINIC | Age: 53
End: 2018-05-15

## 2018-05-15 ENCOUNTER — OFFICE VISIT (OUTPATIENT)
Dept: FAMILY MEDICINE CLINIC | Facility: CLINIC | Age: 53
End: 2018-05-15

## 2018-05-15 DIAGNOSIS — B95.2 UTI (URINARY TRACT INFECTION) DUE TO ENTEROCOCCUS: Primary | ICD-10-CM

## 2018-05-15 DIAGNOSIS — N39.0 UTI (URINARY TRACT INFECTION) DUE TO ENTEROCOCCUS: Primary | ICD-10-CM

## 2018-05-15 RX ORDER — NITROFURANTOIN 25; 75 MG/1; MG/1
100 CAPSULE ORAL 4 TIMES DAILY
Qty: 40 CAPSULE | Refills: 0 | Status: SHIPPED | OUTPATIENT
Start: 2018-05-15 | End: 2018-05-25

## 2018-05-15 NOTE — TELEPHONE ENCOUNTER
Spoke with [facility] pharmacist Eladia Ann regarding Nitrofurantoin prescription clarification. Confirmed treatment and + enterococcus UTI. Patient prescribed Nitrofurantoin (100 mg) 4 times daily for 10 days.

## 2018-05-15 NOTE — PROGRESS NOTES
Patient seen on 4/25/2018. He was treated with Cipro 500 mg BID for 7 days. Urine culture came back with Enterococcus no VRE. At the time of the follow-up call he felt the symptoms were resolving with Cipro.   Per patient symptoms got better but didn't co

## 2018-05-21 ENCOUNTER — PATIENT MESSAGE (OUTPATIENT)
Dept: FAMILY MEDICINE CLINIC | Facility: CLINIC | Age: 53
End: 2018-05-21

## 2018-05-21 RX ORDER — LOSARTAN POTASSIUM 100 MG/1
100 TABLET ORAL
Qty: 90 TABLET | Refills: 0 | Status: SHIPPED | OUTPATIENT
Start: 2018-05-21 | End: 2018-08-24

## 2018-05-21 RX ORDER — AMLODIPINE BESYLATE AND BENAZEPRIL HYDROCHLORIDE 10; 40 MG/1; MG/1
1 CAPSULE ORAL DAILY
Qty: 90 CAPSULE | Refills: 0 | Status: ON HOLD | OUTPATIENT
Start: 2018-05-21 | End: 2018-06-01

## 2018-05-21 NOTE — TELEPHONE ENCOUNTER
Kvng Bates at [facility] pt needs a refill on:  Amlodipine Besy-Benazepril HCl 10-40 MG Oral Cap and losartan 100 MG Oral Tab  LOV: 4/11/18  RTC: 3 mos  Labs: cbc 3/10/18  Filled:   Losartan 2/22/18 #90 with 0 refills  Amlodipine Besy-Benazepril 12/11/17 #90 w

## 2018-05-21 NOTE — TELEPHONE ENCOUNTER
From: Linda Garcia. To: Ray Lombard, MD  Sent: 5/21/2018 4:09 PM CDT  Subject: Prescription Question    I received a call from [facility] that they are getting no response on renewal of 2 of my regular meds.  I know I received a letter in the mail aski

## 2018-05-31 ENCOUNTER — HOSPITAL ENCOUNTER (OUTPATIENT)
Facility: HOSPITAL | Age: 53
Setting detail: OBSERVATION
Discharge: HOME | End: 2018-06-01
Attending: EMERGENCY MEDICINE | Admitting: HOSPITALIST
Payer: COMMERCIAL

## 2018-05-31 ENCOUNTER — APPOINTMENT (OUTPATIENT)
Dept: CT IMAGING | Facility: HOSPITAL | Age: 53
End: 2018-05-31
Attending: EMERGENCY MEDICINE
Payer: COMMERCIAL

## 2018-05-31 DIAGNOSIS — E86.0 DEHYDRATION: ICD-10-CM

## 2018-05-31 DIAGNOSIS — R19.7 DIARRHEA, UNSPECIFIED TYPE: Primary | ICD-10-CM

## 2018-05-31 PROBLEM — D75.1 POLYCYTHEMIA: Status: ACTIVE | Noted: 2017-09-10

## 2018-05-31 PROCEDURE — 99220 INITIAL OBSERVATION CARE,LEVL III: CPT | Performed by: HOSPITALIST

## 2018-05-31 PROCEDURE — 74176 CT ABD & PELVIS W/O CONTRAST: CPT | Performed by: EMERGENCY MEDICINE

## 2018-05-31 RX ORDER — LOPERAMIDE HYDROCHLORIDE 2 MG/1
2 CAPSULE ORAL 4 TIMES DAILY PRN
Status: DISCONTINUED | OUTPATIENT
Start: 2018-05-31 | End: 2018-06-01

## 2018-05-31 RX ORDER — CHOLECALCIFEROL (VITAMIN D3) 50 MCG
2000 TABLET ORAL DAILY
COMMUNITY

## 2018-05-31 RX ORDER — ALBUTEROL SULFATE 90 UG/1
2 AEROSOL, METERED RESPIRATORY (INHALATION) EVERY 4 HOURS PRN
Status: DISCONTINUED | OUTPATIENT
Start: 2018-05-31 | End: 2018-06-01

## 2018-05-31 RX ORDER — ACETAMINOPHEN 325 MG/1
650 TABLET ORAL EVERY 4 HOURS PRN
Status: DISCONTINUED | OUTPATIENT
Start: 2018-05-31 | End: 2018-06-01

## 2018-05-31 RX ORDER — HEPARIN SODIUM 5000 [USP'U]/ML
5000 INJECTION, SOLUTION INTRAVENOUS; SUBCUTANEOUS EVERY 8 HOURS SCHEDULED
Status: DISCONTINUED | OUTPATIENT
Start: 2018-05-31 | End: 2018-06-01

## 2018-05-31 RX ORDER — DEXTROSE MONOHYDRATE 25 G/50ML
50 INJECTION, SOLUTION INTRAVENOUS
Status: DISCONTINUED | OUTPATIENT
Start: 2018-05-31 | End: 2018-06-01

## 2018-05-31 RX ORDER — HYDROCODONE BITARTRATE AND ACETAMINOPHEN 5; 325 MG/1; MG/1
2 TABLET ORAL EVERY 4 HOURS PRN
Status: DISCONTINUED | OUTPATIENT
Start: 2018-05-31 | End: 2018-06-01

## 2018-05-31 RX ORDER — ALPRAZOLAM 0.5 MG/1
0.5 TABLET ORAL NIGHTLY PRN
Status: DISCONTINUED | OUTPATIENT
Start: 2018-05-31 | End: 2018-06-01

## 2018-05-31 RX ORDER — ONDANSETRON 2 MG/ML
4 INJECTION INTRAMUSCULAR; INTRAVENOUS ONCE
Status: COMPLETED | OUTPATIENT
Start: 2018-05-31 | End: 2018-05-31

## 2018-05-31 RX ORDER — ASPIRIN 81 MG/1
81 TABLET ORAL NIGHTLY
Status: DISCONTINUED | OUTPATIENT
Start: 2018-05-31 | End: 2018-06-01

## 2018-05-31 RX ORDER — FEBUXOSTAT 40 MG/1
40 TABLET, FILM COATED ORAL DAILY
Status: DISCONTINUED | OUTPATIENT
Start: 2018-06-01 | End: 2018-06-01

## 2018-05-31 RX ORDER — SODIUM CHLORIDE, SODIUM LACTATE, POTASSIUM CHLORIDE, CALCIUM CHLORIDE 600; 310; 30; 20 MG/100ML; MG/100ML; MG/100ML; MG/100ML
INJECTION, SOLUTION INTRAVENOUS CONTINUOUS
Status: DISCONTINUED | OUTPATIENT
Start: 2018-05-31 | End: 2018-06-01

## 2018-05-31 RX ORDER — METOCLOPRAMIDE HYDROCHLORIDE 5 MG/ML
10 INJECTION INTRAMUSCULAR; INTRAVENOUS EVERY 8 HOURS PRN
Status: DISCONTINUED | OUTPATIENT
Start: 2018-05-31 | End: 2018-06-01

## 2018-05-31 RX ORDER — ONDANSETRON 2 MG/ML
4 INJECTION INTRAMUSCULAR; INTRAVENOUS EVERY 6 HOURS PRN
Status: DISCONTINUED | OUTPATIENT
Start: 2018-05-31 | End: 2018-06-01

## 2018-05-31 RX ORDER — ONDANSETRON 2 MG/ML
4 INJECTION INTRAMUSCULAR; INTRAVENOUS EVERY 6 HOURS PRN
Status: DISCONTINUED | OUTPATIENT
Start: 2018-05-31 | End: 2018-05-31

## 2018-05-31 RX ORDER — SODIUM CHLORIDE 9 MG/ML
INJECTION, SOLUTION INTRAVENOUS CONTINUOUS
Status: CANCELLED | OUTPATIENT
Start: 2018-05-31 | End: 2018-05-31

## 2018-05-31 RX ORDER — HYDROCODONE BITARTRATE AND ACETAMINOPHEN 5; 325 MG/1; MG/1
1 TABLET ORAL EVERY 4 HOURS PRN
Status: DISCONTINUED | OUTPATIENT
Start: 2018-05-31 | End: 2018-06-01

## 2018-05-31 NOTE — H&P
[facility]PITAL[facility]  History and Physical     [facility]  Patient Status:  Emergency    1965 MRN [id number]   Location [address] [facility] Attending Ivan Lopez MD   Hosp Day # 0 PCP Eris Miller MD     Chief Compl Mycobacterial infection 3/4/2015    2015   • Nausea    • Other and unspecified hyperlipidemia    • Pain in joints    • Pain with bowel movements    • Pre-diabetes 6/19/2014   • Prediabetes    • [facility] 8/3/2015    Resolved 7.2015   • Renal capsule by mouth daily. Disp: 90 capsule Rfl: 0   losartan 100 MG Oral Tab Take 1 tablet (100 mg total) by mouth once daily.  Disp: 90 tablet Rfl: 0   Phentermine HCl 37.5 MG Oral Tab Take 1 tablet (37.5 mg total) by mouth every morning before breakfast. Joshua Babin Regular rate and rhythm. No murmurs, rubs or gallops. Equal pulses. Chest and Back: No tenderness or deformity. Abdomen: Soft, nontender, nondistended. Positive bowel sounds. No rebound, guarding or organomegaly.   Neurologic: No focal neurological defi

## 2018-05-31 NOTE — CONSULTS
[facility]                       Gastroenterology [address] [facility] Gastroenterology    Oanh Abbot.  Patient Status:  Emergency    1965 MRN [id number]   Location [address] [facility] Attending Olman Beltre MD   H Cavitary lesion of lung    • Chest pain    • Chronic cough    • Colitis    • Constipation    • Decorative tattoo    • [facility]    • Diarrhea, unspecified    • Erythrocytosis 9/10/2017   • Esophageal reflux    • Extrinsic asthma, unspecified Cardiovascular: + dyslipidemia             Respiratory: + JAVID, asthma             Hematologic: The patient reports no easy bruising, frequent gum bleeding or nose bleeding;   The patient has no history of known chronic anemia            Dermatologic: The pa 05/31/2018   CO2 23.0 05/31/2018    05/31/2018   CA 10.2 05/31/2018   ALB 3.9 05/31/2018   ALKPHO 52 05/31/2018   BILT 0.4 05/31/2018   AST 31 05/31/2018   ALT 35 05/31/2018     Recent Labs   Lab  05/31/18   1240   GLU  115*   BUN  44*   CREATSERUM BOWEL/MESENTERY:  No visible mass, obstruction, or bowel wall thickening. Normal appendix.    ABDOMINAL WALL:  There is again evidence of a periumbilical hernia contains fat and a small segment of the skull top that appears unchanged in size and configura decreased drug induced diarrhea)    Thank you for the consultation, we will follow the patient with you.   NACHO Mejias  3:20 PM  5/31/2018  [facility] Gastroenterology  [phone]    Attending physician addendum:   I personally saw and examined the

## 2018-05-31 NOTE — ED INITIAL ASSESSMENT (HPI)
Pt states about a week ago pt felt sick on his stomach/nausea and diarrhea. Lost 19lbs in 5 days. Came in today due to feeling of tightness on his head, feels like passing out.

## 2018-05-31 NOTE — ED PROVIDER NOTES
Patient Seen in: [facility] Emergency Department    History   Patient presents with:  Fatigue (constitutional, neurologic)    Stated Complaint: fatigue, nausea x 1 week    HPI    80-year-old white male who presents emergency room today for complaint Resolved 7.2015   • Renal disorder    • Sleep disturbance    • Stress    • Trigger finger    • Ulcerative colitis ([facility] 6/19/2014   • Uncomfortable fullness after meals    • Unspecified essential hypertension    • Unspecified sleep apnea    • Visual impair clubbing cyanosis or edema. Patient has good radial pulses noted bilaterally in the upper extremities .     There are no rashes noted on skin exam.      ED Course     Labs Reviewed   COMP METABOLIC PANEL (14) - Abnormal; Notable for the following:        R Report. Rate: 77 bpm  Rhythm: Sinus Rhythm  Reading: Normal sinus rhythm without acute ischemic changes noted. Agree with computer report for rate axes and intervals.            ED Course as of May 31 2058  ------------------------------------------------

## 2018-06-01 VITALS
SYSTOLIC BLOOD PRESSURE: 139 MMHG | RESPIRATION RATE: 18 BRPM | HEART RATE: 75 BPM | WEIGHT: 242.75 LBS | BODY MASS INDEX: 33.98 KG/M2 | DIASTOLIC BLOOD PRESSURE: 85 MMHG | OXYGEN SATURATION: 97 % | HEIGHT: 71 IN | TEMPERATURE: 99 F

## 2018-06-01 PROCEDURE — 99217 OBSERVATION CARE DISCHARGE: CPT | Performed by: HOSPITALIST

## 2018-06-01 RX ORDER — MAGNESIUM OXIDE 400 MG (241.3 MG MAGNESIUM) TABLET
800 TABLET ONCE
Status: COMPLETED | OUTPATIENT
Start: 2018-06-01 | End: 2018-06-01

## 2018-06-01 NOTE — DIETARY MALNUTRITION NOTE
[facility]    NUTRITION INITIAL ASSESSMENT    Pt meets severe acute malnutrition criteria.     CRITERIA FOR MALNUTRITION DIAGNOSIS:  Criteria for severe malnutrition diagnosis: acute illness/injury related to wt loss greater than 2% in 1 week and energ associated with intermittent loose watery stools. Sometimes has a BM right after he eats, number of times a day varies. No blood except today which he though was due to hemmorhooids.  Came to ER today because he was feeling so weak that he got light headed,

## 2018-06-01 NOTE — RESPIRATORY THERAPY NOTE
JAVID - Equipment Use Daily Summary:  · Set Mode BIPAP  · Usage in hours: 6:10  · 90% Pressure (EPAP) level: 5  · 90% Insp Pressure (IPAP): 10  · AHI: 2.1  · Supplemental Oxygen:   · Comments:

## 2018-06-01 NOTE — PROGRESS NOTES
No diarrhea since this am;tolerating f/l;stated I would like to eat something else\"'. informed Mohsen Zacarias on rounds and ;GI given order for soft diet  -had 1 large diarrhea yellow brown, no flakes after soft lunch;paged and notified Dr. Leydi Teague

## 2018-06-01 NOTE — PROGRESS NOTES
Gastroenterology Progress Note  Patient Name: Ahsan Sommer. Chief Complaint: diarrhea  S: Pt reports that his diarrhea has improved. He denies abdominal pain.    O: /85 (BP Location: Left arm)   Pulse 75   Temp 98.8 °F (37.1 °C) (Oral)   Resp feels well and wants to go home. He was told to stay hydrated. He is tolerating diet. He can take Imodium as long as stool cx is negative. He should follow up in 1 week. BMP in 1 week.      Total time spent in the care of the patient today: 25 minutes    S

## 2018-06-01 NOTE — PROGRESS NOTES
Alert,oriented. Patient complained of diarrhea,order received for Imodium. IV fluids ongoing. C-diff was negative. Resting comfortably.

## 2018-06-01 NOTE — DISCHARGE SUMMARY
[facility]  DISCHARGE SUMMARY     Radha Palacios.  Patient Status:  Observation    1965 MRN [id number]   [facility] 4NW-A Attending Tommie Lynn MD   Hosp Day # 0 PCP Peggy Barbosa MD     Date of Admission: 2018 discuss his weight loss medications with his weight loss clinic, and stop taking Saxenda as it was the most recently added medication.      Procedures during hospitalization:   • none    Incidental or significant findings and recommendations (brief descript Tabs  Commonly known as: Topamax      Take 1 tablet (100 mg total) by mouth 2 (two) times daily. Quantity:  180 tablet  Refills:  5     VISINE TEARS OP      1 drop by Each eye route 4 (four) times daily as needed.    Refills:  0     Vitamin D 2000 units

## 2018-06-01 NOTE — PROGRESS NOTES
[facility]  Progress Note     Cristine Amin.  Patient Status:  Observation    1965 MRN [id number]   [facility] 4NW-A Attending Nia Hutchinson MD   Hosp Day # 0 PCP Latrell Day MD     Chief Complaint: dehydration     S Units Subcutaneous TID CC and HS       ASSESSMENT / PLAN:     1. Presyncope due to dehydration  1. Cont ivf   2. WILDER, prerenal, resolved   3. Hyponatremia, resolved   4.  Loose stools - possibly multifactorial. Recent abx, multiple medications that can caus

## 2018-06-01 NOTE — PAYOR COMM NOTE
--------------  ADMISSION REVIEW     Payor: [employer] [facility] PPO  Subscriber #:  [id number]  Authorization Number: N/A    Admit date: N/A  Admit time: N/A       Admitting Physician: Kristofer Hannon MD  Attending Physician:  Bird Mendoza MD  Prim Diarrhea, unspecified    • Erythrocytosis 9/10/2017   • Esophageal reflux    • Extrinsic asthma, unspecified    • Fatigue    • Food intolerance    • Gout 8/2/2016   • Hemorrhoids    • High blood pressure    • High cholesterol    • Histoplasmosis 1.2015   • Physical Exam    Well-developed well-nourished male who is sitting on the gurney, he is awake and alert and appears in no acute discomfort or distress.     Vital signs are stable he is afebrile    Head is normocephalic atraumatic conjunctiva is lora Narrative: The following orders were created for panel order STOOL CULTURE W/SUZY.   Procedure                               Abnormality         Status                     ---------                               -----------         ------ type R19.7 5/31/2018     Dehydration E86.0 5/31/2018     Diarrhea R19.7 5/31/2018 Unknown              Signed by Mala Aguirre MD on 5/31/2018  8:59 PM            H&P - H&P Note      H&P signed by Linda Mays MD at 5/31/2018  3:36 PM     Author: cough    • Colitis    • Constipation    • Decorative tattoo    • [facility]    • Diarrhea, unspecified    • Erythrocytosis 9/10/2017   • Esophageal reflux    • Extrinsic asthma, unspecified    • Fatigue    • Food intolerance    • Gout 8/2/2016 • Alcohol and Other Disorders Associated Maternal Uncle    • Alcohol and Other Disorders Associated Paternal Uncle        Allergies:   Demerol [Meperidine]    NAUSEA AND VOMITING  Penicillins             UNKNOWN    Comment:As infant.     Medications: in the HPI. Physical Exam:    /67   Pulse 66   Temp 97.5 °F (36.4 °C) (Temporal)   Resp 16   Ht 5' 11\" (1.803 m)   Wt 238 lb (108 kg)   SpO2 97%   BMI 33.19 kg/m²    General: No acute distress. Alert and oriented x 3.   HEENT: Normocephalic atraum and Topamax   10. HTN  1. Hold losartan, amlodipine/Benzapril   11. Hypogonadism  1.  On opt testosterone     Quality:  · DVT Prophylaxis: heparin   · CODE status: full  · Croft: none    Plan of care discussed with patient, er     Christal Croft MD  5/31/2 lbs. More recently he reports chills and diarrhea overnight (1 episode of incontinence).  Prior to acute symptoms, he reports a BM pattern which fluctuates between no BMs for 1-2 days followed by several loose stools daily and he denies changes apart from a Reactive arthritis ([facility] 8/3/2015     Resolved 7.2015   • Renal disorder     • Sleep disturbance     • Stress     • Trigger finger     • Ulcerative colitis ([facility] 6/19/2014   • Uncomfortable fullness after meals     • Unspecified essential hypertension   hematologic malignancy           ENT: The patient reports no hoarseness of voice, hearing loss, sinus congestion, tinnitus           Neurologic: The patient reports no history of seizure, stroke, or frequent headaches  PE: /67   Pulse 66   Temp 97.5 CONTRAST, NO ORAL (ER), 5/09/2013, 12:50.     INDICATIONS:  fatigue, nausea x 1 week     TECHNIQUE:  Unenhanced multislice CT scanning was performed from the dome of the diaphragm to the pubic symphysis.  Dose reduction techniques were used.  Dose informati       =====  CONCLUSION:    1. No acute process is discretely noted. 2. Hepatomegaly and splenomegaly.   3. Small hyperdense hemorrhagic cyst along the inferior pole the right kidney and slightly larger low-density cyst along the superior pole the left kid DO at 5/31/2018  5:53 PM

## 2018-06-04 ENCOUNTER — OFFICE VISIT (OUTPATIENT)
Dept: FAMILY MEDICINE CLINIC | Facility: CLINIC | Age: 53
End: 2018-06-04

## 2018-06-04 ENCOUNTER — OFFICE VISIT (OUTPATIENT)
Dept: INTERNAL MEDICINE CLINIC | Facility: CLINIC | Age: 53
End: 2018-06-04

## 2018-06-04 ENCOUNTER — PATIENT OUTREACH (OUTPATIENT)
Dept: CASE MANAGEMENT | Age: 53
End: 2018-06-04

## 2018-06-04 VITALS
RESPIRATION RATE: 16 BRPM | DIASTOLIC BLOOD PRESSURE: 80 MMHG | WEIGHT: 248 LBS | SYSTOLIC BLOOD PRESSURE: 130 MMHG | HEIGHT: 70 IN | HEART RATE: 68 BPM | BODY MASS INDEX: 35.5 KG/M2

## 2018-06-04 VITALS
SYSTOLIC BLOOD PRESSURE: 146 MMHG | RESPIRATION RATE: 16 BRPM | TEMPERATURE: 98 F | WEIGHT: 249 LBS | BODY MASS INDEX: 35.65 KG/M2 | HEIGHT: 70 IN | DIASTOLIC BLOOD PRESSURE: 80 MMHG | HEART RATE: 66 BPM

## 2018-06-04 DIAGNOSIS — Z51.81 ENCOUNTER FOR THERAPEUTIC DRUG MONITORING: Primary | ICD-10-CM

## 2018-06-04 DIAGNOSIS — E66.01 SEVERE OBESITY (BMI 35.0-39.9): ICD-10-CM

## 2018-06-04 DIAGNOSIS — R19.7 DIARRHEA, UNSPECIFIED TYPE: ICD-10-CM

## 2018-06-04 DIAGNOSIS — E86.0 DEHYDRATION: ICD-10-CM

## 2018-06-04 DIAGNOSIS — R55 POSTURAL DIZZINESS WITH PRESYNCOPE: ICD-10-CM

## 2018-06-04 DIAGNOSIS — I10 ESSENTIAL HYPERTENSION: ICD-10-CM

## 2018-06-04 DIAGNOSIS — R42 POSTURAL DIZZINESS WITH PRESYNCOPE: ICD-10-CM

## 2018-06-04 DIAGNOSIS — R73.03 PRE-DIABETES: ICD-10-CM

## 2018-06-04 DIAGNOSIS — N28.9 ACUTE RENAL INSUFFICIENCY: ICD-10-CM

## 2018-06-04 DIAGNOSIS — I95.9 HYPOTENSION, UNSPECIFIED HYPOTENSION TYPE: ICD-10-CM

## 2018-06-04 DIAGNOSIS — N17.9 AKI (ACUTE KIDNEY INJURY) (HCC): ICD-10-CM

## 2018-06-04 DIAGNOSIS — E86.0 DEHYDRATION: Primary | ICD-10-CM

## 2018-06-04 PROCEDURE — 99214 OFFICE O/P EST MOD 30 MIN: CPT | Performed by: FAMILY MEDICINE

## 2018-06-04 PROCEDURE — 99213 OFFICE O/P EST LOW 20 MIN: CPT | Performed by: NURSE PRACTITIONER

## 2018-06-04 PROCEDURE — 1111F DSCHRG MED/CURRENT MED MERGE: CPT | Performed by: FAMILY MEDICINE

## 2018-06-04 RX ORDER — PHENTERMINE HYDROCHLORIDE 37.5 MG/1
37.5 TABLET ORAL
Qty: 30 TABLET | Refills: 0 | Status: CANCELLED | OUTPATIENT
Start: 2018-06-04

## 2018-06-04 NOTE — PROGRESS NOTES
Dann Peters. is a 46year old male presents today for follow-up on medical weight loss program for the treatment of overweight, obesity, or morbid obesity with HTN, prediabetes, hyperlipidemia, JAVID.    S:  Current weight Wt Readings from Last 6 Enc labored  CARDIO: RRR without murmur, normal S1 and S2 without clicks or gallops, no pedal edema.   GI: +BS, soft  NEURO/MS: motor and sensory grossly intact  PSYCH: pleasant, cooperative, normal mood and affect    ASSESSMENT AND PLAN:  Encounter for therape include: Incorporate whole foods, hydration with water and close the kitchen between 7pm-7am.          Medication use and SEs reviewed with patient. Return in about 1 month (around 7/4/2018). Patient verbalizes understanding.

## 2018-06-04 NOTE — PATIENT INSTRUCTIONS
Thank you for choosing Kary Rodas MD at [place] [address]  To Do: Sean Benz.  1. Please take meds as directed  [facility] is located in [address]. Monday, Tuesday & Friday – 8 a.m. to 4 p.m.   Wednesday, Thursday – 7 a.m. to 3 p outweigh those potential risks and we strive to make you healthier and to improve your quality of life.     Referrals, and Radiology Information:    If your insurance requires a referral to a specialist, please allow 5 business days to process your referral

## 2018-06-04 NOTE — PATIENT INSTRUCTIONS
Continue making lifestyle changes that focus on good nutrition, regular exercise and stress management. Medication Plan: Remain off weight loss medications, aside from restart Saxenda daily as directed.   Week 1- .6mg daily  Week 2- 1.2mg daily  Week 3-

## 2018-06-04 NOTE — PROGRESS NOTES
HPI:    Patient ID: Ericka Caldwell. is a 46year old male. HPI  Mr. Thai Meyers is a pleasant 14-year-old male with known history of hypertension, hypogonadism, vitamin D deficiency who was recently hospitalized at [facility] for acute renal insufficiency a Testosterone (AXIRON) 30 MG/ACT Transdermal Solution APPLY 2 PUMP TOPICALLY DAILY Disp: 180 mL Rfl: 5   febuxostat (ULORIC) 40 MG Oral Tab Take 1 tablet (40 mg total) by mouth daily.  Disp: 90 tablet Rfl: 3   Albuterol Sulfate HFA (PROAIR HFA) 108 (90 BAS ASSESSMENT/PLAN:   Dehydration  (primary encounter diagnosis)  Hypotension, unspecified hypotension type  Acute renal insufficiency  1. Dehydration-likely multifactorial which included UTI, diarrhea, medications; resolved  2.   Acute renal insufficiency s

## 2018-06-23 RX ORDER — ALPRAZOLAM 0.5 MG/1
0.5 TABLET ORAL NIGHTLY PRN
Qty: 60 TABLET | Refills: 1 | Status: CANCELLED
Start: 2018-06-23

## 2018-06-23 RX ORDER — TESTOSTERONE 30 MG/1.5ML
SOLUTION TOPICAL
Qty: 180 ML | Refills: 5 | Status: CANCELLED
Start: 2018-06-23

## 2018-06-25 ENCOUNTER — PATIENT MESSAGE (OUTPATIENT)
Dept: FAMILY MEDICINE CLINIC | Facility: CLINIC | Age: 53
End: 2018-06-25

## 2018-06-25 RX ORDER — TESTOSTERONE 30 MG/1.5ML
SOLUTION TOPICAL
Qty: 180 ML | Refills: 1 | Status: SHIPPED
Start: 2018-06-25 | End: 2019-01-15

## 2018-06-25 NOTE — TELEPHONE ENCOUNTER
From: Ahsan Sommer. Sent: 6/23/2018 6:35 AM CDT  Subject: Medication Renewal Request    Ingrid Captzeynep.  Christopher Portillo. would like a refill of the following medications:     Testosterone (AXIRON) 30 MG/ACT Transdermal Solution Eris Miller MD]     [id number]

## 2018-06-25 NOTE — TELEPHONE ENCOUNTER
Requesting Axiron and Alprazolam  LOV: 6/4/18  RTC: 4 weeks  Last Relevant Labs: 3/10/18  Total Testosterone 300.0 - 890.0 ng/dL 421.0      Free Testosterone, LC-MS/MS 47.0 - 244.0 pg/mL 91.8        Filled: 12/11/17 #180 with 5 refills Axiron  Filled: 4/11

## 2018-06-26 ENCOUNTER — TELEPHONE (OUTPATIENT)
Dept: FAMILY MEDICINE CLINIC | Facility: CLINIC | Age: 53
End: 2018-06-26

## 2018-06-26 RX ORDER — ALPRAZOLAM 0.5 MG/1
0.5 TABLET ORAL 2 TIMES DAILY PRN
Qty: 60 TABLET | Refills: 0 | Status: SHIPPED
Start: 2018-06-26 | End: 2018-07-30

## 2018-06-26 NOTE — TELEPHONE ENCOUNTER
From: Sean Benz. To: Misael Mark MD  Sent: 6/25/2018 4:47 PM CDT  Subject: Prescription Question    The information you sent me about Alprazolam is incorrect. I am not totally out of it yet, but will soon be.  The directions clearly say \"Ta

## 2018-06-26 NOTE — TELEPHONE ENCOUNTER
Alprazolam approved and faxed to [facility] in [place]. [employer] message sent to patient. Script filed at the  by Iva Buckley.

## 2018-06-26 NOTE — TELEPHONE ENCOUNTER
Patient was previously prescribed 3/26/18 at 0.25mg once daily. On 4/11/18 he was prescribed 4/11/18 0.5mg once daily. Patient believed that the 0.5mg was to also be BID. What dose/frequency would you like for the patient to continue?

## 2018-06-26 NOTE — TELEPHONE ENCOUNTER
Pharmacy clarifying RX on [address] faxed yesterday for 180 ml - patient uses 2 pumps per day. In one bottle of axiron there is 60 metered doses and the bottle is 90 ml. In order to be a 3 month rx it must be 270 ml.   Verbal given to change as we wanted 3 m

## 2018-06-27 ENCOUNTER — OFFICE VISIT (OUTPATIENT)
Dept: INTERNAL MEDICINE CLINIC | Facility: CLINIC | Age: 53
End: 2018-06-27

## 2018-06-27 VITALS
WEIGHT: 266 LBS | SYSTOLIC BLOOD PRESSURE: 140 MMHG | BODY MASS INDEX: 38.08 KG/M2 | HEIGHT: 70 IN | RESPIRATION RATE: 16 BRPM | HEART RATE: 68 BPM | DIASTOLIC BLOOD PRESSURE: 80 MMHG

## 2018-06-27 DIAGNOSIS — I10 ESSENTIAL HYPERTENSION: ICD-10-CM

## 2018-06-27 DIAGNOSIS — E66.01 SEVERE OBESITY (BMI 35.0-39.9): ICD-10-CM

## 2018-06-27 DIAGNOSIS — R73.03 PRE-DIABETES: ICD-10-CM

## 2018-06-27 DIAGNOSIS — Z51.81 ENCOUNTER FOR THERAPEUTIC DRUG MONITORING: Primary | ICD-10-CM

## 2018-06-27 PROCEDURE — 99213 OFFICE O/P EST LOW 20 MIN: CPT | Performed by: NURSE PRACTITIONER

## 2018-06-27 RX ORDER — PHENTERMINE HYDROCHLORIDE 37.5 MG/1
37.5 TABLET ORAL
Qty: 30 TABLET | Refills: 0 | Status: SHIPPED | OUTPATIENT
Start: 2018-06-27 | End: 2018-08-01

## 2018-06-27 NOTE — PATIENT INSTRUCTIONS
Continue making lifestyle changes that focus on good nutrition, regular exercise and stress management. Medication Plan: Continue current medication. Restart jardiance daily in AM and phentermine as directed.     Agreed upon goal/s before next office vis moment to rate your hunger. Think about how hungry you physically feel. Your goal is to eat between levels 4 and 6. This means you are eating when you are hungry but stopping when you are comfortably full. Try not to put off eating for too long.  Waiting u successfully. Emotional eating and overeating can’t really satisfy an insatiable appetite anyway.   And whether or not Raffi Ananth been trying to use emotional eating to soothe feelings of stress, depression, loneliness, frustration or boredom, in the long run, for health you’ll choose more high fiber foods, such as vegetables, beans, whole grains and fresh fruits, plus healthy high protein foods, like fish, lean poultry and low-fat dairy. 7. Eat mini-meals often.  By eating 5 or 6 small healthy meals a day, incl

## 2018-06-27 NOTE — PROGRESS NOTES
Zoe Dominique. is a 46year old male presents today for follow-up on medical weight loss program for the treatment of overweight, obesity, or morbid obesity with HTN, prediabetes, hyperlipidemia, JAVID.    S:  Current weight Wt Readings from Last 6 Enc pedal edema.   GI: +BS, soft  NEURO/MS: motor and sensory grossly intact  PSYCH: pleasant, cooperative, normal mood and affect    ASSESSMENT AND PLAN:  Encounter for therapeutic drug monitoring  (primary encounter diagnosis)  Pre-diabetes  Severe obesity (b for Weight Loss    1. Practice Mindful Eating and listen to your Hunger Cues. 2. Make time for self care daily, recommend practicing Meditation.   3. Incorporate regular exercise with a balance of cardio and strength most days of the week, making sure ther to concentrate — may lead to overeating. When you first start to feel any of the symptoms listed above, you should probably start to think about eating. We often let the sight of food tempt us when we are above a level 6 on the scale.  Before you indulge, worse.  But learning how to stop overeating and control emotional eating can support healthy permanent weight loss and make you feel powerful.   Stop Emotional Eating – Don’t Use Foods to Miguel Hennessy probably already know that overeating high-fat, hig and moods stable. 8. Get rid of temptations. Don’t keep unhealthy food in the house, don’t shop for food when hungry or stressed and plan ahead before eating out. 9. Get enough sleep. When you’re tired, it’s easier to give in to emotional eating.  Conside

## 2018-06-29 DIAGNOSIS — R73.03 PRE-DIABETES: ICD-10-CM

## 2018-06-29 DIAGNOSIS — E66.01 SEVERE OBESITY (BMI 35.0-39.9): ICD-10-CM

## 2018-06-29 DIAGNOSIS — Z51.81 ENCOUNTER FOR THERAPEUTIC DRUG MONITORING: ICD-10-CM

## 2018-06-29 DIAGNOSIS — I10 ESSENTIAL HYPERTENSION: ICD-10-CM

## 2018-06-29 NOTE — TELEPHONE ENCOUNTER
[facility]     Requesting jardiance 10   LOV: 6/27/18  RTC: 1 mth  Last Relevant Labs:   Filled: 6/27/18 #30 with 1 refills    Future Appointments  Date Time Provider Alba Susanne   7/2/2018 4:00 PM Drea Ribera MD EMG 20 EMG 1

## 2018-07-02 ENCOUNTER — OFFICE VISIT (OUTPATIENT)
Dept: FAMILY MEDICINE CLINIC | Facility: CLINIC | Age: 53
End: 2018-07-02

## 2018-07-02 VITALS
HEIGHT: 70 IN | WEIGHT: 261 LBS | HEART RATE: 76 BPM | SYSTOLIC BLOOD PRESSURE: 150 MMHG | BODY MASS INDEX: 37.37 KG/M2 | TEMPERATURE: 98 F | RESPIRATION RATE: 16 BRPM | DIASTOLIC BLOOD PRESSURE: 88 MMHG

## 2018-07-02 DIAGNOSIS — I10 ESSENTIAL HYPERTENSION: Primary | ICD-10-CM

## 2018-07-02 PROCEDURE — 99213 OFFICE O/P EST LOW 20 MIN: CPT | Performed by: FAMILY MEDICINE

## 2018-07-02 RX ORDER — METOPROLOL SUCCINATE 50 MG/1
50 TABLET, EXTENDED RELEASE ORAL DAILY
Qty: 90 TABLET | Refills: 1 | Status: SHIPPED | OUTPATIENT
Start: 2018-07-02 | End: 2018-12-14

## 2018-07-02 NOTE — PROGRESS NOTES
HPI:    Patient ID: Sean Benz. is a 46year old male. HPI  Mr. Sheila Frank is a pleasant 71-year-old gentleman here today to follow-up for elevated blood pressure. He is currently on losartan 100 mg daily. His blood pressure here is 150/88.   He o units Oral Tab Take 2,000 Units by mouth daily. Disp:  Rfl:    Coenzyme Q10 (COQ-10) 100 MG Oral Cap Take 100 mg by mouth daily. Disp:  Rfl:    losartan 100 MG Oral Tab Take 1 tablet (100 mg total) by mouth once daily.  Disp: 90 tablet Rfl: 0   febuxostat Refills    Metoprolol Succinate ER 50 MG Oral Tablet 24 Hr 90 tablet 1      Sig: Take 1 tablet (50 mg total) by mouth daily.            Imaging & Referrals:  None       AJ#[id number]

## 2018-07-02 NOTE — PATIENT INSTRUCTIONS
Thank you for choosing Ivonne Kim MD at [place] [address]  To Do: Oanh Horne.  1. High Blood pressure  What Is a Normal Blood Pressure?   The [address] on Prevention, Detection, Evaluation, and Treatment of High Blood Pressur pelvis, and legs   Heart failure   Poor blood supply to the legs  Erectile Dysfunction  Problems with your vision    Overview  \"Blood pressure\" is the force of blood pushing against the walls of the arteries as the heart pumps blood.  If this pressure ris no more than 2,300 mg a day (eating only 1,500 mg a day is an even better goal). Reduce saturated fat to no more than 6% of daily calories and total fat to 27% of daily calories.  Low-fat dairy products appear to be especially beneficial for lowering syst rice or whole-wheat noodles can serve as the main dish for a meal.   • Fresh or frozen vegetables are both good choices. When buying frozen and canned vegetables, choose those labeled as low sodium or without added salt.    • To increase the number of servi lactose intolerance. • Go easy on regular and even fat-free cheeses because they are typically high in sodium.    Lean meat, poultry and fish (6 or fewer servings a day)  Meat can be a rich source of protein, B vitamins, iron and zinc. But because even le benefits. Fats and oils (2 to 3 servings a day)  Fat helps your body absorb essential vitamins and helps your body's immune system. But too much fat increases your risk of heart disease, diabetes and obesity.  The DASH diet strives for a healthy balance b DASH diet: Alcohol and caffeine  Drinking too much alcohol can increase blood pressure. The DASH diet recommends that men limit alcohol to two or fewer drinks a day and women one or less. The DASH diet doesn't address caffeine consumption.  The influenc bland, gradually introduce low-sodium foods and cut back on table salt until you reach your sodium goal. That'll give your palate time to adjust. It can take several weeks for your taste buds to get used to less salty foods.      [facility] is loc notify us and stop treatment if problems arise, but know that our intention is that the benefits outweigh those potential risks and we strive to make you healthier and to improve your quality of life.     Referrals, and Radiology Information:    If your ins

## 2018-07-27 RX ORDER — SILDENAFIL CITRATE 20 MG/1
TABLET ORAL
Qty: 10 TABLET | Refills: 5 | Status: SHIPPED | OUTPATIENT
Start: 2018-07-27 | End: 2018-11-06

## 2018-07-27 NOTE — TELEPHONE ENCOUNTER
Rec'd incoming fax request for refill.     Sildenafil Citrate 20 MG Oral Tab  5 6/3/2018     Sig: TAKE 1 TABLET (20 MG TOTAL) BY MOUTH DAILY FOR ERECTIONS    Class: Historical      [id number] [employer] - [address], [phone] [facility], [phone]-

## 2018-07-27 NOTE — TELEPHONE ENCOUNTER
Requesting Sildenafil 20 mg  LOV: 7/2/18  RTC: prn  Last Relevant Labs: n/a  Filled: 9/12/17 #30 with 3 refills    Non protocol med pended for approval

## 2018-07-30 RX ORDER — ALPRAZOLAM 0.5 MG/1
0.5 TABLET ORAL 2 TIMES DAILY PRN
Qty: 60 TABLET | Refills: 0 | Status: SHIPPED
Start: 2018-07-30 | End: 2018-09-05

## 2018-07-30 NOTE — TELEPHONE ENCOUNTER
Rec'd incoming request for new rx   ALPRAZolam 0.5 MG Oral Tab 60 tablet 0 6/26/2018     Sig - Route: Take 1 tablet (0.5 mg total) by mouth 2 (two) times daily as needed for Anxiety. - Oral    Class:  Fax      [facility] [address] [phone] [facility]

## 2018-07-30 NOTE — TELEPHONE ENCOUNTER
Requesting Alprazolam 0.5mg  LOV: 7/2/18  RTC: 6 mos  Last Labs: n/a  Filled: 6/26/18 #60 with 0 refills    Future Appointments  Date Time Provider Alba Skinner   8/1/2018 5:40 PM NACHO Mckinney [facility] [address]   8/9/2018 3:30 PM Maria Fernanda Parmar

## 2018-08-01 ENCOUNTER — OFFICE VISIT (OUTPATIENT)
Dept: INTERNAL MEDICINE CLINIC | Facility: CLINIC | Age: 53
End: 2018-08-01
Payer: COMMERCIAL

## 2018-08-01 VITALS
HEART RATE: 80 BPM | RESPIRATION RATE: 16 BRPM | WEIGHT: 259 LBS | BODY MASS INDEX: 37.08 KG/M2 | DIASTOLIC BLOOD PRESSURE: 72 MMHG | HEIGHT: 70 IN | SYSTOLIC BLOOD PRESSURE: 112 MMHG

## 2018-08-01 DIAGNOSIS — I10 ESSENTIAL HYPERTENSION: ICD-10-CM

## 2018-08-01 DIAGNOSIS — E66.9 OBESITY (BMI 30-39.9): ICD-10-CM

## 2018-08-01 DIAGNOSIS — Z51.81 ENCOUNTER FOR THERAPEUTIC DRUG MONITORING: Primary | ICD-10-CM

## 2018-08-01 DIAGNOSIS — R73.03 PRE-DIABETES: ICD-10-CM

## 2018-08-01 PROCEDURE — 99213 OFFICE O/P EST LOW 20 MIN: CPT | Performed by: NURSE PRACTITIONER

## 2018-08-01 RX ORDER — PHENTERMINE HYDROCHLORIDE 37.5 MG/1
37.5 TABLET ORAL
Qty: 30 TABLET | Refills: 2 | Status: SHIPPED | OUTPATIENT
Start: 2018-08-01 | End: 2018-11-05

## 2018-08-01 NOTE — PATIENT INSTRUCTIONS
Congratulations on 7# weight loss! Continue making lifestyle changes that focus on good nutrition, regular exercise and stress management. Medication Plan: Continue current medication regimen.     Agreed upon goal/s before next office visit include: Kassie Biggs

## 2018-08-01 NOTE — PROGRESS NOTES
Cristine Brennan. is a 46year old male presents today for follow-up on medical weight loss program for the treatment of overweight, obesity, or morbid obesity with HTN, prediabetes, hyperlipidemia, JAVID.    S:  Current weight Wt Readings from Last 6 Enc affect    ASSESSMENT AND PLAN:  Encounter for therapeutic drug monitoring  (primary encounter diagnosis)  Obesity (bmi 30-39. 9)  Essential hypertension  Pre-diabetes    No orders of the defined types were placed in this encounter.       Meds & Refills for t

## 2018-08-24 RX ORDER — LOSARTAN POTASSIUM 100 MG/1
TABLET ORAL
Qty: 90 TABLET | Refills: 1 | Status: SHIPPED | OUTPATIENT
Start: 2018-08-24 | End: 2019-01-30

## 2018-08-24 NOTE — TELEPHONE ENCOUNTER
Requesting LOSARTAN POTASSIUM 100 MG TAB  Will file in chart as: LOSARTAN 100 MG Oral Tab  TAKE 1 TABLET BY MOUTH EVERY DAY       Disp: 90 tablet Refills: 0    Class: Normal Start: 8/24/2018   Documented:4 years ago  Last refill: 5/21/2018  Hypertension Me

## 2018-09-05 RX ORDER — ALPRAZOLAM 0.5 MG/1
0.5 TABLET ORAL 2 TIMES DAILY PRN
Qty: 60 TABLET | Refills: 2 | Status: ON HOLD
Start: 2018-09-05 | End: 2018-12-02

## 2018-09-05 NOTE — TELEPHONE ENCOUNTER
Requesting Alprazolam  LOV: 7/2/18  RTC: 6 momths  Last Relevant Labs: n/a  Filled: 7/30/18 #60 with 0 refills    Future Appointments  Date Time Provider Alba Skinner   11/5/2018 3:40 PM NACHO Arias [facility]     Last filled 7/31/1

## 2018-09-12 ENCOUNTER — TELEPHONE (OUTPATIENT)
Dept: FAMILY MEDICINE CLINIC | Facility: CLINIC | Age: 53
End: 2018-09-12

## 2018-09-12 RX ORDER — VARENICLINE TARTRATE 1 MG/1
1 TABLET, FILM COATED ORAL 2 TIMES DAILY
Qty: 60 TABLET | Refills: 6 | Status: SHIPPED | OUTPATIENT
Start: 2018-09-12 | End: 2018-12-26

## 2018-09-17 DIAGNOSIS — E66.01 SEVERE OBESITY WITH BODY MASS INDEX (BMI) OF 35.0 TO 39.9 WITH SERIOUS COMORBIDITY (HCC): ICD-10-CM

## 2018-09-17 DIAGNOSIS — Z51.81 ENCOUNTER FOR THERAPEUTIC DRUG MONITORING: ICD-10-CM

## 2018-09-17 DIAGNOSIS — R73.03 PRE-DIABETES: ICD-10-CM

## 2018-09-17 DIAGNOSIS — I10 ESSENTIAL HYPERTENSION: ICD-10-CM

## 2018-09-17 NOTE — TELEPHONE ENCOUNTER
Requesting jardiance 10 mg  LOV: 8/1/18  RTC:  3 mth   Last Relevant Labs:   Filled: 7/02 #90 with 0 refills    Future Appointments   Date Time Provider Alba Skinner   11/5/2018  3:40 PM NAHCO Jeronimo EM[facility]EDDIE [facility]

## 2018-09-26 RX ORDER — CLONAZEPAM 0.5 MG/1
0.5 TABLET ORAL AS NEEDED
Qty: 30 TABLET | Refills: 2 | Status: ON HOLD
Start: 2018-09-26 | End: 2018-12-02

## 2018-09-26 NOTE — TELEPHONE ENCOUNTER
Requesting Clonazepam   LOV: 7/2/18  RTC: 6 wks  Last Labs: n/a  Filled: 4/11/18 #30 with 3 refills    Future Appointments   Date Time Provider Alba Skinner   11/5/2018  3:40 PM NACHO Huggins [facility]     [place]   Dispensed Written

## 2018-10-01 ENCOUNTER — TELEPHONE (OUTPATIENT)
Dept: FAMILY MEDICINE CLINIC | Facility: CLINIC | Age: 53
End: 2018-10-01

## 2018-10-11 RX ORDER — FEBUXOSTAT 40 MG/1
TABLET ORAL
Qty: 90 TABLET | Refills: 1 | Status: SHIPPED | OUTPATIENT
Start: 2018-10-11 | End: 2019-03-19

## 2018-10-11 NOTE — TELEPHONE ENCOUNTER
Requesting Uloric  LOV: 7/2/18  RTC: 6 months  Last Relevant Labs: 9/9/17 = 4.5 uric acid  Filled: 7/11/17 #90 with 3 refills    Future Appointments   Date Time Provider Alba Skinner   11/5/2018  3:40 PM NACHO Call [facility]I [facility]     N

## 2018-10-19 NOTE — TELEPHONE ENCOUNTER
Requesting Varenicline Tartrate (CHANTIX STARTING MONTH PENNY) 0.5 MG X 11 & 1   LOV: 7/2/18  RTC: 6 wks  Last Labs: n.a  Filled: 9/12/18 #1 pkg with 0 refills    Future Appointments   Date Time Provider Alba Skinner   11/5/2018  3:40 PM Shannan Sullivan

## 2018-10-30 RX ORDER — SILDENAFIL CITRATE 20 MG/1
TABLET ORAL
Qty: 10 TABLET | Refills: 5 | OUTPATIENT
Start: 2018-10-30

## 2018-10-30 NOTE — TELEPHONE ENCOUNTER
Requesting Sildenafil   LOV: 8/1/18  RTC: 3 months   Last Relevant Labs: n/a   Filled: 7/27/18 #10 with 5 refills    Future Appointments   Date Time Provider Alba Skinner   11/5/2018  3:40 PM NACHO Ocampo EM[facility]

## 2018-11-05 ENCOUNTER — OFFICE VISIT (OUTPATIENT)
Dept: INTERNAL MEDICINE CLINIC | Facility: CLINIC | Age: 53
End: 2018-11-05
Payer: COMMERCIAL

## 2018-11-05 VITALS
BODY MASS INDEX: 36.94 KG/M2 | WEIGHT: 258 LBS | RESPIRATION RATE: 16 BRPM | SYSTOLIC BLOOD PRESSURE: 120 MMHG | HEART RATE: 68 BPM | HEIGHT: 70 IN | DIASTOLIC BLOOD PRESSURE: 60 MMHG

## 2018-11-05 DIAGNOSIS — Z51.81 ENCOUNTER FOR THERAPEUTIC DRUG MONITORING: Primary | ICD-10-CM

## 2018-11-05 DIAGNOSIS — R73.03 PRE-DIABETES: ICD-10-CM

## 2018-11-05 DIAGNOSIS — I10 ESSENTIAL HYPERTENSION: ICD-10-CM

## 2018-11-05 DIAGNOSIS — E66.9 OBESITY (BMI 30-39.9): ICD-10-CM

## 2018-11-05 PROCEDURE — 99213 OFFICE O/P EST LOW 20 MIN: CPT | Performed by: NURSE PRACTITIONER

## 2018-11-05 RX ORDER — PHENTERMINE HYDROCHLORIDE 37.5 MG/1
37.5 TABLET ORAL
Qty: 30 TABLET | Refills: 2 | Status: SHIPPED | OUTPATIENT
Start: 2018-11-05 | End: 2019-02-04

## 2018-11-05 RX ORDER — SILDENAFIL CITRATE 20 MG/1
TABLET ORAL
Qty: 10 TABLET | Refills: 5 | OUTPATIENT
Start: 2018-11-05

## 2018-11-05 NOTE — PATIENT INSTRUCTIONS
Continue making lifestyle changes that focus on good nutrition, regular exercise and stress management. Medication Plan: Continue current medication regimen. Agreed upon goal/s before next office visit include: Recommend [email or web address] dietdoctor. com for nutrit 1. Burn more calories. Unlike fat, muscle beefs up your metabolism to help you burn about 70% more calories than fat can. 2. Improve appearance. When done properly, strength training can greatly improve your posture and help to prevent joint pain.   [address] [facility] Exercise offers many benefits including:   · Exercise increases your metabolism (the speed at which your body burns calories). · Regular exercise can increase the amount of muscle in your body. Muscle burns calories faster than fat.  The more muscle you ha

## 2018-11-05 NOTE — TELEPHONE ENCOUNTER
Requesting Sildenafil  LOV: 7/2/18  RTC: 6 weeks  Last Relevant Labs: n/a  Filled: 7/27/18 #10 with 5 refills    Future Appointments   Date Time Provider Alba Skinner   11/5/2018  3:40 PM NACHO Pedroza [facility]     Denied refill as

## 2018-11-05 NOTE — PROGRESS NOTES
Gaxioladavid Mondragon. is a 48year old male presents today for follow-up on medical weight loss program for the treatment of overweight, obesity, or morbid obesity with HTN, prediabetes, hyperlipidemia, JAVID.    S:  Current weight Wt Readings from Last 6 Enc CARDIO: RRR without murmur, normal S1 and S2 without clicks or gallops, no pedal edema.   GI: +BS, soft  NEURO/MS: motor and sensory grossly intact  PSYCH: pleasant, cooperative, normal mood and affect    ASSESSMENT AND PLAN:  Encounter for therapeutic drug Maybe Gilmer Courser wondered about muscle vs fat and why you need to build muscle to lose fat, look slim and keep the inches off. Well, look no further!  With the fat vs muscle diagram below you’ll see why healthy permanent weight loss requires you to build muscl 4. Prevent injury. Strength training can build stronger muscles and more limber, flexible joints, which play a crucial role in preventing injury. 5. Increase bone density. Weight bearing activities improve your bone density and reduce bone loss.  This help Make exercise fun  Exercise can be fun. Choose an activity you enjoy.  You may even get a friend to do it with you:  · Take a resistance-training or aerobics class  · Join a team sport  · Take a dance class  · Walk the dog  · Ride a bike  If you have health

## 2018-11-06 RX ORDER — SILDENAFIL CITRATE 20 MG/1
TABLET ORAL
Qty: 10 TABLET | Refills: 5 | OUTPATIENT
Start: 2018-11-06

## 2018-11-06 RX ORDER — SILDENAFIL CITRATE 20 MG/1
TABLET ORAL
Qty: 10 TABLET | Refills: 5 | Status: SHIPPED | OUTPATIENT
Start: 2018-11-06 | End: 2019-01-15

## 2018-11-06 NOTE — TELEPHONE ENCOUNTER
This was denied yesterday already. We sent a 6 month RX to the pharmacy requesting this in July. Would not be due yet.

## 2018-11-06 NOTE — TELEPHONE ENCOUNTER
Requesting Sildenafil   LOV: 7/2/18  RTC: 6 months  Last Relevant Labs: n/a   Filled: 7/27/18 #10 with 5 refills    Future Appointments   Date Time Provider Alba Skinner   2/4/2019  3:20 PM NACHO Gregorio EM[facility]     Per [place]  britta

## 2018-11-28 ENCOUNTER — HOSPITAL ENCOUNTER (EMERGENCY)
Facility: HOSPITAL | Age: 53
Discharge: NURSING, REHAB OR LONG-TERM CARE | End: 2018-11-29
Attending: STUDENT IN AN ORGANIZED HEALTH CARE EDUCATION/TRAINING PROGRAM
Payer: COMMERCIAL

## 2018-11-28 DIAGNOSIS — F10.229 ALCOHOL DEPENDENCE WITH INTOXICATION WITH COMPLICATION (HCC): ICD-10-CM

## 2018-11-28 DIAGNOSIS — F32.A DEPRESSION, UNSPECIFIED DEPRESSION TYPE: Primary | ICD-10-CM

## 2018-11-28 PROCEDURE — 99285 EMERGENCY DEPT VISIT HI MDM: CPT

## 2018-11-28 PROCEDURE — 80053 COMPREHEN METABOLIC PANEL: CPT | Performed by: STUDENT IN AN ORGANIZED HEALTH CARE EDUCATION/TRAINING PROGRAM

## 2018-11-28 PROCEDURE — 80307 DRUG TEST PRSMV CHEM ANLYZR: CPT | Performed by: STUDENT IN AN ORGANIZED HEALTH CARE EDUCATION/TRAINING PROGRAM

## 2018-11-28 PROCEDURE — 80320 DRUG SCREEN QUANTALCOHOLS: CPT | Performed by: STUDENT IN AN ORGANIZED HEALTH CARE EDUCATION/TRAINING PROGRAM

## 2018-11-28 PROCEDURE — 96361 HYDRATE IV INFUSION ADD-ON: CPT

## 2018-11-28 PROCEDURE — 85025 COMPLETE CBC W/AUTO DIFF WBC: CPT | Performed by: STUDENT IN AN ORGANIZED HEALTH CARE EDUCATION/TRAINING PROGRAM

## 2018-11-28 PROCEDURE — 96360 HYDRATION IV INFUSION INIT: CPT

## 2018-11-29 ENCOUNTER — TELEPHONE (OUTPATIENT)
Dept: FAMILY MEDICINE CLINIC | Facility: CLINIC | Age: 53
End: 2018-11-29

## 2018-11-29 VITALS
WEIGHT: 255 LBS | BODY MASS INDEX: 33.8 KG/M2 | OXYGEN SATURATION: 96 % | RESPIRATION RATE: 16 BRPM | HEIGHT: 73 IN | DIASTOLIC BLOOD PRESSURE: 102 MMHG | SYSTOLIC BLOOD PRESSURE: 169 MMHG | TEMPERATURE: 98 F | HEART RATE: 81 BPM

## 2018-11-29 PROBLEM — F10.239 ALCOHOL DEPENDENCE WITH WITHDRAWAL (HCC): Status: ACTIVE | Noted: 2018-11-29

## 2018-11-29 PROBLEM — F33.2 SEVERE RECURRENT MAJOR DEPRESSION WITHOUT PSYCHOTIC FEATURES (HCC): Status: ACTIVE | Noted: 2018-11-29

## 2018-11-29 RX ORDER — ACETAMINOPHEN 500 MG
1000 TABLET ORAL ONCE
Status: COMPLETED | OUTPATIENT
Start: 2018-11-29 | End: 2018-11-29

## 2018-11-29 NOTE — ED PROVIDER NOTES
Patient is not currently suicidal.  He has been observed here and is now sober.   His wife is here to pick him up to voluntarily be admitted at [facility] for alcohol detox

## 2018-11-29 NOTE — ED NOTES
Nurse to nurse given to [facility] at [facility], pt to be brought to [facility] via wife and private car when insurance authorization goes through.

## 2018-11-29 NOTE — ED PROVIDER NOTES
Patient Seen in: [facility] Emergency Department    History   Patient presents with:  Eval-P (psychiatric)    Stated Complaint: eval p    HPI    Patient is a 80-year-old male who presents emergency department reporting feeling depressed and suicidal hypertension    • Unspecified sleep apnea    • Visual impairment    • Wears glasses    • Weight loss        Past Surgical History:   Procedure Laterality Date   • BRONCHOSCOPY N/A 1/8/2015    Performed by Maulik Arguello MD at [facility] ENDOSCOPY   • COLONOSCOPY Musculoskeletal: Normal range of motion. Neurological: alert and oriented to person, place, and time, normal strength and normal reflexes, No cranial nerve deficit or sensory deficit. GCS eye subscore is 4. GCS verbal subscore is 5.  GCS motor subscore significantly intoxicated. He was observed closely until he was medically cleared and able to participate fully in [facility] crisis screener assessments. Psychiatric consultation was also obtained.     Psychiatrist recommended voluntary inpatient admiss

## 2018-11-29 NOTE — PROGRESS NOTES
11/29/18 0941 11/29/18 1005   Chandana   BP (!) 166/91 --    Pulse 91 --    Nausea and Vomiting --  0   Tactile Disturbances --  0   Tremor --  1   Auditory Disturbances --  0   Paroxysmal Sweats --  1   Visual Disturbances --  0   Anxiety --  1   Headac
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
## 2018-11-29 NOTE — ED INITIAL ASSESSMENT (HPI)
Patient is here for depression and had 1/2 pint of whiskey and 1 pint of vodka in the last hour and half in addition to drinking during the day. Patient's wife states that the patient has been making suicidal threats to her and his coworkers.   Patient sta

## 2018-11-29 NOTE — ED NOTES
Patient OK to transfer to [facility] at this time per [facility] RN. Awaiting patient's wife to drive him over.

## 2018-11-29 NOTE — ED NOTES
[facility]. Needs insurance pre authorization that has to be done after 0730. Mariah Ramsay has bed per [place]. Patient is going for inpatient help voluntarily. Wife will transport when Mariah Ramsay has insurance clearance.

## 2018-11-30 PROBLEM — E78.2 MIXED HYPERLIPIDEMIA: Status: ACTIVE | Noted: 2018-11-30

## 2018-11-30 PROBLEM — R79.89 ABNORMAL LFTS: Status: ACTIVE | Noted: 2018-11-30

## 2018-12-14 RX ORDER — METOPROLOL SUCCINATE 50 MG/1
TABLET, EXTENDED RELEASE ORAL
Qty: 90 TABLET | Refills: 0 | Status: SHIPPED | OUTPATIENT
Start: 2018-12-14 | End: 2018-12-26

## 2018-12-14 NOTE — TELEPHONE ENCOUNTER
Patient returned call and states that he will have to call back to schedule a follow up appt as he does not have his schedule at the moment

## 2018-12-14 NOTE — TELEPHONE ENCOUNTER
Requested Medications      Name from pharmacy: METOPROLOL SUCC ER 50 MG TAB         Will file in chart as: METOPROLOL SUCCINATE ER 50 MG Oral Tablet 24 Hr    Sig: TAKE 1 TABLET BY MOUTH DAILY    Disp:  90 tablet    Refills:  1    Start: 12/14/2018    Class

## 2018-12-15 DIAGNOSIS — R73.03 PRE-DIABETES: ICD-10-CM

## 2018-12-15 DIAGNOSIS — E66.01 SEVERE OBESITY WITH BODY MASS INDEX (BMI) OF 35.0 TO 39.9 WITH SERIOUS COMORBIDITY (HCC): ICD-10-CM

## 2018-12-15 DIAGNOSIS — I10 ESSENTIAL HYPERTENSION: ICD-10-CM

## 2018-12-15 DIAGNOSIS — Z51.81 ENCOUNTER FOR THERAPEUTIC DRUG MONITORING: ICD-10-CM

## 2018-12-17 RX ORDER — EMPAGLIFLOZIN 10 MG/1
TABLET, FILM COATED ORAL
Qty: 90 TABLET | Refills: 0 | Status: SHIPPED | OUTPATIENT
Start: 2018-12-17 | End: 2019-04-13

## 2018-12-17 NOTE — TELEPHONE ENCOUNTER
Requesting   Requested Prescriptions     Pending Prescriptions Disp Refills   • JARDIANCE 10 MG Oral Tab [Pharmacy Med Name: Kuldeep Link 10 MG TABLET] 90 tablet 0     Sig: TAKE 1 TABLET BY MOUTH EVERY MORNING     LOV: 11/5/18  RTC: 3 months  Filled: 9/18/18

## 2018-12-26 ENCOUNTER — OFFICE VISIT (OUTPATIENT)
Dept: FAMILY MEDICINE CLINIC | Facility: CLINIC | Age: 53
End: 2018-12-26
Payer: COMMERCIAL

## 2018-12-26 VITALS
DIASTOLIC BLOOD PRESSURE: 94 MMHG | SYSTOLIC BLOOD PRESSURE: 160 MMHG | HEART RATE: 74 BPM | RESPIRATION RATE: 16 BRPM | TEMPERATURE: 98 F | HEIGHT: 70 IN | BODY MASS INDEX: 36.22 KG/M2 | WEIGHT: 253 LBS

## 2018-12-26 DIAGNOSIS — E78.00 HIGH CHOLESTEROL: ICD-10-CM

## 2018-12-26 DIAGNOSIS — I10 ESSENTIAL HYPERTENSION: Primary | ICD-10-CM

## 2018-12-26 PROCEDURE — 99214 OFFICE O/P EST MOD 30 MIN: CPT | Performed by: FAMILY MEDICINE

## 2018-12-26 RX ORDER — METOPROLOL SUCCINATE 100 MG/1
100 TABLET, EXTENDED RELEASE ORAL DAILY
Qty: 90 TABLET | Refills: 3 | Status: SHIPPED | OUTPATIENT
Start: 2018-12-26 | End: 2019-11-11

## 2018-12-26 NOTE — PROGRESS NOTES
HPI:    Patient ID: Khalida Mondragon. is a 48year old male. HPI  Mr. Avni Belle is a pleasant 49-year-old gentleman with history of hypertension, hyperlipidemia, obesity, depression, anxiety, alcohol dependence here for his follow-up appointment.   He ha tablet (50 mg total) by mouth daily. Disp: 30 tablet Rfl: 0   Thiamine HCl 100 MG Oral Tab Take 1 tablet (100 mg total) by mouth daily.  Disp: 30 tablet Rfl: 0   Sildenafil Citrate 20 MG Oral Tab TAKE 1 TABLET BY MOUTH DAILY FOR ERECTIONS Disp: 10 tablet Rf no mass. There is no tenderness. Musculoskeletal: He exhibits no edema. Lymphadenopathy:     He has no cervical adenopathy. Neurological: He is alert. Psychiatric: He has a normal mood and affect. His behavior is normal.   Vitals reviewed.

## 2018-12-26 NOTE — PATIENT INSTRUCTIONS
Thank you for choosing Ivonne Kim MD at [place] [address]  To Do: Oanh Horne.  1. Please take meds as directed. 2. High Blood pressure  What Is a Normal Blood Pressure?   The Dedi[employer]erve on Prevention, Detection, Evaluation, a that supplies blood to the abdomen, pelvis, and legs   Heart failure   Poor blood supply to the legs  Erectile Dysfunction  Problems with your vision    Overview  \"Blood pressure\" is the force of blood pushing against the walls of the arteries as the hea typical American diet. Limit sodium to no more than 2,300 mg a day (eating only 1,500 mg a day is an even better goal). Reduce saturated fat to no more than 6% of daily calories and total fat to 27% of daily calories.  Low-fat dairy products appear to be blend of vegetables served over brown rice or whole-wheat noodles can serve as the main dish for a meal.   • Fresh or frozen vegetables are both good choices.  When buying frozen and canned vegetables, choose those labeled as low sodium or without added sal can reduce or prevent the symptoms of lactose intolerance. • Go easy on regular and even fat-free cheeses because they are typically high in sodium.    Lean meat, poultry and fish (6 or fewer servings a day)  Meat can be a rich source of protein, B vitami has been shown to have some health benefits. Fats and oils (2 to 3 servings a day)  Fat helps your body absorb essential vitamins and helps your body's immune system. But too much fat increases your risk of heart disease, diabetes and obesity.  The DASH d value but can pack on calories. DASH diet: Alcohol and caffeine  Drinking too much alcohol can increase blood pressure. The DASH diet recommends that men limit alcohol to two or fewer drinks a day and women one or less.    The DASH diet doesn't address ca beverages. If things seem too bland, gradually introduce low-sodium foods and cut back on table salt until you reach your sodium goal. That'll give your palate time to adjust. It can take several weeks for your taste buds to get used to less salty foods. office with questions, and to notify us and stop treatment if problems arise, but know that our intention is that the benefits outweigh those potential risks and we strive to make you healthier and to improve your quality of life.     Referrals, and [facility]

## 2019-01-03 ENCOUNTER — TELEPHONE (OUTPATIENT)
Dept: FAMILY MEDICINE CLINIC | Facility: CLINIC | Age: 54
End: 2019-01-03

## 2019-01-03 NOTE — TELEPHONE ENCOUNTER
Pt is wanting to transfer Authorizing Provider to his PCP for the following:    FLUoxetine HCl 20 MG Oral Cap (1 left)  Naltrexone HCl 50 MG Oral Tab (3 days worth left)    HydrOXYzine HCl 50 MG Oral Tab (was just recently filled)  TraZODone HCl 100 MG Ora

## 2019-01-03 NOTE — TELEPHONE ENCOUNTER
Patient wants you to take over writing for meds that are usually written by Dr Ashlee Valadez. Will you do this?

## 2019-01-04 NOTE — TELEPHONE ENCOUNTER
Patient calling to let Dr. Cullen Mccauley that he needs refills on Fluoxetine 40 mg and Naltrexone 50 mg. [facility] [place]. Patient does not have any medication left of one of these.

## 2019-01-04 NOTE — TELEPHONE ENCOUNTER
Left message on pt's vm letting him know  is ok with refilling medication below. He was asked to CB to let us know what medications he needs and proper dosages.

## 2019-01-07 RX ORDER — FLUOXETINE HYDROCHLORIDE 40 MG/1
40 CAPSULE ORAL DAILY
Qty: 30 CAPSULE | Refills: 0 | OUTPATIENT
Start: 2019-01-07

## 2019-01-07 RX ORDER — NALTREXONE HYDROCHLORIDE 50 MG/1
50 TABLET, FILM COATED ORAL DAILY
Qty: 30 TABLET | Refills: 0 | OUTPATIENT
Start: 2019-01-07

## 2019-01-07 RX ORDER — NALTREXONE HYDROCHLORIDE 50 MG/1
50 TABLET, FILM COATED ORAL DAILY
Qty: 30 TABLET | Refills: 1 | Status: SHIPPED | OUTPATIENT
Start: 2019-01-07 | End: 2019-03-28

## 2019-01-07 NOTE — TELEPHONE ENCOUNTER
Requesting Naltrexone 50 mg  LOV: 12/26/18  RTC: 6 mos  Last Labs: 11/30/18  Filled: 12/6/18#30 with 0 refills    Future Appointments   Date Time Provider Alba Skinner   2/4/2019  3:20 PM TEO Huggins EMGGERARDOI [facility]   6/26/2019  4:00 PM

## 2019-01-07 NOTE — TELEPHONE ENCOUNTER
Naltrexone HCl 50 MG Oral Tab     Pt's wife is calling trying to get the above medication filled for pt. He is completely out and states that Dr. Allen Borjas from [facility] says pt will need to get a refill from his PCP now for this script.      Pt's wife is s

## 2019-01-15 ENCOUNTER — TELEPHONE (OUTPATIENT)
Dept: FAMILY MEDICINE CLINIC | Facility: CLINIC | Age: 54
End: 2019-01-15

## 2019-01-15 RX ORDER — SILDENAFIL CITRATE 20 MG/1
TABLET ORAL
Qty: 10 TABLET | Refills: 5 | Status: SHIPPED | OUTPATIENT
Start: 2019-01-15 | End: 2019-03-19

## 2019-01-15 RX ORDER — TESTOSTERONE 30 MG/1.5ML
SOLUTION TOPICAL
Qty: 180 ML | Refills: 1 | Status: SHIPPED
Start: 2019-01-15 | End: 2019-01-17

## 2019-01-15 NOTE — TELEPHONE ENCOUNTER
Patient called - he now needs to use [facility] for this medication only. He called [facility] and set up the account.  Please resend the RX for Testosterone (AXIRON) 30 MG/ACT Transdermal Solution  To [facility] - his account# is [[email or web address]]

## 2019-01-15 NOTE — TELEPHONE ENCOUNTER
Requested Medications      Name from pharmacy: SILDENAFIL 20 MG TABLET         Will file in chart as: SILDENAFIL CITRATE 20 MG Oral Tab    Sig: TAKE 1 TABLET BY MOUTH DAILY FOR ERECTIONS    Disp:  10 tablet    Refills:  5    Start: 1/15/2019    Class: Norm

## 2019-01-15 NOTE — TELEPHONE ENCOUNTER
Requesting testosterone   LOV: 12/26/18  RTC: 6 months  Last Relevant Labs: 3/10/18  Filled: 6/25/18 #180ml with 1 refills    Future Appointments   Date Time Provider Alba Skinner   2/4/2019  3:20 PM TEO Badillo EMGEDDIE EM[facility]   6/26/2

## 2019-01-17 NOTE — TELEPHONE ENCOUNTER
Pt will need the prescription for testosterone topical solution cancelled at the [facility] and sent to the [facility] in [place]. Patient states that it may need prior authorization , but the local pharmacy can fill it if the script is for 90 days.

## 2019-01-18 RX ORDER — TESTOSTERONE 30 MG/1.5ML
SOLUTION TOPICAL
Qty: 180 ML | Refills: 1 | Status: SHIPPED
Start: 2019-01-18 | End: 2019-06-01

## 2019-01-19 NOTE — TELEPHONE ENCOUNTER
Problem: Patient Centered Care  Goal: Patient preferences are identified and integrated in the patient's plan of care  Interventions:  - What would you like us to know as we care for you?  Pt home with family.  - Provide timely, complete, and accurate infor This is a duplicate encounter. Maintain adequate hydration with IV or PO as ordered and tolerated  - Nasogastric tube to low intermittent suction as ordered  - Evaluate effectiveness of ordered antiemetic medications  - Provide nonpharmacologic comfort measures as appropriate  - Advance

## 2019-01-25 ENCOUNTER — TELEPHONE (OUTPATIENT)
Dept: FAMILY MEDICINE CLINIC | Facility: CLINIC | Age: 54
End: 2019-01-25

## 2019-01-25 NOTE — TELEPHONE ENCOUNTER
Dr. Jana Cooley- Rec'd incoming fax requesting 90 day supply for   Testosterone (AXIRON) 30 MG/ACT Transdermal Solution 180 mL 1 1/18/2019     Sig: APPLY 2 PUMP TOPICALLY DAILY    Class:  [facility]ax      [facility] [address] [facility] [phone]

## 2019-01-25 NOTE — TELEPHONE ENCOUNTER
Requesting: Testosterone ( AXIRON) 30 MG/ACT MG Transdermal Solution   LOV: 12/26/18  RTC: 6 months  Last Relevant Labs: 3/10/18  Filled: 1/18/19 #180 mL with 0 refills    Future Appointments   Date Time Provider Alba Skinner   2/4/2019  3:20 PM Laurent Liz

## 2019-01-29 ENCOUNTER — TELEPHONE (OUTPATIENT)
Dept: FAMILY MEDICINE CLINIC | Facility: CLINIC | Age: 54
End: 2019-01-29

## 2019-01-29 NOTE — TELEPHONE ENCOUNTER
PA has been submitted through [facility] for testosterone solution.  Waiting on denial/approval

## 2019-01-30 NOTE — TELEPHONE ENCOUNTER
Requesting NOVOFINE needles  LOV: 11/5/18  RTC: 3 months  Last Relevant Labs: n/a  Filled: 8/21/18 #1 box with 1 refills    Future Appointments   Date Time Provider Alba Skinner   2/4/2019  3:20 PM TEO Huggins EMGGERARDOI [facility]   6/26/201

## 2019-01-31 RX ORDER — LOSARTAN POTASSIUM 100 MG/1
TABLET ORAL
Qty: 90 TABLET | Refills: 1 | Status: SHIPPED | OUTPATIENT
Start: 2019-01-31 | End: 2019-07-01

## 2019-02-04 ENCOUNTER — OFFICE VISIT (OUTPATIENT)
Dept: INTERNAL MEDICINE CLINIC | Facility: CLINIC | Age: 54
End: 2019-02-04
Payer: COMMERCIAL

## 2019-02-04 VITALS
HEART RATE: 72 BPM | HEIGHT: 70 IN | SYSTOLIC BLOOD PRESSURE: 100 MMHG | WEIGHT: 238 LBS | RESPIRATION RATE: 14 BRPM | DIASTOLIC BLOOD PRESSURE: 70 MMHG | BODY MASS INDEX: 34.07 KG/M2

## 2019-02-04 DIAGNOSIS — E66.9 OBESITY (BMI 30-39.9): ICD-10-CM

## 2019-02-04 DIAGNOSIS — K59.03 DRUG INDUCED CONSTIPATION: ICD-10-CM

## 2019-02-04 DIAGNOSIS — Z51.81 ENCOUNTER FOR THERAPEUTIC DRUG MONITORING: Primary | ICD-10-CM

## 2019-02-04 PROCEDURE — 99213 OFFICE O/P EST LOW 20 MIN: CPT | Performed by: NURSE PRACTITIONER

## 2019-02-04 RX ORDER — PHENTERMINE HYDROCHLORIDE 37.5 MG/1
37.5 TABLET ORAL
Qty: 30 TABLET | Refills: 2 | Status: SHIPPED | OUTPATIENT
Start: 2019-02-04 | End: 2019-05-06

## 2019-02-04 NOTE — PATIENT INSTRUCTIONS
Continue making lifestyle changes that focus on good nutrition, regular exercise and stress management. Medication Plan: Continue current medication regimen. Add Linzess daily for constipation. Agreed upon goal/s before next office visit include:  Mon cycle and other important brain functions   Medications – Many medications are associated with weight gain such as insulin, oral medications for diabetes, antidepressants and more   Metabolic adaptation – Changes in our metabolism and physiology can affect calming tea in the evenings. Monitor food intake – Be aware of what’s going into your body and how much. Are you controlling your portions? Eating foods that are less-processed and rich with nutrients?    Establish healthy habits – Simple lifestyle change

## 2019-02-04 NOTE — PROGRESS NOTES
Dann Peters. is a 48year old male presents today for follow-up on medical weight loss program for the treatment of overweight, obesity, or morbid obesity with HTN, prediabetes, hyperlipidemia, JAVID.    S:  Current weight Wt Readings from Last 6 Enc labored  CARDIO: RRR without murmur, normal S1 and S2 without clicks or gallops, no pedal edema.   GI: +BS, soft, non tender  NEURO/MS: motor and sensory grossly intact  PSYCH: pleasant, cooperative, normal mood and affect    ASSESSMENT AND PLAN:  Encounter Weight and Health    September 14, 2016 • Posted in Farooq Kerr, Exercise, Motivation, Nutrition, Weight-loss Options • By Your [place] [employer] Campaign    Within our culture, we often have a way of thinking about our body size and weight that is culturally driven weight affects our health, not just our appearance – Although weight is a concern for many people who care deeply about their visual appearance, we must also bring health into the bigger picture.  Having excess weight can contribute to many different health the full video online as Dr. Fara Hillman speaks to attendees at the [facility] 2015 Your Weight Matters National Convention in [place], [place]. Cut and paste or click the link below:  [email or web address] be/[email or web address]            Medication use and SEs reviewed

## 2019-02-14 DIAGNOSIS — E66.9 OBESITY (BMI 30-39.9): ICD-10-CM

## 2019-02-14 DIAGNOSIS — Z51.81 ENCOUNTER FOR THERAPEUTIC DRUG MONITORING: ICD-10-CM

## 2019-02-15 RX ORDER — PHENTERMINE HYDROCHLORIDE 37.5 MG/1
TABLET ORAL
Qty: 30 TABLET | Refills: 2 | OUTPATIENT
Start: 2019-02-15

## 2019-02-15 NOTE — TELEPHONE ENCOUNTER
Requesting Phentermine 37.5  LOV: 2/4/19  RTC: 3 months  Last Relevant Labs: n/a  Filled: 2/4/19 #30 with 2 refills    Future Appointments   Date Time Provider Alba Skinner   5/6/2019  3:20 PM TEO RichardsI EMG [facility]   6/26/2019  4:

## 2019-02-20 ENCOUNTER — TELEPHONE (OUTPATIENT)
Dept: FAMILY MEDICINE CLINIC | Facility: CLINIC | Age: 54
End: 2019-02-20

## 2019-02-21 RX ORDER — TRAZODONE HYDROCHLORIDE 100 MG/1
TABLET ORAL
Qty: 30 TABLET | Refills: 0 | Status: SHIPPED | OUTPATIENT
Start: 2019-02-21 | End: 2019-03-19

## 2019-02-28 ENCOUNTER — TELEPHONE (OUTPATIENT)
Dept: FAMILY MEDICINE CLINIC | Facility: CLINIC | Age: 54
End: 2019-02-28

## 2019-02-28 RX ORDER — FLUOXETINE HYDROCHLORIDE 40 MG/1
40 CAPSULE ORAL
Qty: 90 CAPSULE | Refills: 1 | Status: SHIPPED | OUTPATIENT
Start: 2019-02-28 | End: 2019-07-19

## 2019-02-28 RX ORDER — HYDROXYZINE 50 MG/1
TABLET, FILM COATED ORAL
Qty: 180 TABLET | Refills: 1 | Status: SHIPPED | OUTPATIENT
Start: 2019-02-28 | End: 2019-05-06

## 2019-02-28 NOTE — TELEPHONE ENCOUNTER
Last refill on fluoxetine 1/29/19- #30 no refills  Last refill on hydroxyzine 1/29/19- #60 no refills   LOV 12/26/18 RTC 6 months    Both meds pended and routed to Dr. [place]   Date Time Provider Alba Skinner   5/6/2019  3:20 PM

## 2019-02-28 NOTE — TELEPHONE ENCOUNTER
Pt states Dr. Jennifer Givens is taking over his FLUOXETINE HCL 40 MG Oral Cap, HYDROXYZINE HCL 50 MG Oral Tab pt needs his meds as they are running out, he is asking if one of the other docs can do it since Jennifer Givens is out of the office today and tomorrow

## 2019-03-19 RX ORDER — FEBUXOSTAT 40 MG/1
TABLET ORAL
Qty: 90 TABLET | Refills: 1 | Status: SHIPPED | OUTPATIENT
Start: 2019-03-19 | End: 2019-09-10

## 2019-03-19 RX ORDER — TRAZODONE HYDROCHLORIDE 100 MG/1
TABLET ORAL
Qty: 90 TABLET | Refills: 1 | Status: SHIPPED | OUTPATIENT
Start: 2019-03-19 | End: 2019-05-06

## 2019-03-19 RX ORDER — SILDENAFIL CITRATE 20 MG/1
TABLET ORAL
Qty: 8 TABLET | Refills: 5 | Status: SHIPPED | OUTPATIENT
Start: 2019-03-19 | End: 2019-08-14

## 2019-03-19 NOTE — TELEPHONE ENCOUNTER
Requesting trazodone, uloric, sildenafil  LOV: 12/26/18  RTC: 6 months  Last Relevant Labs: 11/28/18      Future Appointments   Date Time Provider Alba Skinner   5/6/2019  3:20 PM TEO Hoang EM[facility] [address]   6/26/2019  4:00 PM Sonia

## 2019-03-28 ENCOUNTER — TELEPHONE (OUTPATIENT)
Dept: FAMILY MEDICINE CLINIC | Facility: CLINIC | Age: 54
End: 2019-03-28

## 2019-03-28 RX ORDER — NALTREXONE HYDROCHLORIDE 50 MG/1
50 TABLET, FILM COATED ORAL DAILY
Qty: 90 TABLET | Refills: 1 | Status: SHIPPED | OUTPATIENT
Start: 2019-03-28 | End: 2019-05-06

## 2019-04-13 DIAGNOSIS — I10 ESSENTIAL HYPERTENSION: ICD-10-CM

## 2019-04-13 DIAGNOSIS — R73.03 PRE-DIABETES: ICD-10-CM

## 2019-04-13 DIAGNOSIS — Z51.81 ENCOUNTER FOR THERAPEUTIC DRUG MONITORING: ICD-10-CM

## 2019-04-13 DIAGNOSIS — E66.01 SEVERE OBESITY WITH BODY MASS INDEX (BMI) OF 35.0 TO 39.9 WITH SERIOUS COMORBIDITY (HCC): ICD-10-CM

## 2019-04-15 NOTE — TELEPHONE ENCOUNTER
Requesting Jardiance  LOV: 2/4/19  RTC: 3 months  Last Relevant Labs: 11/30/18  Filled: 12/17/18 #90 with 0 refills    Future Appointments   Date Time Provider Alba Skinner   5/6/2019  3:20 PM TEO Walker [facility]   6/26/2019  4:

## 2019-04-17 RX ORDER — EMPAGLIFLOZIN 10 MG/1
TABLET, FILM COATED ORAL
Qty: 90 TABLET | Refills: 0 | Status: SHIPPED | OUTPATIENT
Start: 2019-04-17 | End: 2019-07-01

## 2019-05-06 ENCOUNTER — OFFICE VISIT (OUTPATIENT)
Dept: INTERNAL MEDICINE CLINIC | Facility: CLINIC | Age: 54
End: 2019-05-06
Payer: COMMERCIAL

## 2019-05-06 VITALS
HEART RATE: 70 BPM | BODY MASS INDEX: 34.07 KG/M2 | DIASTOLIC BLOOD PRESSURE: 60 MMHG | SYSTOLIC BLOOD PRESSURE: 120 MMHG | HEIGHT: 70 IN | WEIGHT: 238 LBS | RESPIRATION RATE: 14 BRPM

## 2019-05-06 DIAGNOSIS — Z51.81 ENCOUNTER FOR THERAPEUTIC DRUG MONITORING: Primary | ICD-10-CM

## 2019-05-06 DIAGNOSIS — K59.03 DRUG INDUCED CONSTIPATION: ICD-10-CM

## 2019-05-06 DIAGNOSIS — E66.01 SEVERE OBESITY WITH BODY MASS INDEX (BMI) OF 35.0 TO 39.9 WITH SERIOUS COMORBIDITY (HCC): ICD-10-CM

## 2019-05-06 DIAGNOSIS — I10 ESSENTIAL HYPERTENSION: ICD-10-CM

## 2019-05-06 DIAGNOSIS — R73.03 PRE-DIABETES: ICD-10-CM

## 2019-05-06 PROCEDURE — 99214 OFFICE O/P EST MOD 30 MIN: CPT | Performed by: NURSE PRACTITIONER

## 2019-05-06 RX ORDER — TOPIRAMATE 100 MG/1
100 TABLET, FILM COATED ORAL 2 TIMES DAILY
Qty: 180 TABLET | Refills: 1 | Status: SHIPPED | OUTPATIENT
Start: 2019-05-06 | End: 2019-05-06 | Stop reason: DRUGHIGH

## 2019-05-06 RX ORDER — PHENTERMINE HYDROCHLORIDE 37.5 MG/1
37.5 TABLET ORAL
Qty: 30 TABLET | Refills: 2 | Status: SHIPPED | OUTPATIENT
Start: 2019-05-06 | End: 2019-10-23

## 2019-05-06 RX ORDER — TOPIRAMATE 25 MG/1
25 TABLET ORAL 2 TIMES DAILY
Qty: 60 TABLET | Refills: 1 | Status: SHIPPED | OUTPATIENT
Start: 2019-05-06 | End: 2019-06-23

## 2019-05-06 RX ORDER — METFORMIN HYDROCHLORIDE 750 MG/1
1500 TABLET, EXTENDED RELEASE ORAL
Qty: 180 TABLET | Refills: 1 | Status: SHIPPED | OUTPATIENT
Start: 2019-05-06 | End: 2019-10-23

## 2019-05-06 NOTE — PATIENT INSTRUCTIONS
Continue making lifestyle changes that focus on good nutrition, regular exercise and stress management. Medication Plan: Continue current medication regimen, resume Metformin ER at 1 tab daily with meal for 7 days, then increase to 2 tabs as prescribed. yourself with something you might really want or need (not food-related) to distract your mind. Connect with Others on Similar Paths  The structured routine of our daily lives can be quite isolating.  Add some spark to your day or week by getting social an

## 2019-05-06 NOTE — PROGRESS NOTES
Ronak Debbie. is a 48year old male presents today for follow-up on medical weight loss program for the treatment of overweight, obesity, or morbid obesity with HTN, prediabetes, hyperlipidemia, JAVID.    S:  Current weight Wt Readings from Last 6 Enc 238 lb   BMI 34.15 kg/m²    GENERAL: well developed, well nourished, in no apparent distress, obese  EYES: conjunctiva pink, sclera non icteric  LUNGS: CTA in all fields, breathing non labored  CARDIO: RRR without murmur, normal S1 and S2 without clicks or healthy. Recommend counseling- pt defers d/t time constraints. Recommend CALM paula, [employer] podcast. See patient instructions below for additional plans and patient counseling.       Patient Instructions   Continue making lifestyle changes that focus on go “cheat food” you've been craving to break up the monotony of structured meals. Give Yourself Something You Need  All work and no play isn't very motivating.  You've put the pedal to the medal, so reward yourself with something you might really want or need

## 2019-06-03 RX ORDER — TESTOSTERONE 30 MG/1.5ML
SOLUTION TOPICAL
Qty: 180 ML | Refills: 1 | Status: SHIPPED
Start: 2019-06-03 | End: 2019-08-26

## 2019-06-03 NOTE — TELEPHONE ENCOUNTER
RX for Testosterone approved per Dr. Ortega Im faxed to [facility] [phone] with confirmation received. Task completed.

## 2019-06-03 NOTE — TELEPHONE ENCOUNTER
Requesting TESTOSTERONE 30 MG/ACT Transdermal Solution  LOV: 12/26/18  RTC: 6 months  Last Relevant Labs: 3/10/18  Filled: 1/18/19 # 180mlwith 1 refills    Future Appointments   Date Time Provider Alba Skinner   6/26/2019  4:00 PM Shahana Whyte MD

## 2019-06-23 DIAGNOSIS — E66.01 SEVERE OBESITY WITH BODY MASS INDEX (BMI) OF 35.0 TO 39.9 WITH SERIOUS COMORBIDITY (HCC): ICD-10-CM

## 2019-06-23 DIAGNOSIS — Z51.81 ENCOUNTER FOR THERAPEUTIC DRUG MONITORING: ICD-10-CM

## 2019-06-24 RX ORDER — TOPIRAMATE 25 MG/1
25 TABLET ORAL 2 TIMES DAILY
Qty: 60 TABLET | Refills: 1 | Status: SHIPPED | OUTPATIENT
Start: 2019-06-24 | End: 2019-06-24

## 2019-06-24 RX ORDER — TOPIRAMATE 25 MG/1
25 TABLET ORAL 2 TIMES DAILY
Qty: 180 TABLET | Refills: 0 | Status: SHIPPED | OUTPATIENT
Start: 2019-06-24 | End: 2019-07-01

## 2019-06-24 NOTE — TELEPHONE ENCOUNTER
Requesting   Requested Prescriptions     Pending Prescriptions Disp Refills   • TOPIRAMATE 25 MG Oral Tab [Pharmacy Med Name: TOPIRAMATE 25 MG TABLET] 60 tablet 1     Sig: TAKE 1 TABLET BY MOUTH TWICE DAILY. REFER TO DISCHARGE INSTRUCTIONS FOR DOSING.

## 2019-07-01 ENCOUNTER — OFFICE VISIT (OUTPATIENT)
Dept: INTERNAL MEDICINE CLINIC | Facility: CLINIC | Age: 54
End: 2019-07-01
Payer: COMMERCIAL

## 2019-07-01 VITALS
DIASTOLIC BLOOD PRESSURE: 70 MMHG | WEIGHT: 241 LBS | HEART RATE: 70 BPM | RESPIRATION RATE: 14 BRPM | SYSTOLIC BLOOD PRESSURE: 110 MMHG | HEIGHT: 70 IN | BODY MASS INDEX: 34.5 KG/M2

## 2019-07-01 DIAGNOSIS — Z51.81 ENCOUNTER FOR THERAPEUTIC DRUG MONITORING: Primary | ICD-10-CM

## 2019-07-01 DIAGNOSIS — R73.03 PRE-DIABETES: ICD-10-CM

## 2019-07-01 DIAGNOSIS — E66.01 SEVERE OBESITY WITH BODY MASS INDEX (BMI) OF 35.0 TO 39.9 WITH SERIOUS COMORBIDITY (HCC): ICD-10-CM

## 2019-07-01 DIAGNOSIS — I10 ESSENTIAL HYPERTENSION: ICD-10-CM

## 2019-07-01 PROCEDURE — 99213 OFFICE O/P EST LOW 20 MIN: CPT | Performed by: NURSE PRACTITIONER

## 2019-07-01 RX ORDER — TOPIRAMATE 100 MG/1
100 TABLET, FILM COATED ORAL 2 TIMES DAILY
COMMUNITY
End: 2019-10-30

## 2019-07-01 NOTE — PROGRESS NOTES
Lizzette Lagunas. is a 48year old male presents today for follow-up on medical weight loss program for the treatment of overweight, obesity, or morbid obesity with HTN, prediabetes, hyperlipidemia, JAVID.    S:  Current weight Wt Readings from Last 6 Enc normal S1 and S2 without clicks or gallops, no pedal edema.   GI: +BS, soft, non tender  NEURO/MS: motor and sensory grossly intact  PSYCH: pleasant, cooperative, normal mood    ASSESSMENT AND PLAN:  Encounter for therapeutic drug monitoring  (primary encou else without taking good care of yourself as well. It’s not selfish. It’s essential. Take a break. Eat right. Get out and exercise. Most of all, accept that you can’t do everything yourself.     Give yourself a break  All of the things you do are not equall mangement. Patient verbalizes understanding.

## 2019-07-01 NOTE — PATIENT INSTRUCTIONS
Continue making lifestyle changes that focus on good nutrition, regular exercise and stress management. Medication Plan: Continue current medication regimen.     Agreed upon goal/s before next office visit include: When you are ready to get back on track weight  · Feeling restless or irritable  · Feeling tired, weak, or low in energy  · Having trouble focusing, remembering, or making decisions  · Feeling angry or agitated can be a sign as well. This may be the only sign more common in men.    Date Last Revi

## 2019-07-02 RX ORDER — LOSARTAN POTASSIUM 100 MG/1
TABLET ORAL
Qty: 90 TABLET | Refills: 1 | Status: SHIPPED | OUTPATIENT
Start: 2019-07-02 | End: 2019-11-11

## 2019-07-02 NOTE — TELEPHONE ENCOUNTER
Hypertension Medications Protocol Failed7/1 4:58 PM   Appointment in past 6 or next 3 months    CMP or BMP in past 12 months    Last serum creatinine< 2.0     Requesting losartan 100mg  LOV: 12/26/18  RTC:   Last Relevant Labs: 11/28/18  Filled: 1/31/19  #

## 2019-07-16 NOTE — TELEPHONE ENCOUNTER
I did say yes to Ada Vazquez last time so it is ok
Patient is no longer seeing dr. Ivett Hall regarding meds  Spoke to pt's wife, she states pt did talk to dr. Jarold Goldmann if he was ok to continue to prescribe medication and was told yes.    Pt is a recovering alcoholic and pt's dad just got diagnosed with stage 4
Pt's wife  would like to speak to a nurse in regards to her husbands prescription being denied , please advise     Pt calling wanting an update on this message,please call
no

## 2019-07-19 RX ORDER — FLUOXETINE HYDROCHLORIDE 40 MG/1
CAPSULE ORAL
Qty: 90 CAPSULE | Refills: 1 | Status: SHIPPED | OUTPATIENT
Start: 2019-07-19 | End: 2020-08-12

## 2019-07-19 NOTE — TELEPHONE ENCOUNTER
Requested Prescriptions     Pending Prescriptions Disp Refills   • FLUOXETINE HCL 40 MG Oral Cap [Pharmacy Med Name: FLUOXETINE HCL 40 MG CAPSULE] 90 capsule 1     Sig: TAKE 1 CAPSULE BY MOUTH EVERY DAY       LOV: 12/26/2018  RTC: 6 MONTHS  Last Relevant L

## 2019-07-30 ENCOUNTER — OFFICE VISIT (OUTPATIENT)
Dept: FAMILY MEDICINE CLINIC | Facility: CLINIC | Age: 54
End: 2019-07-30
Payer: COMMERCIAL

## 2019-07-30 VITALS
RESPIRATION RATE: 14 BRPM | BODY MASS INDEX: 33.36 KG/M2 | TEMPERATURE: 98 F | HEIGHT: 70 IN | WEIGHT: 233 LBS | SYSTOLIC BLOOD PRESSURE: 124 MMHG | HEART RATE: 70 BPM | DIASTOLIC BLOOD PRESSURE: 80 MMHG

## 2019-07-30 DIAGNOSIS — M79.642 PAIN IN BOTH HANDS: ICD-10-CM

## 2019-07-30 DIAGNOSIS — Z00.00 WELLNESS EXAMINATION: Primary | ICD-10-CM

## 2019-07-30 DIAGNOSIS — E29.1 HYPOGONADISM MALE: ICD-10-CM

## 2019-07-30 DIAGNOSIS — Z12.5 SCREENING FOR MALIGNANT NEOPLASM OF PROSTATE: ICD-10-CM

## 2019-07-30 DIAGNOSIS — F17.200 TOBACCO DEPENDENCE: ICD-10-CM

## 2019-07-30 DIAGNOSIS — M79.641 PAIN IN BOTH HANDS: ICD-10-CM

## 2019-07-30 DIAGNOSIS — R73.03 PRE-DIABETES: ICD-10-CM

## 2019-07-30 PROCEDURE — 99396 PREV VISIT EST AGE 40-64: CPT | Performed by: FAMILY MEDICINE

## 2019-07-30 PROCEDURE — 99213 OFFICE O/P EST LOW 20 MIN: CPT | Performed by: FAMILY MEDICINE

## 2019-07-30 RX ORDER — TRAZODONE HYDROCHLORIDE 100 MG/1
100 TABLET ORAL NIGHTLY
Qty: 90 TABLET | Refills: 3 | Status: SHIPPED | OUTPATIENT
Start: 2019-07-30 | End: 2020-09-02

## 2019-07-30 RX ORDER — HYDROXYZINE 50 MG/1
TABLET, FILM COATED ORAL
Refills: 1 | COMMUNITY
Start: 2019-07-18 | End: 2020-09-02

## 2019-07-30 RX ORDER — VARENICLINE TARTRATE 1 MG/1
1 TABLET, FILM COATED ORAL 2 TIMES DAILY
Qty: 60 TABLET | Refills: 6 | Status: SHIPPED | OUTPATIENT
Start: 2019-07-30 | End: 2019-11-11 | Stop reason: ALTCHOICE

## 2019-07-30 RX ORDER — TRAZODONE HYDROCHLORIDE 100 MG/1
100 TABLET ORAL NIGHTLY
COMMUNITY
End: 2019-07-30

## 2019-07-30 RX ORDER — NALTREXONE HYDROCHLORIDE 50 MG/1
50 TABLET, FILM COATED ORAL
Refills: 1 | COMMUNITY
Start: 2019-07-18 | End: 2019-10-23

## 2019-07-30 RX ORDER — DIPHENOXYLATE HYDROCHLORIDE AND ATROPINE SULFATE 2.5; .025 MG/1; MG/1
TABLET ORAL
COMMUNITY

## 2019-07-30 NOTE — PATIENT INSTRUCTIONS
Thank you for choosing Jackie Mina MD at [place] [address]  To Do: Kenneth Quezada.  1. Please see age appropriate health prevention below    [facility] is located in [address]. Monday, Tuesday & Friday – 8 a.m. to 4 p.m.   Wednesday, that the benefits outweigh those potential risks and we strive to make you healthier and to improve your quality of life.     Referrals, and Radiology Information:    If your insurance requires a referral to a specialist, please allow 5 business days to pro exams   Blood pressure All men in this age group Yearly checkup if your blood pressure is normal  Normal blood pressure is less than 120/80 mm Hg  If your blood pressure reading is higher than normal, follow the advice of your healthcare provider      Avery this age group Ask your healthcare provider   Vaccine Who needs it How often   Chickenpox (varicella) All men in this age group who have no record of this infection or vaccine 2 doses; second dose should be given at least 4 weeks after the first dose   Hep provider At routine exams   Use of daily aspirin Men in this age group at risk for cardiovascular health problems At routine exams   Use of tobacco and the health effects it can cause All men in this age group Every visit   [facility] include:  1. Start by giving up cigarettes at the times you least need them. 2. Keeping a piece of fruit close by at the times you are most vulnerable to reach for a cigarette. 3. Using a nicotine replacement product instead of a cigarette.   Write down a

## 2019-07-30 NOTE — PROGRESS NOTES
Wellness Exam    REASON FOR VISIT:    Corrinne Farrier. is a 48year old male who presents for an [address]     Current Complaints: Mr. Ran Farmer is a pleasant 47 y/o M here for his wellness exam  Flu Shot: see immunization record  [address] No    Scoring  Total Score: 0     PHQ-4: Over the LAST 2 WEEKS       Depression Screening (PHQ-2/PHQ-9): Over the LAST 2 WEEKS   Little interest or pleasure in doing things (over the last two weeks)?: Several days    Feeling down, depressed, or hopeless (o for adults born 1945-1 26 No results found for: HCVAB    Tuberculosis Screen If high risk No components found for: PPDINDURAT      ALLERGIES:     Meperidine              NAUSEA AND VOMITING, OTHER (SEE                            COMMENTS)  Penicillins mouth daily. Disp: 30 tablet Rfl: 0   Cholecalciferol (VITAMIN D) 2000 units Oral Tab Take 2,000 Units by mouth daily.  Disp:  Rfl:    Albuterol Sulfate HFA (PROAIR HFA) 108 (90 BASE) MCG/ACT Inhalation Aero Soln Inhale 2 puffs into the lungs every 4 (four) • [facility] 6/19/2014   • Uncomfortable fullness after meals    • Unspecified essential hypertension    • Unspecified sleep apnea    • Visual impairment    • Wears glasses    • Weight loss       Past Surgical History:   Procedure Lateralit Negative for color change and rash. Neurological: Negative for tremors, weakness and numbness. Hematological: Negative for adenopathy. Does not bruise/bleed easily. Psychiatric/Behavioral: Negative for confusion and agitation.  The patient is not nerv follows:  Diagnoses and all orders for this visit:    Wellness examination    Tobacco dependence  -     Varenicline Tartrate (CHANTIX) 1 MG Oral Tab; Take 1 tablet (1 mg total) by mouth 2 (two) times daily.   Counseled on smoking cessation and also discusse Immunization History   Administered Date(s) Administered   • Pneumovax 23 06/19/2014   • TDAP 06/19/2014       Influenza Annually   Pneumococcal if high risk   Td/Tdap once then every 10 years   HPV Males 11-21   Zoster (Shingles) 60 and older: one dos

## 2019-07-31 NOTE — TELEPHONE ENCOUNTER
Requesting Novofine  LOV: 7/1/19  RTC: 2 months  Last Relevant Labs: na  Filled: 1/3/19 #90 with 1 refills    Future Appointments   Date Time Provider Alba Skinner   9/4/2019  3:20 PM TEO Hoang EM[facility] [address]   1/30/2020  3:30 PM Shaun

## 2019-08-10 ENCOUNTER — HOSPITAL ENCOUNTER (EMERGENCY)
Age: 54
Discharge: HOME | End: 2019-08-11
Attending: EMERGENCY MEDICINE
Payer: COMMERCIAL

## 2019-08-10 DIAGNOSIS — R31.9 HEMATURIA, UNSPECIFIED TYPE: Primary | ICD-10-CM

## 2019-08-10 PROCEDURE — 80048 BASIC METABOLIC PNL TOTAL CA: CPT | Performed by: EMERGENCY MEDICINE

## 2019-08-10 PROCEDURE — 99284 EMERGENCY DEPT VISIT MOD MDM: CPT

## 2019-08-10 PROCEDURE — 96360 HYDRATION IV INFUSION INIT: CPT

## 2019-08-10 PROCEDURE — 81001 URINALYSIS AUTO W/SCOPE: CPT | Performed by: EMERGENCY MEDICINE

## 2019-08-10 PROCEDURE — 85025 COMPLETE CBC W/AUTO DIFF WBC: CPT | Performed by: EMERGENCY MEDICINE

## 2019-08-10 PROCEDURE — 96361 HYDRATE IV INFUSION ADD-ON: CPT

## 2019-08-11 ENCOUNTER — APPOINTMENT (OUTPATIENT)
Dept: CT IMAGING | Age: 54
End: 2019-08-11
Attending: EMERGENCY MEDICINE
Payer: COMMERCIAL

## 2019-08-11 VITALS
DIASTOLIC BLOOD PRESSURE: 70 MMHG | OXYGEN SATURATION: 100 % | HEIGHT: 73.62 IN | RESPIRATION RATE: 16 BRPM | TEMPERATURE: 98 F | WEIGHT: 230 LBS | BODY MASS INDEX: 29.83 KG/M2 | SYSTOLIC BLOOD PRESSURE: 140 MMHG | HEART RATE: 64 BPM

## 2019-08-11 LAB
ANION GAP SERPL CALC-SCNC: 5 MMOL/L (ref 0–18)
BASOPHILS # BLD AUTO: 0.08 X10(3) UL (ref 0–0.2)
BASOPHILS NFR BLD AUTO: 1.1 %
BILIRUB UR QL STRIP.AUTO: NEGATIVE
BUN BLD-MCNC: 14 MG/DL (ref 7–18)
BUN/CREAT SERPL: 12.7 (ref 10–20)
CALCIUM BLD-MCNC: 8.8 MG/DL (ref 8.5–10.1)
CHLORIDE SERPL-SCNC: 109 MMOL/L (ref 98–112)
CO2 SERPL-SCNC: 26 MMOL/L (ref 21–32)
CREAT BLD-MCNC: 1.1 MG/DL (ref 0.7–1.3)
DEPRECATED RDW RBC AUTO: 47.8 FL (ref 35.1–46.3)
EOSINOPHIL # BLD AUTO: 0.26 X10(3) UL (ref 0–0.7)
EOSINOPHIL NFR BLD AUTO: 3.5 %
ERYTHROCYTE [DISTWIDTH] IN BLOOD BY AUTOMATED COUNT: 14.8 % (ref 11–15)
GLUCOSE BLD-MCNC: 82 MG/DL (ref 70–99)
GLUCOSE UR STRIP.AUTO-MCNC: NEGATIVE MG/DL
HCT VFR BLD AUTO: 47.8 % (ref 39–53)
HGB BLD-MCNC: 16 G/DL (ref 13–17.5)
IMM GRANULOCYTES # BLD AUTO: 0.03 X10(3) UL (ref 0–1)
IMM GRANULOCYTES NFR BLD: 0.4 %
KETONES UR STRIP.AUTO-MCNC: NEGATIVE MG/DL
LEUKOCYTE ESTERASE UR QL STRIP.AUTO: NEGATIVE
LYMPHOCYTES # BLD AUTO: 2.42 X10(3) UL (ref 1–4)
LYMPHOCYTES NFR BLD AUTO: 32.3 %
MCH RBC QN AUTO: 29.2 PG (ref 26–34)
MCHC RBC AUTO-ENTMCNC: 33.5 G/DL (ref 31–37)
MCV RBC AUTO: 87.2 FL (ref 80–100)
MONOCYTES # BLD AUTO: 0.62 X10(3) UL (ref 0.1–1)
MONOCYTES NFR BLD AUTO: 8.3 %
NEUTROPHILS # BLD AUTO: 4.09 X10 (3) UL (ref 1.5–7.7)
NEUTROPHILS # BLD AUTO: 4.09 X10(3) UL (ref 1.5–7.7)
NEUTROPHILS NFR BLD AUTO: 54.4 %
NITRITE UR QL STRIP.AUTO: NEGATIVE
OSMOLALITY SERPL CALC.SUM OF ELEC: 290 MOSM/KG (ref 275–295)
PH UR STRIP.AUTO: 7 [PH] (ref 4.5–8)
PLATELET # BLD AUTO: 188 10(3)UL (ref 150–450)
POTASSIUM SERPL-SCNC: 3.9 MMOL/L (ref 3.5–5.1)
RBC # BLD AUTO: 5.48 X10(6)UL (ref 4.3–5.7)
RBC #/AREA URNS AUTO: >10 /HPF
SODIUM SERPL-SCNC: 140 MMOL/L (ref 136–145)
SP GR UR STRIP.AUTO: 1.02 (ref 1–1.03)
UROBILINOGEN UR STRIP.AUTO-MCNC: 1 MG/DL
WBC # BLD AUTO: 7.5 X10(3) UL (ref 4–11)

## 2019-08-11 PROCEDURE — 74177 CT ABD & PELVIS W/CONTRAST: CPT | Performed by: EMERGENCY MEDICINE

## 2019-08-11 NOTE — ED PROVIDER NOTES
Patient Seen in: [facility] Emergency Department In [place]    History   Patient presents with:  Urinary Symptoms (urologic)    Stated Complaint: blood in urine    HPI    This is a 63-year-old male who presents with painless hematuria.   This is 3 episodes Procedure Laterality Date   • BRONCHOSCOPY N/A 1/8/2015    Performed by Puma Lombardi MD at [facility] ENDOSCOPY   • COLONOSCOPY  2014   • COLONOSCOPY     • FUSION OF ANKLE JOINT     • HERNIA SURGERY     • LITHOTRIPSY     • UMBILICAL HERNIA REPAIR Notable for the following components:    RBC URINE >10 (*)     Squamous Epi.  Cells Moderate (*)     All other components within normal limits   CBC W/ DIFFERENTIAL - Abnormal; Notable for the following components:    RDW-SD 47.8 (*)     All other component

## 2019-08-12 ENCOUNTER — TELEPHONE (OUTPATIENT)
Dept: FAMILY MEDICINE CLINIC | Facility: CLINIC | Age: 54
End: 2019-08-12

## 2019-08-12 NOTE — TELEPHONE ENCOUNTER
Called wife, Bobby Prater, she states she called Dr. Pearl Salvage office, was offered an appointment with another provider in [place]. Advised on calling the practice back and see what provider can see pt today.  Grant Mckinnon may need to travel today to be seen, but

## 2019-08-12 NOTE — TELEPHONE ENCOUNTER
Patient's spouse states he has blood in his urine, went to the ED, referred to a doctor too far, would like a referral for a doctor closer to home and want an appt today, please advise.

## 2019-08-15 ENCOUNTER — TELEPHONE (OUTPATIENT)
Dept: FAMILY MEDICINE CLINIC | Facility: CLINIC | Age: 54
End: 2019-08-15

## 2019-08-15 RX ORDER — SILDENAFIL CITRATE 20 MG/1
TABLET ORAL
Qty: 8 TABLET | Refills: 5 | Status: SHIPPED | OUTPATIENT
Start: 2019-08-15 | End: 2019-12-13

## 2019-08-15 NOTE — TELEPHONE ENCOUNTER
PSA results from 9/9/17 has been faxed to [facility] per their request @ [phone]

## 2019-08-15 NOTE — TELEPHONE ENCOUNTER
Requesting SILDENAFIL CITRATE 20 MG   LOV: 7/30/19  RTC:  Last Relevant Labs: 8/10/19  Filled: 3/19/19 # 8 with 5 refills    Future Appointments   Date Time Provider Alba Skinner   8/17/2019  7:00 AM [facility]   9/4/2019  3:20

## 2019-08-20 ENCOUNTER — LAB ENCOUNTER (OUTPATIENT)
Dept: LAB | Age: 54
End: 2019-08-20
Attending: FAMILY MEDICINE
Payer: COMMERCIAL

## 2019-08-20 DIAGNOSIS — Z12.5 SCREENING FOR MALIGNANT NEOPLASM OF PROSTATE: ICD-10-CM

## 2019-08-20 DIAGNOSIS — E29.1 HYPOGONADISM MALE: ICD-10-CM

## 2019-08-20 DIAGNOSIS — R73.03 PRE-DIABETES: ICD-10-CM

## 2019-08-20 LAB
ALBUMIN SERPL-MCNC: 4.1 G/DL (ref 3.4–5)
ALBUMIN/GLOB SERPL: 1 {RATIO} (ref 1–2)
ALP LIVER SERPL-CCNC: 52 U/L (ref 45–117)
ALT SERPL-CCNC: 26 U/L (ref 16–61)
ANION GAP SERPL CALC-SCNC: 5 MMOL/L (ref 0–18)
AST SERPL-CCNC: 19 U/L (ref 15–37)
BASOPHILS # BLD AUTO: 0.07 X10(3) UL (ref 0–0.2)
BASOPHILS NFR BLD AUTO: 0.9 %
BILIRUB SERPL-MCNC: 0.6 MG/DL (ref 0.1–2)
BUN BLD-MCNC: 9 MG/DL (ref 7–18)
BUN/CREAT SERPL: 8.2 (ref 10–20)
CALCIUM BLD-MCNC: 9.5 MG/DL (ref 8.5–10.1)
CHLORIDE SERPL-SCNC: 110 MMOL/L (ref 98–112)
CHOLEST SMN-MCNC: 228 MG/DL (ref ?–200)
CO2 SERPL-SCNC: 25 MMOL/L (ref 21–32)
COMPLEXED PSA SERPL-MCNC: 0.68 NG/ML (ref ?–4)
CREAT BLD-MCNC: 1.1 MG/DL (ref 0.7–1.3)
DEPRECATED RDW RBC AUTO: 48.6 FL (ref 35.1–46.3)
EOSINOPHIL # BLD AUTO: 0.23 X10(3) UL (ref 0–0.7)
EOSINOPHIL NFR BLD AUTO: 3.1 %
ERYTHROCYTE [DISTWIDTH] IN BLOOD BY AUTOMATED COUNT: 14.8 % (ref 11–15)
EST. AVERAGE GLUCOSE BLD GHB EST-MCNC: 126 MG/DL (ref 68–126)
GLOBULIN PLAS-MCNC: 4.1 G/DL (ref 2.8–4.4)
GLUCOSE BLD-MCNC: 118 MG/DL (ref 70–99)
HBA1C MFR BLD HPLC: 6 % (ref ?–5.7)
HCT VFR BLD AUTO: 54.7 % (ref 39–53)
HDLC SERPL-MCNC: 28 MG/DL (ref 40–59)
HGB BLD-MCNC: 17.9 G/DL (ref 13–17.5)
IMM GRANULOCYTES # BLD AUTO: 0.04 X10(3) UL (ref 0–1)
IMM GRANULOCYTES NFR BLD: 0.5 %
LDLC SERPL CALC-MCNC: 160 MG/DL (ref ?–100)
LYMPHOCYTES # BLD AUTO: 2.1 X10(3) UL (ref 1–4)
LYMPHOCYTES NFR BLD AUTO: 28.5 %
M PROTEIN MFR SERPL ELPH: 8.2 G/DL (ref 6.4–8.2)
MCH RBC QN AUTO: 29.1 PG (ref 26–34)
MCHC RBC AUTO-ENTMCNC: 32.7 G/DL (ref 31–37)
MCV RBC AUTO: 88.8 FL (ref 80–100)
MONOCYTES # BLD AUTO: 0.54 X10(3) UL (ref 0.1–1)
MONOCYTES NFR BLD AUTO: 7.3 %
NEUTROPHILS # BLD AUTO: 4.39 X10 (3) UL (ref 1.5–7.7)
NEUTROPHILS # BLD AUTO: 4.39 X10(3) UL (ref 1.5–7.7)
NEUTROPHILS NFR BLD AUTO: 59.7 %
NONHDLC SERPL-MCNC: 200 MG/DL (ref ?–130)
OSMOLALITY SERPL CALC.SUM OF ELEC: 290 MOSM/KG (ref 275–295)
PLATELET # BLD AUTO: 233 10(3)UL (ref 150–450)
POTASSIUM SERPL-SCNC: 4.4 MMOL/L (ref 3.5–5.1)
RBC # BLD AUTO: 6.16 X10(6)UL (ref 4.3–5.7)
SODIUM SERPL-SCNC: 140 MMOL/L (ref 136–145)
TRIGL SERPL-MCNC: 202 MG/DL (ref 30–149)
VLDLC SERPL CALC-MCNC: 40 MG/DL (ref 0–30)
WBC # BLD AUTO: 7.4 X10(3) UL (ref 4–11)

## 2019-08-20 PROCEDURE — 80061 LIPID PANEL: CPT | Performed by: FAMILY MEDICINE

## 2019-08-20 PROCEDURE — 36415 COLL VENOUS BLD VENIPUNCTURE: CPT | Performed by: FAMILY MEDICINE

## 2019-08-20 PROCEDURE — 83036 HEMOGLOBIN GLYCOSYLATED A1C: CPT | Performed by: FAMILY MEDICINE

## 2019-08-20 PROCEDURE — 85025 COMPLETE CBC W/AUTO DIFF WBC: CPT | Performed by: FAMILY MEDICINE

## 2019-08-20 PROCEDURE — 84153 ASSAY OF PSA TOTAL: CPT | Performed by: FAMILY MEDICINE

## 2019-08-20 PROCEDURE — 84403 ASSAY OF TOTAL TESTOSTERONE: CPT | Performed by: FAMILY MEDICINE

## 2019-08-20 PROCEDURE — 84402 ASSAY OF FREE TESTOSTERONE: CPT | Performed by: FAMILY MEDICINE

## 2019-08-20 PROCEDURE — 80053 COMPREHEN METABOLIC PANEL: CPT | Performed by: FAMILY MEDICINE

## 2019-08-20 NOTE — PROGRESS NOTES
LDL cholesterol improved. Continue with current regimen Otherlabs with no concerning values.  Please notify patient.    -Dr. Shannon Love

## 2019-08-24 LAB
TESTOSTERONE TOTAL: 288.3 NG/DL
TESTOSTERONE, FREE -MS/MS: 43.5 PG/ML

## 2019-09-10 DIAGNOSIS — E66.01 SEVERE OBESITY WITH BODY MASS INDEX (BMI) OF 35.0 TO 39.9 WITH SERIOUS COMORBIDITY (HCC): ICD-10-CM

## 2019-09-10 DIAGNOSIS — Z51.81 ENCOUNTER FOR THERAPEUTIC DRUG MONITORING: ICD-10-CM

## 2019-09-11 RX ORDER — FEBUXOSTAT 40 MG/1
TABLET ORAL
Qty: 90 TABLET | Refills: 1 | Status: SHIPPED | OUTPATIENT
Start: 2019-09-11 | End: 2020-03-04

## 2019-09-11 NOTE — TELEPHONE ENCOUNTER
Requesting Phentermine  LOV: 7/1/19  RTC: 2 months  Last Relevant Labs: na  Filled: 5/6/19 #30 with 2 refills    Future Appointments   Date Time Provider Alba Skinner   10/30/2019  4:15 PM Eriberto Watkins DO [facility]   1/30/2020  3:30 PM Edison Villafana MD EM[facility] [address] [facility]     Pt cancelled his 9/4/19 for surgery.

## 2019-09-11 NOTE — TELEPHONE ENCOUNTER
Requesting ULORIC 40 MG   LOV: 7/30/19  RTC: 6 months  Last Relevant Labs: 8/20/19  Filled: 3/19/19 # 90 with 1 refills    Future Appointments   Date Time Provider Alba Skinner   10/30/2019  4:15 PM Omaira Varma [facility] Sheeba Muhammad   1/30/2020  3

## 2019-09-18 NOTE — TELEPHONE ENCOUNTER
My chart sent to see what is going on if he can schedule? No response  LM on phone to call and schedule.

## 2019-09-19 NOTE — TELEPHONE ENCOUNTER
I have tried to reach patient to schedule. No response. With last cancellation is noted he was having surgery. Do you want to fill or deny?

## 2019-09-20 RX ORDER — PHENTERMINE HYDROCHLORIDE 37.5 MG/1
TABLET ORAL
Qty: 30 TABLET | Refills: 2 | OUTPATIENT
Start: 2019-09-20

## 2019-10-10 ENCOUNTER — TELEPHONE (OUTPATIENT)
Dept: FAMILY MEDICINE CLINIC | Facility: CLINIC | Age: 54
End: 2019-10-10

## 2019-10-10 DIAGNOSIS — E66.01 SEVERE OBESITY WITH BODY MASS INDEX (BMI) OF 35.0 TO 39.9 WITH SERIOUS COMORBIDITY (HCC): ICD-10-CM

## 2019-10-10 DIAGNOSIS — Z51.81 ENCOUNTER FOR THERAPEUTIC DRUG MONITORING: ICD-10-CM

## 2019-10-10 NOTE — TELEPHONE ENCOUNTER
INSURANCE ONLY ALLOWS PATIENT TO HAVE #180 TAB EVERY 365DAYS  Regarding chantix 1mg tablets  Please advise

## 2019-10-10 NOTE — TELEPHONE ENCOUNTER
Spoke with pharmacy, pt last filled Rx on 7/31/19 for #168 tablets (84 day supply). Insurance will only allow 180 tablets per year and he has exceeding that quantity. Do you want him to schedule appt to discuss smoking cessation?

## 2019-10-11 ENCOUNTER — TELEPHONE (OUTPATIENT)
Dept: FAMILY MEDICINE CLINIC | Facility: CLINIC | Age: 54
End: 2019-10-11

## 2019-10-11 DIAGNOSIS — Z51.81 ENCOUNTER FOR THERAPEUTIC DRUG MONITORING: ICD-10-CM

## 2019-10-11 DIAGNOSIS — I10 ESSENTIAL HYPERTENSION: ICD-10-CM

## 2019-10-11 DIAGNOSIS — E66.01 SEVERE OBESITY WITH BODY MASS INDEX (BMI) OF 35.0 TO 39.9 WITH SERIOUS COMORBIDITY (HCC): ICD-10-CM

## 2019-10-11 DIAGNOSIS — R73.03 PRE-DIABETES: ICD-10-CM

## 2019-10-11 RX ORDER — PHENTERMINE HYDROCHLORIDE 37.5 MG/1
TABLET ORAL
Qty: 30 TABLET | Refills: 2 | OUTPATIENT
Start: 2019-10-11

## 2019-10-11 RX ORDER — METFORMIN HYDROCHLORIDE 750 MG/1
1500 TABLET, EXTENDED RELEASE ORAL
Qty: 180 TABLET | Refills: 1 | OUTPATIENT
Start: 2019-10-11

## 2019-10-11 RX ORDER — TOPIRAMATE 100 MG/1
TABLET, FILM COATED ORAL
Qty: 180 TABLET | Refills: 1 | OUTPATIENT
Start: 2019-10-11

## 2019-10-11 NOTE — TELEPHONE ENCOUNTER
Requesting Metformin, Jardiance, Topiramate  LOV: 7/1/19  RTC: 2 months  Last Relevant Labs: 8/20/19  Filled: 5/6/19  #180 with 1 refills Metformin  Filled: 7/1/19 #90 with 0 refills  Jardiance  Filled: 5/6/19 #180 with 1 refll Topiramate    Future Appoint

## 2019-10-11 NOTE — TELEPHONE ENCOUNTER
Requesting Phentermine  LOV: 7/1/19  RTC: 2 months  Last Relevant Labs: na  Filled: 5/6/19 #30 with 2 refills    Future Appointments   Date Time Provider Alba Skinner   10/23/2019  4:00 PM TEO Wall [address]   10/30/2019  4:15

## 2019-10-11 NOTE — TELEPHONE ENCOUNTER
[facility] pharmacy is requesting PA for generic Chantix. PA submitted through [facility] ETELVINA, sent to [employer]. Awaiting on approval/denial.  [employer] is reviewing your PA request. Typically an electronic response will be received within 72 hours.     KEY: [id number]

## 2019-10-12 RX ORDER — EMPAGLIFLOZIN 10 MG/1
TABLET, FILM COATED ORAL
Qty: 90 TABLET | Refills: 0 | Status: SHIPPED | OUTPATIENT
Start: 2019-10-12 | End: 2020-05-11

## 2019-10-12 NOTE — TELEPHONE ENCOUNTER
Letter of denial placed in dr. Bing oliva for review  Future Appointments   Date Time Provider Alba Skinner   10/23/2019  4:00 PM TEO [facility] [address]   10/30/2019  4:15 PM Sarah Trevizo DO [facility] [address] March   1/30/2020  3

## 2019-10-12 NOTE — TELEPHONE ENCOUNTER
Please inform Del Thomas about this. If we could facilitate him using Chantix as he is really working hard on quitting smoking. I will be good.

## 2019-10-14 NOTE — TELEPHONE ENCOUNTER
Informed pt of this denial due to limit reached in quantity covered per year and Dr. Yair Grimm input on alternative and pt expressed understanding and states he does not want the Wellbutrin and declined further exploring another alterative.    Denial state

## 2019-10-14 NOTE — TELEPHONE ENCOUNTER
All coupons still have the Chantix at over $400 per month. Have pt schedule OV to discuss alternative medication?

## 2019-10-14 NOTE — TELEPHONE ENCOUNTER
We can do that. The other alternative is Wellbutrin but I an not sure if he has had it. He has history of depression.

## 2019-10-19 NOTE — TELEPHONE ENCOUNTER
Requested Prescriptions     Pending Prescriptions Disp Refills   • Testosterone 30 MG/ACT Transdermal Solution [Pharmacy Med Name: TESTOSTERONE 30 MG/1.5 ML PUMP] 180 mL 1     Sig: APPLY 2 PUMP ACTUATIONS TOPICALLY DAILY     Non protocol medication    LOV:

## 2019-10-21 RX ORDER — TESTOSTERONE 30 MG/1.5ML
SOLUTION TOPICAL
Qty: 180 ML | Refills: 1 | Status: SHIPPED | OUTPATIENT
Start: 2019-10-21 | End: 2020-02-26

## 2019-10-21 NOTE — TELEPHONE ENCOUNTER
Faxed Rx for Testosterone approved by Dr. Sindy Veliz to [facility] [phone]. Fax confirmation received.

## 2019-10-22 DIAGNOSIS — Z51.81 ENCOUNTER FOR THERAPEUTIC DRUG MONITORING: ICD-10-CM

## 2019-10-22 DIAGNOSIS — R73.03 PRE-DIABETES: ICD-10-CM

## 2019-10-22 DIAGNOSIS — I10 ESSENTIAL HYPERTENSION: ICD-10-CM

## 2019-10-22 DIAGNOSIS — E66.01 SEVERE OBESITY WITH BODY MASS INDEX (BMI) OF 35.0 TO 39.9 WITH SERIOUS COMORBIDITY (HCC): ICD-10-CM

## 2019-10-22 NOTE — TELEPHONE ENCOUNTER
Requesting Lesly  LOV: 7/1/19  RTC: 2 months  Last Relevant Labs: na  Filled: 5/6/19 #5 pens with 5 refills    Future Appointments   Date Time Provider Alba Skinner   10/23/2019  4:00 PM TEO Darnell [address]   10/30/2019  4:15

## 2019-10-23 ENCOUNTER — OFFICE VISIT (OUTPATIENT)
Dept: INTERNAL MEDICINE CLINIC | Facility: CLINIC | Age: 54
End: 2019-10-23
Payer: COMMERCIAL

## 2019-10-23 VITALS
HEART RATE: 66 BPM | WEIGHT: 234 LBS | SYSTOLIC BLOOD PRESSURE: 100 MMHG | HEIGHT: 70 IN | RESPIRATION RATE: 14 BRPM | DIASTOLIC BLOOD PRESSURE: 60 MMHG | BODY MASS INDEX: 33.5 KG/M2

## 2019-10-23 DIAGNOSIS — R73.03 PRE-DIABETES: ICD-10-CM

## 2019-10-23 DIAGNOSIS — Z51.81 ENCOUNTER FOR THERAPEUTIC DRUG MONITORING: Primary | ICD-10-CM

## 2019-10-23 DIAGNOSIS — E78.1 HYPERTRIGLYCERIDEMIA: ICD-10-CM

## 2019-10-23 DIAGNOSIS — I10 ESSENTIAL HYPERTENSION: ICD-10-CM

## 2019-10-23 DIAGNOSIS — E66.01 SEVERE OBESITY WITH BODY MASS INDEX (BMI) OF 35.0 TO 39.9 WITH SERIOUS COMORBIDITY (HCC): ICD-10-CM

## 2019-10-23 PROCEDURE — 99214 OFFICE O/P EST MOD 30 MIN: CPT | Performed by: NURSE PRACTITIONER

## 2019-10-23 RX ORDER — METFORMIN HYDROCHLORIDE 750 MG/1
1500 TABLET, EXTENDED RELEASE ORAL
Qty: 180 TABLET | Refills: 1 | Status: SHIPPED | OUTPATIENT
Start: 2019-10-23 | End: 2020-04-29

## 2019-10-23 RX ORDER — TOPIRAMATE 100 MG/1
100 TABLET, FILM COATED ORAL 2 TIMES DAILY
Qty: 180 TABLET | Refills: 1 | Status: SHIPPED | OUTPATIENT
Start: 2019-10-23 | End: 2020-04-29

## 2019-10-23 RX ORDER — PHENTERMINE HYDROCHLORIDE 37.5 MG/1
37.5 TABLET ORAL EVERY MORNING
Qty: 30 TABLET | Refills: 2 | Status: SHIPPED | OUTPATIENT
Start: 2019-10-23 | End: 2020-01-20

## 2019-10-23 NOTE — PROGRESS NOTES
Dawn Nova. is a 47year old male presents today for follow-up on medical weight loss program for the treatment of overweight, obesity, or morbid obesity with HTN, prediabetes, hyperlipidemia, JAVID.    S:  Current weight Wt Readings from Last 6 Enc without clicks or gallops, no pedal edema.   GI: +BS, soft, non tender  NEURO/MS: motor and sensory grossly intact  PSYCH: pleasant, cooperative, normal mood    ASSESSMENT AND PLAN:  Encounter for therapeutic drug monitoring  (primary encounter diagnosis) improving your relationship with food. ..seeing it a fuel for healthy living! Recommend follow up with our dietician.     Patient Resources:    Personal Training/Fitness Classes    · [address] @ Share Some Style.pt by Mita Zuleta  · The Complete Guide to fasting by Dr. Mirna Smith  · Sugar, Salt & Fat by Slim Parker, Ph.D, R.D.  · Fed Up - documentary on sugar  · The Dr. Riya Marley by Dr. Bon Carbajal MD  · [employer] Fits[employer] - journal to better health

## 2019-10-23 NOTE — PATIENT INSTRUCTIONS
Continue making lifestyle changes that focus on good nutrition, regular exercise and stress management. Medication Plan: Continue current medication regimen. Agreed upon goal/s before next office visit include: Deonna Wright work!  Continue to work on nutriti low carb swaps    Stress Management/Behavior/Mindful Eating  · CALM meditation paula  · Headspace  · Am I Hungry? Mindful eating virtual  paula  · Www.yourweightmatters. org - Obesity Action Coalition sponsored Blog posts daily    Books/Video Education  ·

## 2019-10-26 PROBLEM — E78.1 HYPERTRIGLYCERIDEMIA: Status: ACTIVE | Noted: 2019-10-26

## 2019-10-30 ENCOUNTER — OFFICE VISIT (OUTPATIENT)
Dept: RHEUMATOLOGY | Facility: CLINIC | Age: 54
End: 2019-10-30
Payer: COMMERCIAL

## 2019-10-30 VITALS
BODY MASS INDEX: 30.48 KG/M2 | TEMPERATURE: 98 F | WEIGHT: 230 LBS | HEART RATE: 64 BPM | DIASTOLIC BLOOD PRESSURE: 82 MMHG | HEIGHT: 73 IN | SYSTOLIC BLOOD PRESSURE: 128 MMHG | RESPIRATION RATE: 18 BRPM

## 2019-10-30 DIAGNOSIS — Z87.19 HISTORY OF ULCERATIVE COLITIS: ICD-10-CM

## 2019-10-30 DIAGNOSIS — M15.9 PRIMARY OSTEOARTHRITIS INVOLVING MULTIPLE JOINTS: ICD-10-CM

## 2019-10-30 DIAGNOSIS — F17.200 CURRENT SMOKER: ICD-10-CM

## 2019-10-30 DIAGNOSIS — R20.2 NUMBNESS AND TINGLING IN BOTH HANDS: ICD-10-CM

## 2019-10-30 DIAGNOSIS — R20.0 NUMBNESS AND TINGLING IN BOTH HANDS: ICD-10-CM

## 2019-10-30 DIAGNOSIS — S69.92XA INJURY OF LEFT INDEX FINGER, INITIAL ENCOUNTER: ICD-10-CM

## 2019-10-30 DIAGNOSIS — M79.641 PAIN IN BOTH HANDS: ICD-10-CM

## 2019-10-30 DIAGNOSIS — M79.642 PAIN IN BOTH HANDS: ICD-10-CM

## 2019-10-30 DIAGNOSIS — I73.00 RAYNAUD'S DISEASE WITHOUT GANGRENE: Primary | ICD-10-CM

## 2019-10-30 PROCEDURE — 99244 OFF/OP CNSLTJ NEW/EST MOD 40: CPT | Performed by: INTERNAL MEDICINE

## 2019-10-30 RX ORDER — LIRAGLUTIDE 6 MG/ML
INJECTION, SOLUTION SUBCUTANEOUS
Refills: 5 | OUTPATIENT
Start: 2019-10-30
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
## 2019-11-02 ENCOUNTER — APPOINTMENT (OUTPATIENT)
Dept: LAB | Age: 54
End: 2019-11-02
Attending: INTERNAL MEDICINE
Payer: COMMERCIAL

## 2019-11-02 ENCOUNTER — HOSPITAL ENCOUNTER (OUTPATIENT)
Dept: GENERAL RADIOLOGY | Age: 54
Discharge: HOME | End: 2019-11-02
Attending: INTERNAL MEDICINE
Payer: COMMERCIAL

## 2019-11-02 DIAGNOSIS — M79.642 PAIN IN BOTH HANDS: ICD-10-CM

## 2019-11-02 DIAGNOSIS — S69.92XA INJURY OF LEFT INDEX FINGER, INITIAL ENCOUNTER: ICD-10-CM

## 2019-11-02 DIAGNOSIS — I73.00 RAYNAUD'S DISEASE WITHOUT GANGRENE: ICD-10-CM

## 2019-11-02 DIAGNOSIS — M79.641 PAIN IN BOTH HANDS: ICD-10-CM

## 2019-11-02 PROCEDURE — 86140 C-REACTIVE PROTEIN: CPT | Performed by: INTERNAL MEDICINE

## 2019-11-02 PROCEDURE — 86431 RHEUMATOID FACTOR QUANT: CPT | Performed by: INTERNAL MEDICINE

## 2019-11-02 PROCEDURE — 85652 RBC SED RATE AUTOMATED: CPT | Performed by: INTERNAL MEDICINE

## 2019-11-02 PROCEDURE — 86200 CCP ANTIBODY: CPT | Performed by: INTERNAL MEDICINE

## 2019-11-02 PROCEDURE — 86038 ANTINUCLEAR ANTIBODIES: CPT | Performed by: INTERNAL MEDICINE

## 2019-11-02 PROCEDURE — 36415 COLL VENOUS BLD VENIPUNCTURE: CPT | Performed by: INTERNAL MEDICINE

## 2019-11-02 PROCEDURE — 73130 X-RAY EXAM OF HAND: CPT | Performed by: INTERNAL MEDICINE

## 2019-11-04 RX ORDER — HYDROXYZINE 50 MG/1
TABLET, FILM COATED ORAL
Qty: 180 TABLET | Refills: 1 | Status: SHIPPED | OUTPATIENT
Start: 2019-11-04 | End: 2019-11-11

## 2019-11-04 NOTE — TELEPHONE ENCOUNTER
Requesting HYDROXYZINE HCL 50 MG   LOV: 7/30/19  RTC:   Last Relevant Labs: 8/20/19  Filled: 7/18/19    Future Appointments   Date Time Provider Alba Skinner   1/20/2020  3:20 PM TEO Salazar EM[facility] [address]   1/30/2020  3:30 PM Darian Ventura

## 2019-11-06 ENCOUNTER — TELEPHONE (OUTPATIENT)
Dept: RHEUMATOLOGY | Facility: CLINIC | Age: 54
End: 2019-11-06

## 2019-11-06 DIAGNOSIS — I73.00 RAYNAUD'S DISEASE WITHOUT GANGRENE: Primary | ICD-10-CM

## 2019-11-06 DIAGNOSIS — M79.641 PAIN IN BOTH HANDS: ICD-10-CM

## 2019-11-06 DIAGNOSIS — M79.642 PAIN IN BOTH HANDS: ICD-10-CM

## 2019-11-06 DIAGNOSIS — M79.645 FINGER PAIN, LEFT: ICD-10-CM

## 2019-11-11 ENCOUNTER — OFFICE VISIT (OUTPATIENT)
Dept: FAMILY MEDICINE CLINIC | Facility: CLINIC | Age: 54
End: 2019-11-11
Payer: COMMERCIAL

## 2019-11-11 VITALS
TEMPERATURE: 98 F | SYSTOLIC BLOOD PRESSURE: 118 MMHG | DIASTOLIC BLOOD PRESSURE: 78 MMHG | RESPIRATION RATE: 14 BRPM | BODY MASS INDEX: 29.82 KG/M2 | WEIGHT: 225 LBS | HEART RATE: 70 BPM | HEIGHT: 73 IN

## 2019-11-11 DIAGNOSIS — I10 ESSENTIAL HYPERTENSION: Primary | ICD-10-CM

## 2019-11-11 DIAGNOSIS — I73.00 RAYNAUD'S DISEASE WITHOUT GANGRENE: ICD-10-CM

## 2019-11-11 PROCEDURE — 99214 OFFICE O/P EST MOD 30 MIN: CPT | Performed by: FAMILY MEDICINE

## 2019-11-11 RX ORDER — NIFEDIPINE 60 MG/1
60 TABLET, FILM COATED, EXTENDED RELEASE ORAL DAILY
Qty: 30 TABLET | Refills: 0 | Status: SHIPPED | OUTPATIENT
Start: 2019-11-11 | End: 2019-12-04

## 2019-11-11 NOTE — PROGRESS NOTES
HPI:    Patient ID: Ronak Lewis. is a 47year old male. HPI   Mr. Vazquez Dela Cruz is a pleasant 80-year-old gentleman with history of hypertension, hyperlipidemia, prediabetes, obesity, depression, anxiety, history of alcohol dependence, tobacco dependen Solution APPLY 2 PUMP ACTUATIONS TOPICALLY DAILY 180 mL 1   • JARDIANCE 10 MG Oral Tab TAKE 1 TABLET BY MOUTH EVERY DAY 90 tablet 0   • ULORIC 40 MG Oral Tab TAKE 1 TABLET BY MOUTH EVERY DAY 90 tablet 1   • SILDENAFIL CITRATE 20 MG Oral Tab TAKE 1 TABLET B and breath sounds normal. No respiratory distress. He has no wheezes. He has no rales. Abdominal: Soft. Bowel sounds are normal. He exhibits no distension and no mass. There is no tenderness. There is no rebound.  Musculoskeletal:         General: No chandler

## 2019-11-11 NOTE — PATIENT INSTRUCTIONS
Thank you for choosing Gina Olivo MD at [place] [address]  To Do: Armida Puente.  1. Please take meds as directed. [facility] is located in [address]. Monday, Tuesday & Friday – 8 a.m. to 4 p.m.   Wednesday, Thursday – 7 a.m. to outweigh those potential risks and we strive to make you healthier and to improve your quality of life.     Referrals, and Radiology Information:    If your insurance requires a referral to a specialist, please allow 5 business days to process your referral the cause of Raynaud disease? With Raynaud disease, it is believed that blood vessels in the affected areas overrespond to certain triggers, such as cold. This makes them narrow (called vasospasm) much more than in people without the disease.  Experts don’ enough to limit attacks. Your healthcare provider may suggest the following:  · Take precautions to help prevent your hands and feet from losing circulation. This includes:  ? Dressing warmly in cold weather.   ? Wearing gloves or mittens when your hands ma joint becomes painful or swollen  Date Last Reviewed: 6/1/2018  © 7015-3227 [employer]. [facility] [place]. All rights reserved. This information is not intended as a substitute for professional medical care.  Always fo

## 2019-11-15 ENCOUNTER — HOSPITAL ENCOUNTER (OUTPATIENT)
Dept: MRI IMAGING | Age: 54
Discharge: HOME | End: 2019-11-15
Attending: INTERNAL MEDICINE
Payer: COMMERCIAL

## 2019-11-15 DIAGNOSIS — M79.645 FINGER PAIN, LEFT: ICD-10-CM

## 2019-11-15 DIAGNOSIS — M79.642 PAIN IN BOTH HANDS: ICD-10-CM

## 2019-11-15 DIAGNOSIS — I73.00 RAYNAUD'S DISEASE WITHOUT GANGRENE: ICD-10-CM

## 2019-11-15 DIAGNOSIS — M79.641 PAIN IN BOTH HANDS: ICD-10-CM

## 2019-11-15 PROCEDURE — 73218 MRI UPPER EXTREMITY W/O DYE: CPT | Performed by: INTERNAL MEDICINE

## 2019-12-04 RX ORDER — NIFEDIPINE 60 MG/1
TABLET, FILM COATED, EXTENDED RELEASE ORAL
Qty: 30 TABLET | Refills: 5 | Status: SHIPPED | OUTPATIENT
Start: 2019-12-04 | End: 2020-05-21

## 2019-12-04 NOTE — TELEPHONE ENCOUNTER
Hypertension Medications Protocol Cwvojn08/4 1:33 PM   CMP or BMP in past 12 months    Last serum creatinine< 2.0    Appointment in past 6 or next 3 months     Requesting NIFEDIPINE ER 60 MG   LOV: 11/11/19  RTC: 1 month  Last Relevant Labs: 8/20/19  [facility]

## 2019-12-07 ENCOUNTER — LAB ENCOUNTER (OUTPATIENT)
Dept: LAB | Age: 54
End: 2019-12-07
Attending: FAMILY MEDICINE
Payer: COMMERCIAL

## 2019-12-07 ENCOUNTER — OFFICE VISIT (OUTPATIENT)
Dept: FAMILY MEDICINE CLINIC | Facility: CLINIC | Age: 54
End: 2019-12-07
Payer: COMMERCIAL

## 2019-12-07 VITALS
HEIGHT: 73 IN | DIASTOLIC BLOOD PRESSURE: 78 MMHG | RESPIRATION RATE: 16 BRPM | BODY MASS INDEX: 28.63 KG/M2 | HEART RATE: 77 BPM | SYSTOLIC BLOOD PRESSURE: 126 MMHG | TEMPERATURE: 97 F | WEIGHT: 216 LBS

## 2019-12-07 DIAGNOSIS — I10 ESSENTIAL HYPERTENSION: Primary | ICD-10-CM

## 2019-12-07 DIAGNOSIS — R79.89 LOW TESTOSTERONE: ICD-10-CM

## 2019-12-07 DIAGNOSIS — I73.00 RAYNAUD'S DISEASE WITHOUT GANGRENE: ICD-10-CM

## 2019-12-07 PROCEDURE — 99213 OFFICE O/P EST LOW 20 MIN: CPT | Performed by: FAMILY MEDICINE

## 2019-12-07 PROCEDURE — 85025 COMPLETE CBC W/AUTO DIFF WBC: CPT | Performed by: FAMILY MEDICINE

## 2019-12-07 PROCEDURE — 36415 COLL VENOUS BLD VENIPUNCTURE: CPT | Performed by: FAMILY MEDICINE

## 2019-12-07 PROCEDURE — 80053 COMPREHEN METABOLIC PANEL: CPT | Performed by: FAMILY MEDICINE

## 2019-12-07 PROCEDURE — 84403 ASSAY OF TOTAL TESTOSTERONE: CPT | Performed by: FAMILY MEDICINE

## 2019-12-07 NOTE — PATIENT INSTRUCTIONS
Thank you for choosing Eris Miller MD at [place] [address]  To Do: Ahsan Organ.  1. High Blood pressure  What Is a Normal Blood Pressure?   The [address] on Prevention, Detection, Evaluation, and Treatment of High Blood Pressur pelvis, and legs   Heart failure   Poor blood supply to the legs  Erectile Dysfunction  Problems with your vision    Overview  \"Blood pressure\" is the force of blood pushing against the walls of the arteries as the heart pumps blood.  If this pressure ris no more than 2,300 mg a day (eating only 1,500 mg a day is an even better goal). Reduce saturated fat to no more than 6% of daily calories and total fat to 27% of daily calories.  Low-fat dairy products appear to be especially beneficial for lowering syst rice or whole-wheat noodles can serve as the main dish for a meal.   • Fresh or frozen vegetables are both good choices. When buying frozen and canned vegetables, choose those labeled as low sodium or without added salt.    • To increase the number of servi lactose intolerance. • Go easy on regular and even fat-free cheeses because they are typically high in sodium.    Lean meat, poultry and fish (6 or fewer servings a day)  Meat can be a rich source of protein, B vitamins, iron and zinc. But because even le benefits. Fats and oils (2 to 3 servings a day)  Fat helps your body absorb essential vitamins and helps your body's immune system. But too much fat increases your risk of heart disease, diabetes and obesity.  The DASH diet strives for a healthy balance b DASH diet: Alcohol and caffeine  Drinking too much alcohol can increase blood pressure. The DASH diet recommends that men limit alcohol to two or fewer drinks a day and women one or less. The DASH diet doesn't address caffeine consumption.  The influenc bland, gradually introduce low-sodium foods and cut back on table salt until you reach your sodium goal. That'll give your palate time to adjust. It can take several weeks for your taste buds to get used to less salty foods.      [facility] is loc notify us and stop treatment if problems arise, but know that our intention is that the benefits outweigh those potential risks and we strive to make you healthier and to improve your quality of life.     Referrals, and Radiology Information:    If your ins

## 2019-12-07 NOTE — PROGRESS NOTES
HPI:    Patient ID: Ahsan Che is a 47year old male. HPI  Mr. Corrine Abraham is a pleasant 59-year-old gentleman with history of hypertension, hyperlipidemia, prediabetes, obesity, depression, anxiety, history of alcohol dependence, tobacco dependenc food. 180 tablet 1   • topiramate 100 MG Oral Tab Take 1 tablet (100 mg total) by mouth 2 (two) times daily.  180 tablet 1   • Testosterone 30 MG/ACT Transdermal Solution APPLY 2 PUMP ACTUATIONS TOPICALLY DAILY 180 mL 1   • JARDIANCE 10 MG Oral Tab TAKE 1 T Abdominal: Soft. Bowel sounds are normal. Musculoskeletal:         General: No edema. Lymphadenopathy:     He has no cervical adenopathy. Neurological: He is alert. Psychiatric: He has a normal mood and affect.  His behavior is normal.   Vitals re

## 2019-12-13 DIAGNOSIS — R73.03 PRE-DIABETES: ICD-10-CM

## 2019-12-13 DIAGNOSIS — Z51.81 ENCOUNTER FOR THERAPEUTIC DRUG MONITORING: ICD-10-CM

## 2019-12-13 DIAGNOSIS — I10 ESSENTIAL HYPERTENSION: ICD-10-CM

## 2019-12-13 DIAGNOSIS — E66.01 SEVERE OBESITY WITH BODY MASS INDEX (BMI) OF 35.0 TO 39.9 WITH SERIOUS COMORBIDITY (HCC): ICD-10-CM

## 2019-12-13 RX ORDER — LIRAGLUTIDE 6 MG/ML
INJECTION, SOLUTION SUBCUTANEOUS
Refills: 1 | OUTPATIENT
Start: 2019-12-13

## 2019-12-13 NOTE — TELEPHONE ENCOUNTER
Requesting Lesly  LOV: 10/23/19  RTC: 3 months  Last Relevant Labs: na  Filled: 10/23/19 #15 with 1 refills    Future Appointments   Date Time Provider Alba Skinner   1/20/2020  3:20 PM TEO Call [facility] [address]     Denied refill as

## 2019-12-16 RX ORDER — LIRAGLUTIDE 6 MG/ML
INJECTION, SOLUTION SUBCUTANEOUS
Refills: 1 | OUTPATIENT
Start: 2019-12-16

## 2019-12-16 RX ORDER — SILDENAFIL CITRATE 20 MG/1
TABLET ORAL
Qty: 8 TABLET | Refills: 5 | Status: SHIPPED | OUTPATIENT
Start: 2019-12-16 | End: 2020-07-09

## 2019-12-16 NOTE — TELEPHONE ENCOUNTER
Requesting SILDENAFIL CITRATE 20 MG   LOV: 12/7/19  RTC:   Last Relevant Labs:  12/7/19  Filled: 8/15/19  # 8with 5 refills    Future Appointments   Date Time Provider Alba Skinner   1/20/2020  3:20 PM TEO Badillo [facility] [address]

## 2019-12-16 NOTE — TELEPHONE ENCOUNTER
Requesting Saxenda  LOV: 10/23/19  RTC: 3 months  Last Relevant Labs: na  Filled: 10/23/19 #15 pens with 1 refills    Future Appointments   Date Time Provider Alba Skinner   1/20/2020  3:20 PM TEO Deluna [facility] [address]     Denied because 6 months sent 10/23/19 to pharmacy requesting this refill.   Also sent note to Desi Shah

## 2020-01-09 ENCOUNTER — TELEPHONE (OUTPATIENT)
Dept: RHEUMATOLOGY | Facility: CLINIC | Age: 55
End: 2020-01-09

## 2020-01-14 ENCOUNTER — TELEPHONE (OUTPATIENT)
Dept: FAMILY MEDICINE CLINIC | Facility: CLINIC | Age: 55
End: 2020-01-14

## 2020-01-14 NOTE — TELEPHONE ENCOUNTER
PA for testosterone has been submitted through [facility].   Waiting on denial/approval

## 2020-01-15 ENCOUNTER — APPOINTMENT (OUTPATIENT)
Dept: GENERAL RADIOLOGY | Age: 55
End: 2020-01-15
Attending: EMERGENCY MEDICINE
Payer: COMMERCIAL

## 2020-01-15 ENCOUNTER — HOSPITAL ENCOUNTER (EMERGENCY)
Age: 55
Discharge: HOME | End: 2020-01-15
Attending: EMERGENCY MEDICINE
Payer: COMMERCIAL

## 2020-01-15 VITALS
DIASTOLIC BLOOD PRESSURE: 75 MMHG | HEART RATE: 78 BPM | RESPIRATION RATE: 18 BRPM | OXYGEN SATURATION: 98 % | SYSTOLIC BLOOD PRESSURE: 121 MMHG

## 2020-01-15 DIAGNOSIS — R82.90 ABNORMAL URINALYSIS: ICD-10-CM

## 2020-01-15 DIAGNOSIS — J98.01 BRONCHOSPASM: ICD-10-CM

## 2020-01-15 DIAGNOSIS — J40 BRONCHITIS: Primary | ICD-10-CM

## 2020-01-15 LAB
BILIRUB UR QL STRIP.AUTO: NEGATIVE
COLOR UR AUTO: YELLOW
GLUCOSE UR STRIP.AUTO-MCNC: >=1000 MG/DL
KETONES UR STRIP.AUTO-MCNC: NEGATIVE MG/DL
NITRITE UR QL STRIP.AUTO: POSITIVE
PH UR STRIP.AUTO: 7 [PH] (ref 4.5–8)
POCT INFLUENZA A: NEGATIVE
POCT INFLUENZA B: NEGATIVE
SP GR UR STRIP.AUTO: 1.02 (ref 1–1.03)
UROBILINOGEN UR STRIP.AUTO-MCNC: 0.2 MG/DL
WBC #/AREA URNS AUTO: >50 /HPF
WBC CLUMPS UR QL AUTO: PRESENT

## 2020-01-15 PROCEDURE — 87186 SC STD MICRODIL/AGAR DIL: CPT | Performed by: EMERGENCY MEDICINE

## 2020-01-15 PROCEDURE — 81001 URINALYSIS AUTO W/SCOPE: CPT | Performed by: EMERGENCY MEDICINE

## 2020-01-15 PROCEDURE — 71046 X-RAY EXAM CHEST 2 VIEWS: CPT | Performed by: EMERGENCY MEDICINE

## 2020-01-15 PROCEDURE — 87999 UNLISTED MICROBIOLOGY PX: CPT

## 2020-01-15 PROCEDURE — 87086 URINE CULTURE/COLONY COUNT: CPT | Performed by: EMERGENCY MEDICINE

## 2020-01-15 PROCEDURE — 87502 INFLUENZA DNA AMP PROBE: CPT | Performed by: EMERGENCY MEDICINE

## 2020-01-15 PROCEDURE — 99284 EMERGENCY DEPT VISIT MOD MDM: CPT

## 2020-01-15 PROCEDURE — 87077 CULTURE AEROBIC IDENTIFY: CPT | Performed by: EMERGENCY MEDICINE

## 2020-01-15 PROCEDURE — 94640 AIRWAY INHALATION TREATMENT: CPT

## 2020-01-15 RX ORDER — SULFAMETHOXAZOLE AND TRIMETHOPRIM 800; 160 MG/1; MG/1
1 TABLET ORAL 2 TIMES DAILY
Qty: 20 TABLET | Refills: 0 | Status: SHIPPED | OUTPATIENT
Start: 2020-01-15 | End: 2020-01-25

## 2020-01-15 RX ORDER — PREDNISONE 20 MG/1
20 TABLET ORAL 2 TIMES DAILY
Qty: 10 TABLET | Refills: 0 | Status: SHIPPED | OUTPATIENT
Start: 2020-01-15 | End: 2020-01-20 | Stop reason: ALTCHOICE

## 2020-01-15 RX ORDER — ALBUTEROL SULFATE 90 UG/1
2 AEROSOL, METERED RESPIRATORY (INHALATION) EVERY 4 HOURS PRN
Qty: 1 INHALER | Refills: 0 | Status: SHIPPED | OUTPATIENT
Start: 2020-01-15 | End: 2020-09-02

## 2020-01-15 RX ORDER — AZITHROMYCIN 250 MG/1
TABLET, FILM COATED ORAL
Qty: 1 PACKAGE | Refills: 0 | Status: SHIPPED | OUTPATIENT
Start: 2020-01-15 | End: 2020-01-15

## 2020-01-15 RX ORDER — PREDNISONE 20 MG/1
60 TABLET ORAL ONCE
Status: COMPLETED | OUTPATIENT
Start: 2020-01-15 | End: 2020-01-15

## 2020-01-15 RX ORDER — IPRATROPIUM BROMIDE AND ALBUTEROL SULFATE 2.5; .5 MG/3ML; MG/3ML
3 SOLUTION RESPIRATORY (INHALATION) ONCE
Status: COMPLETED | OUTPATIENT
Start: 2020-01-15 | End: 2020-01-15

## 2020-01-15 NOTE — ED PROVIDER NOTES
Patient Seen in: [facility] In [place]      History   Patient presents with:  Cough/URI    Stated Complaint: productive cough    HPI    Patient reports having history of pneumonia years ago.   Patient states that his pneumonia started no extraordinary adenopathy. Lungs: Clear to auscultation bilaterally except with forced expiration cough when I appreciate wheeze. No rhonchi or rales. Heart: Normal S1 and S2, without murmur or rub. Neurologic:  Mental status as above.   Patient mov choice as this will cover upper respiratory pathogens as well as UTI pathogens  I recommend a short course of steroid with close attention to his diet and blood sugars  I recommend plenty of fluids, rest, and albuterol inhaler with MDI      Disposition and

## 2020-01-17 NOTE — TELEPHONE ENCOUNTER
Phoned pt, notified of imaging result. Pt feels that it may be trigger finger again. Has had injection in the past. Will talk to his wife, and make appt. Declines appt with Dr. Sima Zee at this time. Will notify office with worsening signs or symptoms.

## 2020-01-17 NOTE — TELEPHONE ENCOUNTER
Phoned pt, pt would like MRI results. Explained to pt that he was suppose to fu with Dr. Suzanne Griffin end of January, no appt made.  Will talk to Dr. Suzanne Griffin to discuss plan of care

## 2020-01-20 ENCOUNTER — OFFICE VISIT (OUTPATIENT)
Dept: FAMILY MEDICINE CLINIC | Facility: CLINIC | Age: 55
End: 2020-01-20
Payer: COMMERCIAL

## 2020-01-20 ENCOUNTER — OFFICE VISIT (OUTPATIENT)
Dept: INTERNAL MEDICINE CLINIC | Facility: CLINIC | Age: 55
End: 2020-01-20

## 2020-01-20 VITALS
TEMPERATURE: 98 F | SYSTOLIC BLOOD PRESSURE: 120 MMHG | HEIGHT: 73 IN | HEART RATE: 80 BPM | WEIGHT: 199 LBS | BODY MASS INDEX: 26.37 KG/M2 | DIASTOLIC BLOOD PRESSURE: 60 MMHG | RESPIRATION RATE: 16 BRPM

## 2020-01-20 VITALS
SYSTOLIC BLOOD PRESSURE: 100 MMHG | BODY MASS INDEX: 28.35 KG/M2 | RESPIRATION RATE: 14 BRPM | DIASTOLIC BLOOD PRESSURE: 60 MMHG | WEIGHT: 198 LBS | HEART RATE: 66 BPM | HEIGHT: 70 IN

## 2020-01-20 DIAGNOSIS — R73.03 PRE-DIABETES: ICD-10-CM

## 2020-01-20 DIAGNOSIS — R31.9 URINARY TRACT INFECTION WITH HEMATURIA, SITE UNSPECIFIED: ICD-10-CM

## 2020-01-20 DIAGNOSIS — J40 BRONCHITIS: Primary | ICD-10-CM

## 2020-01-20 DIAGNOSIS — Z51.81 ENCOUNTER FOR THERAPEUTIC DRUG MONITORING: Primary | ICD-10-CM

## 2020-01-20 DIAGNOSIS — N39.0 URINARY TRACT INFECTION WITH HEMATURIA, SITE UNSPECIFIED: ICD-10-CM

## 2020-01-20 DIAGNOSIS — E66.01 SEVERE OBESITY WITH BODY MASS INDEX (BMI) OF 35.0 TO 39.9 WITH SERIOUS COMORBIDITY (HCC): ICD-10-CM

## 2020-01-20 DIAGNOSIS — I10 ESSENTIAL HYPERTENSION: ICD-10-CM

## 2020-01-20 PROCEDURE — 99213 OFFICE O/P EST LOW 20 MIN: CPT | Performed by: NURSE PRACTITIONER

## 2020-01-20 PROCEDURE — 99214 OFFICE O/P EST MOD 30 MIN: CPT | Performed by: FAMILY MEDICINE

## 2020-01-20 RX ORDER — PHENTERMINE HYDROCHLORIDE 37.5 MG/1
37.5 TABLET ORAL EVERY MORNING
Qty: 30 TABLET | Refills: 2 | Status: SHIPPED | OUTPATIENT
Start: 2020-01-20 | End: 2020-05-11

## 2020-01-20 NOTE — PATIENT INSTRUCTIONS
Thank you for choosing Maykel Damon MD at [place] [address]  To Do: Trisha Lockhart.  1. Please take meds as directed. [facility] is located in [address]. Monday, Tuesday & Friday – 8 a.m. to 4 p.m.   Wednesday, Thursday – 7 a.m. to outweigh those potential risks and we strive to make you healthier and to improve your quality of life.     Referrals, and Radiology Information:    If your insurance requires a referral to a specialist, please allow 5 business days to process your referral

## 2020-01-20 NOTE — PATIENT INSTRUCTIONS
Continue making lifestyle changes that focus on good nutrition, regular exercise and stress management. Medication Plan: Continue current medication regimen, aside from stop Jardiance. Agreed upon goal/s before next office visit include: Mellisa Naqvi work! to your bones, brain, skin, nerves, muscles, etc. The health and lifespan of your cells depend on the nutrients you get from food. That is why healthy food choices are so important.    Once you can start to see food as being fuel for your body, you will get [email or web address]. Personal training and Group Fitness classes with Carol Bruce [phone]  · 360[employer] Karen @ [email or web address] Group Fitness [phone] and/or email Jayro Winston at [email or web address]. "[employer]"  · Hard K

## 2020-01-20 NOTE — PROGRESS NOTES
HPI:    Patient ID: Darek Buckner. is a 47year old male. HPI  Mr. Vinh Riley is a pleasant 63-year-old gentleman with history of hypertension, hyperlipidemia, prediabetes, obesity, depression, anxiety, history of alcohol dependence, tobacco dependenc Tab Take 1 tablet (37.5 mg total) by mouth every morning. 30 tablet 2   • Liraglutide -Weight Management (SAXENDA) 18 MG/3ML Subcutaneous Solution Pen-injector Inject 3 mg into the skin daily.  15 pen 1   • metFORMIN HCl  MG Oral Tablet 24 Hr Take 2 t He has no wheezes. He has no rales. Abdominal: Soft. Bowel sounds are normal. Musculoskeletal:         General: No edema. Lymphadenopathy:     He has no cervical adenopathy. Neurological: He is alert. Psychiatric: He has a normal mood and affect.

## 2020-01-20 NOTE — PROGRESS NOTES
Cristine Brennan. is a 47year old male presents today for follow-up on medical weight loss program for the treatment of overweight, obesity, or morbid obesity with HTN, prediabetes, hyperlipidemia, JAVID.    S:  Current weight Wt Readings from Last 6 Enc well nourished, in no apparent distress, overweight  EYES: conjunctiva pink, sclera non icteric  LUNGS: coarse breath sounds, breathing non labored  CARDIO: RRR without murmur, normal S1 and S2 without clicks or gallops, no pedal edema.   GI: +BS  NEURO/MS: eggs) 2 days of the week. Additional resources for support and education below.     Re-thinking Nutrition: Learning to See Food as Fuel  October 8, 2019 • Posted in [facility] • By Your Weight Matters Campaign    “Dieting” can start to feel challenging protein and healthy fats can cause your cells to become brittle, leaky and tired. When cells can't do what they are designed to do, problems like inflammation, cancer and other difficult health conditions start to arise.   Foods that Support Healthy Cells: series www.sitandbefit. org      Apps for on the [place]  · [employer] - on the go group exercise  · [employer] 7 Minute Workout - free on the go HIIT training  · 5 Minute Kitchen Workout [email or web address]    Nutrition Trackers and To

## 2020-02-11 DIAGNOSIS — E66.01 SEVERE OBESITY WITH BODY MASS INDEX (BMI) OF 35.0 TO 39.9 WITH SERIOUS COMORBIDITY (HCC): ICD-10-CM

## 2020-02-11 DIAGNOSIS — I10 ESSENTIAL HYPERTENSION: ICD-10-CM

## 2020-02-11 DIAGNOSIS — Z51.81 ENCOUNTER FOR THERAPEUTIC DRUG MONITORING: ICD-10-CM

## 2020-02-11 DIAGNOSIS — R73.03 PRE-DIABETES: ICD-10-CM

## 2020-02-11 NOTE — TELEPHONE ENCOUNTER
Requesting   Requested Prescriptions     Pending Prescriptions Disp Refills   • Liraglutide -Weight Management 18 MG/3ML Subcutaneous Solution Pen-injector [Pharmacy Med Name: SAXENDA 18 MG/3 ML PEN] 15 pen 1     Sig: INJECT 3 MG INTO THE SKIN DAILY.      L

## 2020-02-26 RX ORDER — TESTOSTERONE 30 MG/1.5ML
SOLUTION TOPICAL
Qty: 180 ML | Refills: 1 | Status: SHIPPED | OUTPATIENT
Start: 2020-02-26 | End: 2020-07-09

## 2020-02-26 NOTE — TELEPHONE ENCOUNTER
APPROVE/DENY set up RX for Testosterone.     10/21/19 last refill 180ml (1)  1/20/20 OV Dr. Jennifer Givens Bronchitis/UTI      Component      Latest Ref Rng & Units 12/7/2019   TESTOSTERONE      86.98 - 780.10 ng/dL 842.65 (H)

## 2020-03-04 RX ORDER — FEBUXOSTAT 40 MG/1
TABLET, FILM COATED ORAL
Qty: 90 TABLET | Refills: 1 | Status: SHIPPED | OUTPATIENT
Start: 2020-03-04 | End: 2020-09-24

## 2020-03-04 NOTE — TELEPHONE ENCOUNTER
Requesting FEBUXOSTAT 40 MG   LOV: 1/20/20  RTC:   Last Relevant Labs: 12/7/19  Filled: 9/11/19 # 90 with 1 refills    No future appointments.

## 2020-04-25 DIAGNOSIS — R73.03 PRE-DIABETES: ICD-10-CM

## 2020-04-25 DIAGNOSIS — Z51.81 ENCOUNTER FOR THERAPEUTIC DRUG MONITORING: ICD-10-CM

## 2020-04-25 DIAGNOSIS — E66.01 SEVERE OBESITY WITH BODY MASS INDEX (BMI) OF 35.0 TO 39.9 WITH SERIOUS COMORBIDITY (HCC): ICD-10-CM

## 2020-04-27 NOTE — TELEPHONE ENCOUNTER
Requesting   Requested Prescriptions     Pending Prescriptions Disp Refills   • TOPIRAMATE 100 MG Oral Tab [Pharmacy Med Name: TOPIRAMATE 100 MG TABLET] 180 tablet 1     Sig: TAKE 1 TABLET BY MOUTH TWICE A DAY   • METFORMIN HCL  MG Oral Tablet 24 Hr

## 2020-04-29 RX ORDER — TOPIRAMATE 100 MG/1
TABLET, FILM COATED ORAL
Qty: 180 TABLET | Refills: 0 | Status: SHIPPED | OUTPATIENT
Start: 2020-04-29 | End: 2020-07-29

## 2020-04-29 RX ORDER — METFORMIN HYDROCHLORIDE 750 MG/1
1500 TABLET, EXTENDED RELEASE ORAL
Qty: 180 TABLET | Refills: 0 | Status: SHIPPED | OUTPATIENT
Start: 2020-04-29 | End: 2020-07-13

## 2020-05-11 ENCOUNTER — VIRTUAL PHONE E/M (OUTPATIENT)
Dept: INTERNAL MEDICINE CLINIC | Facility: CLINIC | Age: 55
End: 2020-05-11
Payer: COMMERCIAL

## 2020-05-11 DIAGNOSIS — I10 ESSENTIAL HYPERTENSION: ICD-10-CM

## 2020-05-11 DIAGNOSIS — R73.03 PRE-DIABETES: ICD-10-CM

## 2020-05-11 DIAGNOSIS — Z51.81 ENCOUNTER FOR THERAPEUTIC DRUG MONITORING: Primary | ICD-10-CM

## 2020-05-11 DIAGNOSIS — E66.01 SEVERE OBESITY WITH BODY MASS INDEX (BMI) OF 35.0 TO 39.9 WITH SERIOUS COMORBIDITY (HCC): ICD-10-CM

## 2020-05-11 PROCEDURE — 99213 OFFICE O/P EST LOW 20 MIN: CPT | Performed by: NURSE PRACTITIONER

## 2020-05-11 RX ORDER — PHENTERMINE HYDROCHLORIDE 37.5 MG/1
37.5 TABLET ORAL EVERY MORNING
Qty: 30 TABLET | Refills: 2 | Status: SHIPPED | OUTPATIENT
Start: 2020-05-11 | End: 2020-08-12

## 2020-05-11 NOTE — PATIENT INSTRUCTIONS
Thank you for taking the time for our virtual encounter today! Here are the next steps and plans we discussed:    1. Continue your medications, no changes.   2. Begin to develop a routine of daily exercise as tolerated, considering at home options like Pelo but not many nutrients. A small amount of these toppings is OK. But the more you add, the more fat you add, too. · Frozen vegetables that come with cheese sauce or other processed flavoring are high in fat and salt.  It's healthier to season plain frozen v

## 2020-05-11 NOTE — PROGRESS NOTES
Virtual Telephone Check-In    Corrinne Farrier. verbally consents to a Virtual/Telephone Check-In visit on 5/11/2020.     Patient understands and accepts financial responsibility for any deductible, co-insurance and/or co-pays associated with this servic Inject 3 mg into the skin daily.  -     Empagliflozin (JARDIANCE) 10 MG Oral Tab; Take 1 tablet by mouth daily.     Severe obesity with body mass index (BMI) of 35.0 to 39.9 with serious comorbidity (HCC)  - CPM, goal to maintain current weight and discuss

## 2020-05-21 RX ORDER — NIFEDIPINE 60 MG/1
TABLET, FILM COATED, EXTENDED RELEASE ORAL
Qty: 90 TABLET | Refills: 1 | Status: SHIPPED | OUTPATIENT
Start: 2020-05-21 | End: 2020-11-05

## 2020-05-21 NOTE — TELEPHONE ENCOUNTER
Hypertension Medications Protocol Passed5/21 10:23 AM   CMP or BMP in past 12 months    Last serum creatinine< 2.0    Appointment in past 6 or next 3 months     Requesting NIFEDIPINE ER 60 MG   LOV: 1/20/20  RTC:   Last Relevant Labs: 8/2/19  Filled: 12/4/

## 2020-07-09 RX ORDER — TESTOSTERONE 30 MG/1.5ML
SOLUTION TOPICAL
Qty: 180 ML | Refills: 1 | Status: SHIPPED | OUTPATIENT
Start: 2020-07-09 | End: 2022-02-07

## 2020-07-09 RX ORDER — SILDENAFIL CITRATE 20 MG/1
TABLET ORAL
Qty: 8 TABLET | Refills: 5 | Status: SHIPPED | OUTPATIENT
Start: 2020-07-09 | End: 2020-11-09

## 2020-07-09 NOTE — TELEPHONE ENCOUNTER
Requesting SILDENAFIL CITRATE 20 MG, TESTOSTERONE 30 MG/ACT   LOV: 1/20/20  RTC:   Last Relevant Labs: 12/7/19      Future Appointments   Date Time Provider Alba Skinner   8/11/2020  3:40 PM TEO Boone [facility]     TESTOSTERONE

## 2020-07-10 DIAGNOSIS — R73.03 PRE-DIABETES: ICD-10-CM

## 2020-07-10 DIAGNOSIS — Z51.81 ENCOUNTER FOR THERAPEUTIC DRUG MONITORING: ICD-10-CM

## 2020-07-10 DIAGNOSIS — E66.01 SEVERE OBESITY WITH BODY MASS INDEX (BMI) OF 35.0 TO 39.9 WITH SERIOUS COMORBIDITY (HCC): ICD-10-CM

## 2020-07-13 RX ORDER — METFORMIN HYDROCHLORIDE 750 MG/1
1500 TABLET, EXTENDED RELEASE ORAL
Qty: 180 TABLET | Refills: 0 | OUTPATIENT
Start: 2020-07-13

## 2020-07-14 RX ORDER — VARENICLINE TARTRATE 1 MG/1
1 TABLET, FILM COATED ORAL 2 TIMES DAILY
Qty: 60 TABLET | Refills: 5 | Status: SHIPPED | OUTPATIENT
Start: 2020-07-14 | End: 2021-02-24

## 2020-07-14 NOTE — TELEPHONE ENCOUNTER
Patient is asking for a refill on chantix. He got starter pack last [facility] from [facility].   Pended if ok to order

## 2020-07-27 DIAGNOSIS — Z51.81 ENCOUNTER FOR THERAPEUTIC DRUG MONITORING: ICD-10-CM

## 2020-07-27 DIAGNOSIS — E66.01 SEVERE OBESITY WITH BODY MASS INDEX (BMI) OF 35.0 TO 39.9 WITH SERIOUS COMORBIDITY (HCC): ICD-10-CM

## 2020-07-27 DIAGNOSIS — R73.03 PRE-DIABETES: ICD-10-CM

## 2020-07-27 DIAGNOSIS — I10 ESSENTIAL HYPERTENSION: ICD-10-CM

## 2020-07-28 DIAGNOSIS — Z51.81 ENCOUNTER FOR THERAPEUTIC DRUG MONITORING: ICD-10-CM

## 2020-07-28 DIAGNOSIS — R73.03 PRE-DIABETES: ICD-10-CM

## 2020-07-28 DIAGNOSIS — E66.01 SEVERE OBESITY WITH BODY MASS INDEX (BMI) OF 35.0 TO 39.9 WITH SERIOUS COMORBIDITY (HCC): ICD-10-CM

## 2020-07-28 DIAGNOSIS — I10 ESSENTIAL HYPERTENSION: ICD-10-CM

## 2020-07-28 NOTE — TELEPHONE ENCOUNTER
Requesting Lesly  LOV: 5/11/20  RTC: 3 months  Last Relevant Labs: na  Filled: 5/11/20 #15 pens with 1 refills    Future Appointments   Date Time Provider Alba Skinner   8/12/2020  3:40 PM TEO Caldera [facility]     6 month RX se

## 2020-07-29 RX ORDER — TOPIRAMATE 100 MG/1
TABLET, FILM COATED ORAL
Qty: 180 TABLET | Refills: 0 | Status: SHIPPED | OUTPATIENT
Start: 2020-07-29 | End: 2020-08-12

## 2020-07-29 NOTE — TELEPHONE ENCOUNTER
Requesting Saxenda and topiramate  LOV: 5/11/20  RTC: 3 months  Last Relevant Labs: na  Filled: 5/11/20 #15 pens with 1 refills  Saxenda  Filled: 4/29/20 #180 with 0 refills  Topamax    Future Appointments   Date Time Provider Alba Skinner   8/12/2020

## 2020-08-12 ENCOUNTER — OFFICE VISIT (OUTPATIENT)
Dept: INTERNAL MEDICINE CLINIC | Facility: CLINIC | Age: 55
End: 2020-08-12
Payer: COMMERCIAL

## 2020-08-12 VITALS
WEIGHT: 200 LBS | HEIGHT: 70 IN | SYSTOLIC BLOOD PRESSURE: 136 MMHG | HEART RATE: 78 BPM | DIASTOLIC BLOOD PRESSURE: 80 MMHG | TEMPERATURE: 98 F | RESPIRATION RATE: 16 BRPM | BODY MASS INDEX: 28.63 KG/M2

## 2020-08-12 DIAGNOSIS — I10 ESSENTIAL HYPERTENSION: ICD-10-CM

## 2020-08-12 DIAGNOSIS — E66.01 SEVERE OBESITY WITH BODY MASS INDEX (BMI) OF 35.0 TO 39.9 WITH SERIOUS COMORBIDITY (HCC): ICD-10-CM

## 2020-08-12 DIAGNOSIS — R73.03 PRE-DIABETES: ICD-10-CM

## 2020-08-12 DIAGNOSIS — Z51.81 ENCOUNTER FOR THERAPEUTIC DRUG MONITORING: Primary | ICD-10-CM

## 2020-08-12 PROCEDURE — 3008F BODY MASS INDEX DOCD: CPT | Performed by: NURSE PRACTITIONER

## 2020-08-12 PROCEDURE — 3079F DIAST BP 80-89 MM HG: CPT | Performed by: NURSE PRACTITIONER

## 2020-08-12 PROCEDURE — 99213 OFFICE O/P EST LOW 20 MIN: CPT | Performed by: NURSE PRACTITIONER

## 2020-08-12 PROCEDURE — 3075F SYST BP GE 130 - 139MM HG: CPT | Performed by: NURSE PRACTITIONER

## 2020-08-12 RX ORDER — TOPIRAMATE 50 MG/1
50 TABLET, FILM COATED ORAL 2 TIMES DAILY
Qty: 180 TABLET | Refills: 1 | Status: SHIPPED | OUTPATIENT
Start: 2020-08-12 | End: 2021-02-06

## 2020-08-12 RX ORDER — PHENTERMINE HYDROCHLORIDE 37.5 MG/1
37.5 TABLET ORAL EVERY MORNING
Qty: 30 TABLET | Refills: 2 | Status: SHIPPED | OUTPATIENT
Start: 2020-08-12 | End: 2020-11-12

## 2020-08-12 NOTE — PROGRESS NOTES
Chantale Ann. is a 54year old male presents today for follow-up on medical weight loss program for the treatment of overweight, obesity, or morbid obesity with HTN, prediabetes, hyperlipidemia, JAVID.    S:  Current weight Wt Readings from Last 6 Enc icteric  LUNGS: CTA, breathing non labored  CARDIO: RRR without murmur, normal S1 and S2 without clicks or gallops, no pedal edema.   GI: +BS  NEURO/MS: motor and sensory grossly intact  PSYCH: pleasant, cooperative, normal mood    ASSESSMENT AND PLAN:  Enc To  The psychology and biology of stress-related overeating and weight gain   Emotional Eating under Stress  Have you ever found yourself mindlessly eating a tub of ice cream while you brood about your latest romantic rejection or eating a hamburger and fr the deadline, does not work off much energy at all dealing with the stressor! Unfortunately, we are stuck with a neuroendocrine system that didn’t get the update, so your brain is still going to tell you to reach for that plate of cookies anyway.   Luke Delgado stress. Being a couch potato also increases the temptation to overeat and is inactive, which means that those extra calories aren’t getting burned off.  Anxiety can also make you eat more “mindlessly” as you churn around worrying thoughts in your head, not insomnia. Our minds are overactive and won’t switch off. We may also lose sleep because of pulling overnights to cram for exams or writing until the early hours. Stress causes decreased blood sugar, which leads to fatigue.  If you drink coffee or caffeinate relieve stress without adding on the pounds.  Although you may feel that you don’t have time for leisure activities with looming deadlines, taking time to relieve stress helps you to feel refreshed, lets you think more clearly, and improves your mood, so yo

## 2020-08-12 NOTE — PATIENT INSTRUCTIONS
Continue making lifestyle changes that focus on good nutrition, regular exercise and stress management. Medication Plan: Continue current medication regimen, aside from reduce Topamax to 50 mg twice a day.     Agreed upon goal/s before next office visit from the internal organs and to your large muscles to prepare for “fight or flight.” However, once the effects of adrenaline wear off, cortisol, known as the “stress hormone,” hangs around and starts signaling the body to replenish your food supply.  Otoniel Hutchinson may burn off some extra calories fidgeting or running around cleaning because we can’t sit still, anxiety can also trigger “emotional eating.” Overeating or eating unhealthy foods in response to stress or as a way to calm down is a very common response.  In laboratory mice, that being “stressed” by exposure to the smell of a predator lead the mice to eat more high-fat food pellets, when given the choice of eating these instead of normal feed.   Less Sleep  Do you ever lie awake at night worrying about paying t also learn to tune into your subjective feelings of hunger or fullness, rather than eating just because it’s a mealtime or because there is food in front of you.  A well-designed study of binge-eaters showed that participating in a Mindful Eating program le

## 2020-09-02 ENCOUNTER — OFFICE VISIT (OUTPATIENT)
Dept: FAMILY MEDICINE CLINIC | Facility: CLINIC | Age: 55
End: 2020-09-02
Payer: COMMERCIAL

## 2020-09-02 VITALS
HEIGHT: 70 IN | TEMPERATURE: 97 F | SYSTOLIC BLOOD PRESSURE: 130 MMHG | HEART RATE: 72 BPM | OXYGEN SATURATION: 98 % | WEIGHT: 204 LBS | DIASTOLIC BLOOD PRESSURE: 70 MMHG | BODY MASS INDEX: 29.2 KG/M2 | RESPIRATION RATE: 16 BRPM

## 2020-09-02 DIAGNOSIS — I10 ESSENTIAL HYPERTENSION: ICD-10-CM

## 2020-09-02 DIAGNOSIS — F33.0 DEPRESSION, MAJOR, RECURRENT, MILD (HCC): ICD-10-CM

## 2020-09-02 DIAGNOSIS — E78.00 HIGH CHOLESTEROL: ICD-10-CM

## 2020-09-02 DIAGNOSIS — E78.1 HYPERTRIGLYCERIDEMIA: ICD-10-CM

## 2020-09-02 DIAGNOSIS — M65.342 TRIGGER RING FINGER OF LEFT HAND: ICD-10-CM

## 2020-09-02 DIAGNOSIS — Z00.00 LABORATORY EXAM ORDERED AS PART OF ROUTINE GENERAL MEDICAL EXAMINATION: ICD-10-CM

## 2020-09-02 DIAGNOSIS — R73.03 PRE-DIABETES: ICD-10-CM

## 2020-09-02 DIAGNOSIS — Z12.5 SCREENING FOR MALIGNANT NEOPLASM OF PROSTATE: ICD-10-CM

## 2020-09-02 DIAGNOSIS — Z00.00 WELLNESS EXAMINATION: Primary | ICD-10-CM

## 2020-09-02 DIAGNOSIS — E29.1 HYPOGONADISM MALE: ICD-10-CM

## 2020-09-02 DIAGNOSIS — E78.2 MIXED HYPERLIPIDEMIA: ICD-10-CM

## 2020-09-02 DIAGNOSIS — Z85.46 HISTORY OF PROSTATE CANCER: ICD-10-CM

## 2020-09-02 PROCEDURE — 3078F DIAST BP <80 MM HG: CPT | Performed by: FAMILY MEDICINE

## 2020-09-02 PROCEDURE — 3075F SYST BP GE 130 - 139MM HG: CPT | Performed by: FAMILY MEDICINE

## 2020-09-02 PROCEDURE — 3008F BODY MASS INDEX DOCD: CPT | Performed by: FAMILY MEDICINE

## 2020-09-02 PROCEDURE — 99396 PREV VISIT EST AGE 40-64: CPT | Performed by: FAMILY MEDICINE

## 2020-09-02 PROCEDURE — 99214 OFFICE O/P EST MOD 30 MIN: CPT | Performed by: FAMILY MEDICINE

## 2020-09-02 RX ORDER — ALPRAZOLAM 0.25 MG/1
0.25 TABLET ORAL DAILY PRN
Qty: 30 TABLET | Refills: 1 | Status: SHIPPED | OUTPATIENT
Start: 2020-09-02 | End: 2020-11-09

## 2020-09-02 RX ORDER — FLUOXETINE HYDROCHLORIDE 40 MG/1
40 CAPSULE ORAL DAILY
Qty: 90 CAPSULE | Refills: 1 | Status: SHIPPED | OUTPATIENT
Start: 2020-09-02 | End: 2021-02-03

## 2020-09-02 NOTE — PATIENT INSTRUCTIONS
Thank you for choosing Yana Kidd MD at [place] [address]  To Do: Inge Graves.  1. Please see age appropriate health prevention below    THE [facility] is located in [address]. Monday, Tuesday & Friday – 8 a.m. to 4 p.m.   Wednesday, that the benefits outweigh those potential risks and we strive to make you healthier and to improve your quality of life.     Referrals, and Radiology Information:    If your insurance requires a referral to a specialist, please allow 5 business days to pro age group  Yearly checkup if your blood pressure is normal   Normal blood pressure is less than 120/80 mm Hg   If your blood pressure reading is higher than normal, follow the advice of your healthcare provider    Colorectal cancer All men at average risk or persons who have quit within past 15 years, and have a 30-pack-year smoking history (Eligibility criteria may vary across major organizations; Age limit may extend to age [de-identified].)   Talk with your healthcare provider for more information.   Yearly lung cance first dose.     Haemophilus influenzae Type B (HIB)  Men at increased risk for infection – talk with your healthcare provider  1 to 3 doses   Influenza (flu) All men in this age group  Once a year   Measles, mumps, rubella (MMR)  Men in this age group throu healthcare provider  At routine exams   Daily low-dose aspirin  Men ages 36 to 79 years who are not at increased risk of bleeding and are at high risk of cardiovascular disease 4; talk with your healthcare provider  At routine exams   Use of statins Men ag
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
## 2020-09-08 ENCOUNTER — LAB ENCOUNTER (OUTPATIENT)
Dept: LAB | Age: 55
End: 2020-09-08
Attending: FAMILY MEDICINE
Payer: COMMERCIAL

## 2020-09-08 DIAGNOSIS — I10 ESSENTIAL HYPERTENSION: ICD-10-CM

## 2020-09-08 DIAGNOSIS — E78.1 HYPERTRIGLYCERIDEMIA: ICD-10-CM

## 2020-09-08 DIAGNOSIS — E78.00 HIGH CHOLESTEROL: ICD-10-CM

## 2020-09-08 DIAGNOSIS — Z12.5 SCREENING FOR MALIGNANT NEOPLASM OF PROSTATE: ICD-10-CM

## 2020-09-08 DIAGNOSIS — E78.2 MIXED HYPERLIPIDEMIA: ICD-10-CM

## 2020-09-08 DIAGNOSIS — R73.03 PRE-DIABETES: ICD-10-CM

## 2020-09-08 DIAGNOSIS — E29.1 HYPOGONADISM MALE: ICD-10-CM

## 2020-09-08 DIAGNOSIS — Z00.00 LABORATORY EXAM ORDERED AS PART OF ROUTINE GENERAL MEDICAL EXAMINATION: ICD-10-CM

## 2020-09-08 DIAGNOSIS — Z85.46 HISTORY OF PROSTATE CANCER: ICD-10-CM

## 2020-09-08 LAB
ALBUMIN SERPL-MCNC: 4 G/DL (ref 3.4–5)
ALBUMIN/GLOB SERPL: 1 {RATIO} (ref 1–2)
ALP LIVER SERPL-CCNC: 53 U/L (ref 45–117)
ALT SERPL-CCNC: 24 U/L (ref 16–61)
ANION GAP SERPL CALC-SCNC: 4 MMOL/L (ref 0–18)
AST SERPL-CCNC: 12 U/L (ref 15–37)
BASOPHILS # BLD AUTO: 0.08 X10(3) UL (ref 0–0.2)
BASOPHILS NFR BLD AUTO: 1 %
BILIRUB SERPL-MCNC: 0.6 MG/DL (ref 0.1–2)
BUN BLD-MCNC: 9 MG/DL (ref 7–18)
BUN/CREAT SERPL: 8.2 (ref 10–20)
CALCIUM BLD-MCNC: 9.3 MG/DL (ref 8.5–10.1)
CHLORIDE SERPL-SCNC: 108 MMOL/L (ref 98–112)
CHOLEST SMN-MCNC: 218 MG/DL (ref ?–200)
CO2 SERPL-SCNC: 26 MMOL/L (ref 21–32)
COMPLEXED PSA SERPL-MCNC: 1.69 NG/ML (ref ?–4)
CREAT BLD-MCNC: 1.1 MG/DL (ref 0.7–1.3)
DEPRECATED RDW RBC AUTO: 42.5 FL (ref 35.1–46.3)
EOSINOPHIL # BLD AUTO: 0.16 X10(3) UL (ref 0–0.7)
EOSINOPHIL NFR BLD AUTO: 2 %
ERYTHROCYTE [DISTWIDTH] IN BLOOD BY AUTOMATED COUNT: 13 % (ref 11–15)
EST. AVERAGE GLUCOSE BLD GHB EST-MCNC: 117 MG/DL (ref 68–126)
ESTRADIOL SERPL-MCNC: 24 PG/ML (ref 11–52.5)
GLOBULIN PLAS-MCNC: 3.9 G/DL (ref 2.8–4.4)
GLUCOSE BLD-MCNC: 103 MG/DL (ref 70–99)
HBA1C MFR BLD HPLC: 5.7 % (ref ?–5.7)
HCT VFR BLD AUTO: 54.7 % (ref 39–53)
HDLC SERPL-MCNC: 42 MG/DL (ref 40–59)
HGB BLD-MCNC: 17.4 G/DL (ref 13–17.5)
IMM GRANULOCYTES # BLD AUTO: 0.01 X10(3) UL (ref 0–1)
IMM GRANULOCYTES NFR BLD: 0.1 %
LDLC SERPL CALC-MCNC: 153 MG/DL (ref ?–100)
LYMPHOCYTES # BLD AUTO: 2.03 X10(3) UL (ref 1–4)
LYMPHOCYTES NFR BLD AUTO: 25.5 %
M PROTEIN MFR SERPL ELPH: 7.9 G/DL (ref 6.4–8.2)
MCH RBC QN AUTO: 28.7 PG (ref 26–34)
MCHC RBC AUTO-ENTMCNC: 31.8 G/DL (ref 31–37)
MCV RBC AUTO: 90.1 FL (ref 80–100)
MONOCYTES # BLD AUTO: 0.51 X10(3) UL (ref 0.1–1)
MONOCYTES NFR BLD AUTO: 6.4 %
NEUTROPHILS # BLD AUTO: 5.16 X10 (3) UL (ref 1.5–7.7)
NEUTROPHILS # BLD AUTO: 5.16 X10(3) UL (ref 1.5–7.7)
NEUTROPHILS NFR BLD AUTO: 65 %
NONHDLC SERPL-MCNC: 176 MG/DL (ref ?–130)
OSMOLALITY SERPL CALC.SUM OF ELEC: 285 MOSM/KG (ref 275–295)
PATIENT FASTING Y/N/NP: YES
PATIENT FASTING Y/N/NP: YES
PLATELET # BLD AUTO: 224 10(3)UL (ref 150–450)
POTASSIUM SERPL-SCNC: 3.6 MMOL/L (ref 3.5–5.1)
RBC # BLD AUTO: 6.07 X10(6)UL (ref 4.3–5.7)
SODIUM SERPL-SCNC: 138 MMOL/L (ref 136–145)
TRIGL SERPL-MCNC: 115 MG/DL (ref 30–149)
VLDLC SERPL CALC-MCNC: 23 MG/DL (ref 0–30)
WBC # BLD AUTO: 8 X10(3) UL (ref 4–11)

## 2020-09-08 PROCEDURE — 36415 COLL VENOUS BLD VENIPUNCTURE: CPT | Performed by: FAMILY MEDICINE

## 2020-09-08 PROCEDURE — 82670 ASSAY OF TOTAL ESTRADIOL: CPT | Performed by: FAMILY MEDICINE

## 2020-09-08 PROCEDURE — 84402 ASSAY OF FREE TESTOSTERONE: CPT | Performed by: FAMILY MEDICINE

## 2020-09-08 PROCEDURE — 84403 ASSAY OF TOTAL TESTOSTERONE: CPT | Performed by: FAMILY MEDICINE

## 2020-09-08 PROCEDURE — 83036 HEMOGLOBIN GLYCOSYLATED A1C: CPT | Performed by: FAMILY MEDICINE

## 2020-09-08 PROCEDURE — 85025 COMPLETE CBC W/AUTO DIFF WBC: CPT | Performed by: FAMILY MEDICINE

## 2020-09-08 PROCEDURE — 80061 LIPID PANEL: CPT | Performed by: FAMILY MEDICINE

## 2020-09-08 PROCEDURE — 80053 COMPREHEN METABOLIC PANEL: CPT | Performed by: FAMILY MEDICINE

## 2020-09-08 PROCEDURE — G0103 PSA SCREENING: HCPCS | Performed by: FAMILY MEDICINE

## 2020-09-08 RX ORDER — FLUOXETINE HYDROCHLORIDE 40 MG/1
CAPSULE ORAL
Qty: 90 CAPSULE | Refills: 1 | OUTPATIENT
Start: 2020-09-08

## 2020-09-14 LAB
TESTOSTERONE TOTAL: 462 NG/DL
TESTOSTERONE, FREE -MS/MS: 51.6 PG/ML

## 2020-09-15 NOTE — TELEPHONE ENCOUNTER
Requesting Pen needles  LOV: 8/12/20  RTC: 3 months  Last Relevant Labs: na  Filled: 7/31/19 #90 with 1 refills    Future Appointments   Date Time Provider Alba Skinner   9/22/2020  3:20 PM Keith Peterson MD [facility] NP [facility]   11/11/2020  3:

## 2020-09-22 PROBLEM — M65.322 TRIGGER INDEX FINGER OF LEFT HAND: Status: ACTIVE | Noted: 2020-09-22

## 2020-09-24 RX ORDER — FEBUXOSTAT 40 MG/1
TABLET, FILM COATED ORAL
Qty: 90 TABLET | Refills: 1 | Status: SHIPPED | OUTPATIENT
Start: 2020-09-24 | End: 2021-03-15

## 2020-09-24 NOTE — TELEPHONE ENCOUNTER
Requesting FEBUXOSTAT 40 MG   LOV: 9/2/20  RTC:   Last Relevant Labs: 9/8/20  Filled: 3/4/20 # 90 with 1 refills    Future Appointments   Date Time Provider Alba Skinner   11/11/2020  3:40 PM TEO Galindo [facility]

## 2020-10-04 NOTE — TELEPHONE ENCOUNTER
Requesting Metformin 500 mg  LOV: 8/12/20  RTC: 3 months  Last Relevant Labs: 9/8/20  Filled: 7/13/20 #180 with 0 refills    Future Appointments   Date Time Provider Alba Skinner   11/11/2020  3:40 PM TEO Darnell [facility]     pen

## 2020-10-19 DIAGNOSIS — E66.01 SEVERE OBESITY WITH BODY MASS INDEX (BMI) OF 35.0 TO 39.9 WITH SERIOUS COMORBIDITY (HCC): ICD-10-CM

## 2020-10-19 DIAGNOSIS — R73.03 PRE-DIABETES: ICD-10-CM

## 2020-10-19 DIAGNOSIS — Z51.81 ENCOUNTER FOR THERAPEUTIC DRUG MONITORING: ICD-10-CM

## 2020-10-19 DIAGNOSIS — I10 ESSENTIAL HYPERTENSION: ICD-10-CM

## 2020-10-19 RX ORDER — EMPAGLIFLOZIN 10 MG/1
TABLET, FILM COATED ORAL
Qty: 90 TABLET | Refills: 1 | OUTPATIENT
Start: 2020-10-19

## 2020-10-19 NOTE — TELEPHONE ENCOUNTER
Requesting Jardiance 10 mg  LOV: 8/12/20  RTC: 3 months  Last Relevant Labs: 9/8/20  Filled: 5/11/20 #90 with 1 refills    Future Appointments   Date Time Provider Alba Skinner   11/11/2020  3:40 PM TEO Oquendo [facility] regan Joyner

## 2020-11-05 RX ORDER — NIFEDIPINE 60 MG/1
TABLET, FILM COATED, EXTENDED RELEASE ORAL
Qty: 90 TABLET | Refills: 1 | Status: SHIPPED | OUTPATIENT
Start: 2020-11-05 | End: 2021-04-30

## 2020-11-05 NOTE — TELEPHONE ENCOUNTER
Hypertension Medications Protocol Idiyco6711/05/2020 03:05 AM   CMP or BMP in past 12 months Protocol Details    Last serum creatinine< 2.0     Appointment in past 6 or next 3 months      Refill protocol passed because the patient met the following protocol

## 2020-11-09 RX ORDER — SILDENAFIL CITRATE 20 MG/1
TABLET ORAL
Qty: 8 TABLET | Refills: 5 | Status: SHIPPED | OUTPATIENT
Start: 2020-11-09 | End: 2021-03-23

## 2020-11-09 RX ORDER — ALPRAZOLAM 0.25 MG/1
0.25 TABLET ORAL DAILY PRN
Qty: 30 TABLET | Refills: 1 | Status: SHIPPED | OUTPATIENT
Start: 2020-11-09 | End: 2021-02-20

## 2020-11-09 NOTE — TELEPHONE ENCOUNTER
Requesting ALPRAZOLAM 0.25 MG, SILDENAFIL CITRATE 20 MG   LOV: 9/2/20  RTC:   Last Relevant Labs: 9/8/20  Filled: 9/2/20 # 30 with 1 refills    Future Appointments   Date Time Provider Alba Skinner   11/11/2020  3:40 PM TEO Bowers

## 2020-11-12 DIAGNOSIS — Z51.81 ENCOUNTER FOR THERAPEUTIC DRUG MONITORING: ICD-10-CM

## 2020-11-12 DIAGNOSIS — E66.01 SEVERE OBESITY WITH BODY MASS INDEX (BMI) OF 35.0 TO 39.9 WITH SERIOUS COMORBIDITY (HCC): ICD-10-CM

## 2020-11-12 DIAGNOSIS — I10 ESSENTIAL HYPERTENSION: ICD-10-CM

## 2020-11-12 DIAGNOSIS — R73.03 PRE-DIABETES: ICD-10-CM

## 2020-11-12 NOTE — TELEPHONE ENCOUNTER
Requesting Phentermine  LOV: 8/12/20  RTC: 3 months  Last Relevant Labs: na  Filled: 8/12/20 #30 with 2 refills  Last filled 10/11/20 #30 for 30 days    Future Appointments   Date Time Provider Alba Skinner   11/23/2020 11:00 AM TEO Berumen [facility]     Will be out before seen. Pended if you would like to fill.

## 2020-11-12 NOTE — TELEPHONE ENCOUNTER
Requesting Lesly  LOV: 8/12/20  RTC: 3 months  Last Relevant Labs: na  Filled: 5/11/20 #15 pens with 1 refills    Future Appointments   Date Time Provider Alba Skinner   11/23/2020 11:00 AM TEO Dill [facility] [address]     Pended as he

## 2020-11-13 RX ORDER — PHENTERMINE HYDROCHLORIDE 37.5 MG/1
37.5 TABLET ORAL EVERY MORNING
Qty: 30 TABLET | Refills: 0 | Status: SHIPPED | OUTPATIENT
Start: 2020-11-13 | End: 2020-11-23

## 2020-11-13 RX ORDER — LIRAGLUTIDE 6 MG/ML
INJECTION, SOLUTION SUBCUTANEOUS
Qty: 45 ML | Refills: 1 | Status: SHIPPED | OUTPATIENT
Start: 2020-11-13 | End: 2021-04-30

## 2020-11-23 ENCOUNTER — TELEMEDICINE (OUTPATIENT)
Dept: INTERNAL MEDICINE CLINIC | Facility: CLINIC | Age: 55
End: 2020-11-23

## 2020-11-23 DIAGNOSIS — Z51.81 ENCOUNTER FOR THERAPEUTIC DRUG MONITORING: Primary | ICD-10-CM

## 2020-11-23 DIAGNOSIS — R73.03 PRE-DIABETES: ICD-10-CM

## 2020-11-23 DIAGNOSIS — I10 ESSENTIAL HYPERTENSION: ICD-10-CM

## 2020-11-23 DIAGNOSIS — E66.01 SEVERE OBESITY WITH BODY MASS INDEX (BMI) OF 35.0 TO 39.9 WITH SERIOUS COMORBIDITY (HCC): ICD-10-CM

## 2020-11-23 PROCEDURE — 99213 OFFICE O/P EST LOW 20 MIN: CPT | Performed by: NURSE PRACTITIONER

## 2020-11-23 RX ORDER — EMPAGLIFLOZIN 10 MG/1
1 TABLET, FILM COATED ORAL DAILY
Qty: 90 TABLET | Refills: 1 | Status: SHIPPED | OUTPATIENT
Start: 2020-11-23 | End: 2021-05-20

## 2020-11-23 RX ORDER — PHENTERMINE HYDROCHLORIDE 37.5 MG/1
37.5 TABLET ORAL EVERY MORNING
Qty: 30 TABLET | Refills: 2 | Status: SHIPPED | OUTPATIENT
Start: 2020-11-23 | End: 2021-02-24

## 2020-11-23 NOTE — PROGRESS NOTES
[facility] Weight Management follow up via video visit:    Subjective    This visit is conducted using Telemedicine with live, interactive video and audio.     Chief Complaint:  Follow up visit for lifestyle and medical management for overweight, Dylon Thomas -     Empagliflozin (JARDIANCE) 10 MG Oral Tab; Take 1 tablet by mouth daily.  -     Phentermine HCl 37.5 MG Oral Tab; Take 1 tablet (37.5 mg total) by mouth every morning.     Severe obesity with body mass index (BMI) of 35.0 to 39.9 with serious comorbidi One theory from [employer], known as the life course perspective, would encourage you to reflect on your weight by looking at how you have grown and changed over time.  This perspective suggests that the patterns in your weight over time are due to By looking at the events in your life that have shaped your thoughts and circumstances, you can learn from your past and use those lessons to help you manage your weight and your health as a whole.  This may also help you achieve greater self-awareness and

## 2020-11-23 NOTE — PATIENT INSTRUCTIONS
Continue making lifestyle changes that focus on good nutrition, regular exercise and stress management. Medication Plan: Continue current medication regimen.     Agreed upon goal/s before next office visit include: Life events can certainly impact our we ? Neighborhood environment  ? Income level  This category also includes environments with many high-calorie and tempting foods that are available around the clock.  Some people have limited access to healthy food and places where it is safe to do leisurely

## 2021-01-04 NOTE — TELEPHONE ENCOUNTER
Requesting   Requested Prescriptions     Pending Prescriptions Disp Refills   • METFORMIN  MG Oral Tab [Pharmacy Med Name: METFORMIN  MG TABLET] 180 tablet 0     Sig: TAKE 1 TABLET BY MOUTH TWICE A DAY WITH MEALS     LOV: 11/23/20  RTC: 3 mon

## 2021-01-28 ENCOUNTER — OFFICE VISIT (OUTPATIENT)
Dept: FAMILY MEDICINE CLINIC | Facility: CLINIC | Age: 56
End: 2021-01-28
Payer: COMMERCIAL

## 2021-01-28 VITALS
HEIGHT: 72 IN | HEART RATE: 75 BPM | TEMPERATURE: 97 F | SYSTOLIC BLOOD PRESSURE: 130 MMHG | WEIGHT: 212.25 LBS | DIASTOLIC BLOOD PRESSURE: 70 MMHG | RESPIRATION RATE: 16 BRPM | BODY MASS INDEX: 28.75 KG/M2

## 2021-01-28 DIAGNOSIS — S43.401A SPRAIN OF RIGHT SHOULDER, UNSPECIFIED SHOULDER SPRAIN TYPE, INITIAL ENCOUNTER: Primary | ICD-10-CM

## 2021-01-28 PROCEDURE — 3075F SYST BP GE 130 - 139MM HG: CPT | Performed by: FAMILY MEDICINE

## 2021-01-28 PROCEDURE — 3078F DIAST BP <80 MM HG: CPT | Performed by: FAMILY MEDICINE

## 2021-01-28 PROCEDURE — 3008F BODY MASS INDEX DOCD: CPT | Performed by: FAMILY MEDICINE

## 2021-01-28 PROCEDURE — 99213 OFFICE O/P EST LOW 20 MIN: CPT | Performed by: FAMILY MEDICINE

## 2021-01-28 RX ORDER — CYCLOBENZAPRINE HCL 10 MG
10 TABLET ORAL NIGHTLY PRN
Qty: 14 TABLET | Refills: 1 | Status: SHIPPED | OUTPATIENT
Start: 2021-01-28 | End: 2022-02-07

## 2021-01-28 NOTE — PROGRESS NOTES
HPI:    Patient ID: Sean Benz. is a 54year old male. HPI  Mr. Jeffrey Stein. is a pleasant 19-year-old gentleman here today for right shoulder pain for the past 3 weeks. He is right-hand dominant.   He localizes the pain on the anterior aspect of HCl 40 MG Oral Cap Take 1 capsule (40 mg total) by mouth daily. 90 capsule 1   • topiramate 50 MG Oral Tab Take 1 tablet (50 mg total) by mouth 2 (two) times daily.  180 tablet 1   • Varenicline Tartrate (CHANTIX) 1 MG Oral Tab Take 1 tablet (1 mg total) by

## 2021-01-28 NOTE — PATIENT INSTRUCTIONS
Thank you for choosing Jack Haines MD at [place] [address]  To Do: Rebeka Bowens.  1. Please take meds as directed. [facility] is located in [address]. Monday, Tuesday & Friday – 8 a.m. to 4 p.m.   Wednesday, Thursday – 7 a.m. to outweigh those potential risks and we strive to make you healthier and to improve your quality of life.     Referrals, and Radiology Information:    If your insurance requires a referral to a specialist, please allow 5 business days to process your referral ice pack by putting ice cubes in a plastic bag. A bag of frozen peas or something similar works well too. Wrap the bag in a thin towel. Continue with ice packs 3 to 4 times a day for the next 2 to 3 days.  Then use the pack as needed to ease pain and swelli

## 2021-02-03 DIAGNOSIS — Z51.81 ENCOUNTER FOR THERAPEUTIC DRUG MONITORING: ICD-10-CM

## 2021-02-03 DIAGNOSIS — E66.01 SEVERE OBESITY WITH BODY MASS INDEX (BMI) OF 35.0 TO 39.9 WITH SERIOUS COMORBIDITY (HCC): ICD-10-CM

## 2021-02-03 RX ORDER — FLUOXETINE HYDROCHLORIDE 40 MG/1
CAPSULE ORAL
Qty: 90 CAPSULE | Refills: 3 | Status: SHIPPED | OUTPATIENT
Start: 2021-02-03 | End: 2022-01-02

## 2021-02-03 NOTE — TELEPHONE ENCOUNTER
Requesting Topiramate 50 mg  LOV: 11/23/20  RTC: 3 months  Last Relevant Labs: na  Filled: 8/1/20 #180 with 1 refills    No future appointments.     Pended and my chart sent to patient to remind to schedule in Feb.

## 2021-02-03 NOTE — TELEPHONE ENCOUNTER
Requesting FLUOXETINE HCL 40 MG  LOV: 1/28/21  RTC:   Last Relevant Labs: 9/8/20  Filled: 9/2/20  # 90with 1 refills    No future appointments.

## 2021-02-06 RX ORDER — TOPIRAMATE 50 MG/1
50 TABLET, FILM COATED ORAL 2 TIMES DAILY
Qty: 180 TABLET | Refills: 0 | Status: SHIPPED | OUTPATIENT
Start: 2021-02-06 | End: 2021-05-03

## 2021-02-20 RX ORDER — ALPRAZOLAM 0.25 MG/1
TABLET ORAL
Qty: 30 TABLET | Refills: 1 | Status: SHIPPED | OUTPATIENT
Start: 2021-02-20 | End: 2021-06-05

## 2021-02-20 NOTE — TELEPHONE ENCOUNTER
Requested Prescriptions     Pending Prescriptions Disp Refills   • ALPRAZOLAM 0.25 MG Oral Tab [Pharmacy Med Name: ALPRAZOLAM 0.25 MG TABLET] 30 tablet 1     Sig: TAKE 1 TABLET BY MOUTH DAILY AS NEEDED.      LOV: 1/28/21  RTC:   Last Relevant Labs: 9/8/20

## 2021-02-24 ENCOUNTER — OFFICE VISIT (OUTPATIENT)
Dept: INTERNAL MEDICINE CLINIC | Facility: CLINIC | Age: 56
End: 2021-02-24
Payer: COMMERCIAL

## 2021-02-24 VITALS
WEIGHT: 213 LBS | HEART RATE: 78 BPM | BODY MASS INDEX: 30.49 KG/M2 | HEIGHT: 70 IN | RESPIRATION RATE: 14 BRPM | DIASTOLIC BLOOD PRESSURE: 74 MMHG | SYSTOLIC BLOOD PRESSURE: 130 MMHG

## 2021-02-24 DIAGNOSIS — I10 ESSENTIAL HYPERTENSION: ICD-10-CM

## 2021-02-24 DIAGNOSIS — Z51.81 ENCOUNTER FOR THERAPEUTIC DRUG MONITORING: Primary | ICD-10-CM

## 2021-02-24 DIAGNOSIS — R63.5 WEIGHT GAIN: ICD-10-CM

## 2021-02-24 DIAGNOSIS — R73.03 PRE-DIABETES: ICD-10-CM

## 2021-02-24 DIAGNOSIS — E66.01 SEVERE OBESITY WITH BODY MASS INDEX (BMI) OF 35.0 TO 39.9 WITH SERIOUS COMORBIDITY (HCC): ICD-10-CM

## 2021-02-24 PROCEDURE — 99214 OFFICE O/P EST MOD 30 MIN: CPT | Performed by: NURSE PRACTITIONER

## 2021-02-24 PROCEDURE — 3075F SYST BP GE 130 - 139MM HG: CPT | Performed by: NURSE PRACTITIONER

## 2021-02-24 PROCEDURE — 3078F DIAST BP <80 MM HG: CPT | Performed by: NURSE PRACTITIONER

## 2021-02-24 PROCEDURE — 3008F BODY MASS INDEX DOCD: CPT | Performed by: NURSE PRACTITIONER

## 2021-02-24 RX ORDER — PHENTERMINE HYDROCHLORIDE 37.5 MG/1
37.5 TABLET ORAL EVERY MORNING
Qty: 30 TABLET | Refills: 2 | Status: SHIPPED | OUTPATIENT
Start: 2021-02-24 | End: 2021-05-20

## 2021-02-24 NOTE — PROGRESS NOTES
Vola Phoenix. is a 54year old male presents today for follow-up on medical weight loss program for the treatment of overweight, obesity, or morbid obesity with HTN, prediabetes, hyperlipidemia, JAVID.    S:  Current weight Wt Readings from Last 6 Enc labored  CARDIO: RRR without murmur, normal S1 and S2 without clicks or gallops, no pedal edema.   GI: +BS  NEURO/MS: motor and sensory grossly intact  PSYCH: pleasant, cooperative, normal mood    ASSESSMENT AND PLAN:  Encounter for therapeutic drug monitor vegetables and 1 cups of low carb fruits per day. Fruits and vegetables provide nutrients vital for health and maintenance of your body. Those individuals eating fruits and vegetables are likely to have a reduced risk of some chronic diseases.  Dietary fibe

## 2021-03-09 NOTE — TELEPHONE ENCOUNTER
Requesting FLUOXETINE HCL 40 MG   LOV: 9/2/20  RTC: 6 months  Last Relevant Labs: lab orders pending  9/2/20 # 90 with 1 refill  Future Appointments   Date Time Provider Alba Skinner   9/8/2020  8:30 AM REF SO PFLD REF EMG17 Ref PFLD 17   9/22/2020  3 Subjective   Adry Knight is a 33 y.o. female.   You have chosen to receive care through a telehealth visit.  Do you consent to use a video/audio connection for your medical care today? Yes    History of Present Illness   Ardy Knight 33 y.o. female who presents for evaluation of sore throat. Symptoms include uvula edema and redness.  Onset of symptoms was 1 day ago, unchanged since that time. Patient denies shortness of breath, wheezing, fever.   Evaluation to date: none Treatment to date:  none.   Does have some fatigue, dull h/a, mild congestion,   Has no tonsils  Uvula edema and red---can eat, drink, breathe.    I will send Wellbutrin XL for anxiety and depression--working well    The following portions of the patient's history were reviewed and updated as appropriate: allergies, current medications, past family history, past medical history, past social history, past surgical history and problem list.    Review of Systems   Constitutional: Positive for fatigue. Negative for activity change, appetite change, fever and unexpected weight change.   HENT: Positive for congestion and sore throat. Negative for nosebleeds and trouble swallowing.    Eyes: Negative for pain and visual disturbance.   Respiratory: Negative for chest tightness, shortness of breath and wheezing.    Cardiovascular: Negative for chest pain and palpitations.   Gastrointestinal: Negative for abdominal pain and blood in stool.   Endocrine: Negative.    Genitourinary: Negative for difficulty urinating and hematuria.   Musculoskeletal: Negative for joint swelling.   Skin: Negative for color change and rash.   Allergic/Immunologic: Negative.    Neurological: Negative for syncope, speech difficulty and headaches.   Hematological: Positive for adenopathy.   Psychiatric/Behavioral: Negative for agitation and confusion.   All other systems reviewed and are negative.      Objective   Physical Exam  Constitutional:       General: She is not  in acute distress.     Appearance: She is well-developed. She is not diaphoretic.   HENT:      Head: Normocephalic and atraumatic.   Eyes:      Conjunctiva/sclera: Conjunctivae normal.   Pulmonary:      Effort: Pulmonary effort is normal. No respiratory distress.   Musculoskeletal:         General: Normal range of motion.      Cervical back: Normal range of motion.   Skin:     General: Skin is dry.   Neurological:      Mental Status: She is alert and oriented to person, place, and time.      Coordination: Coordination normal.   Psychiatric:         Behavior: Behavior normal.         Thought Content: Thought content normal.         Judgment: Judgment normal.         Assessment/Plan   Diagnoses and all orders for this visit:    1. Uvulitis (Primary)    2. Anxiety    3. Recurrent major depressive disorder, in full remission (CMS/[facility])    Other orders  -     fluconazole (Diflucan) 150 MG tablet; Take 1 tablet by mouth 1 (One) Time for 1 dose. One PO X 1 for yeast infection  Dispense: 1 tablet; Refill: 2  -     amoxicillin (AMOXIL) 875 MG tablet; Take 1 tablet by mouth Every 12 (Twelve) Hours. For infection  Dispense: 20 tablet; Refill: 0  -     predniSONE (DELTASONE) 20 MG tablet; Take 1 tablet by mouth 2 (Two) Times a Day. With food for 5 days  Dispense: 10 tablet; Refill: 0  -     buPROPion XL (Wellbutrin XL) 150 MG 24 hr tablet; Take 1 tablet by mouth Daily. For stress  Dispense: 90 tablet; Refill: 3      Cont Wellbutrin XL--working well on depression  Start Amoxil to cover strep  Oral pred for uvula edema.  APAP

## 2021-03-15 RX ORDER — FEBUXOSTAT 40 MG/1
TABLET, FILM COATED ORAL
Qty: 90 TABLET | Refills: 1 | Status: SHIPPED | OUTPATIENT
Start: 2021-03-15 | End: 2021-09-12

## 2021-03-15 NOTE — TELEPHONE ENCOUNTER
Requested Prescriptions     Pending Prescriptions Disp Refills   • FEBUXOSTAT 40 MG Oral Tab [Pharmacy Med Name: FEBUXOSTAT 40 MG TABLET] 90 tablet 1     Sig: TAKE 1 TABLET BY MOUTH EVERY DAY     LOV: 1/28/21  RTC:   Last Relevant Labs: 9/8/20  Filled: 9/2

## 2021-03-23 RX ORDER — SILDENAFIL CITRATE 20 MG/1
TABLET ORAL
Qty: 8 TABLET | Refills: 5 | Status: SHIPPED | OUTPATIENT
Start: 2021-03-23 | End: 2021-07-02

## 2021-03-23 NOTE — TELEPHONE ENCOUNTER
Requested Prescriptions     Pending Prescriptions Disp Refills   • SILDENAFIL CITRATE 20 MG Oral Tab [Pharmacy Med Name: SILDENAFIL 20 MG TABLET] 8 tablet 5     Sig: TAKE 1 TABLET BY MOUTH DAILY FOR ERECTIONS     LOV: 2/24/21  RTC:   Last Relevant Labs: 9/

## 2021-04-07 NOTE — TELEPHONE ENCOUNTER
Requesting metformin 500 mg  LOV: 2/24/21  RTC: 3 months  Last Relevant Labs: 9/8/21  Filled: 1/4/21 #180 with 0 refills    Future Appointments   Date Time Provider Alba Skinner   5/20/2021  9:00 AM TEO Bowers EMG [facility]

## 2021-04-30 DIAGNOSIS — R73.03 PRE-DIABETES: ICD-10-CM

## 2021-04-30 DIAGNOSIS — E66.01 SEVERE OBESITY WITH BODY MASS INDEX (BMI) OF 35.0 TO 39.9 WITH SERIOUS COMORBIDITY (HCC): ICD-10-CM

## 2021-04-30 DIAGNOSIS — I10 ESSENTIAL HYPERTENSION: ICD-10-CM

## 2021-04-30 DIAGNOSIS — Z51.81 ENCOUNTER FOR THERAPEUTIC DRUG MONITORING: ICD-10-CM

## 2021-04-30 RX ORDER — NIFEDIPINE 60 MG/1
TABLET, FILM COATED, EXTENDED RELEASE ORAL
Qty: 90 TABLET | Refills: 1 | Status: SHIPPED | OUTPATIENT
Start: 2021-04-30 | End: 2021-11-24

## 2021-04-30 NOTE — TELEPHONE ENCOUNTER
Requesting topiramate 50 mg and Saxenda  LOV: 2/24/21  RTC: 3 months  Last Relevant Labs: na  Filled: 11/13/21 #45ml with 1 refills  Saxenda  Filled:  2/6/21 #180 with 0 refills topiramate    Future Appointments   Date Time Provider Alba Skinner   5/2

## 2021-05-03 RX ORDER — LIRAGLUTIDE 6 MG/ML
INJECTION, SOLUTION SUBCUTANEOUS
Qty: 45 ML | Refills: 1 | Status: SHIPPED | OUTPATIENT
Start: 2021-05-03 | End: 2021-11-15

## 2021-05-03 RX ORDER — TOPIRAMATE 50 MG/1
TABLET, FILM COATED ORAL
Qty: 180 TABLET | Refills: 0 | Status: SHIPPED | OUTPATIENT
Start: 2021-05-03 | End: 2021-07-27

## 2021-05-14 DIAGNOSIS — Z51.81 ENCOUNTER FOR THERAPEUTIC DRUG MONITORING: ICD-10-CM

## 2021-05-14 DIAGNOSIS — R73.03 PRE-DIABETES: ICD-10-CM

## 2021-05-14 DIAGNOSIS — E66.01 SEVERE OBESITY WITH BODY MASS INDEX (BMI) OF 35.0 TO 39.9 WITH SERIOUS COMORBIDITY (HCC): ICD-10-CM

## 2021-05-14 DIAGNOSIS — I10 ESSENTIAL HYPERTENSION: ICD-10-CM

## 2021-05-14 RX ORDER — EMPAGLIFLOZIN 10 MG/1
TABLET, FILM COATED ORAL
Qty: 90 TABLET | Refills: 1 | OUTPATIENT
Start: 2021-05-14

## 2021-05-14 NOTE — TELEPHONE ENCOUNTER
Requesting Jardiance 10 mg  LOV: 2/24/21  RTC: 3 months  Last Relevant Labs: 9/8/20  Filled: 11/23/20 #90 with 1 refills    Future Appointments   Date Time Provider Alba Skinner   5/20/2021  9:00 AM TEO Wall EMG [facility]

## 2021-05-20 ENCOUNTER — OFFICE VISIT (OUTPATIENT)
Dept: INTERNAL MEDICINE CLINIC | Facility: CLINIC | Age: 56
End: 2021-05-20
Payer: COMMERCIAL

## 2021-05-20 VITALS
RESPIRATION RATE: 14 BRPM | DIASTOLIC BLOOD PRESSURE: 64 MMHG | SYSTOLIC BLOOD PRESSURE: 116 MMHG | HEIGHT: 70 IN | BODY MASS INDEX: 30.21 KG/M2 | OXYGEN SATURATION: 98 % | HEART RATE: 71 BPM | WEIGHT: 211 LBS

## 2021-05-20 DIAGNOSIS — R73.03 PRE-DIABETES: ICD-10-CM

## 2021-05-20 DIAGNOSIS — Z51.81 ENCOUNTER FOR THERAPEUTIC DRUG MONITORING: Primary | ICD-10-CM

## 2021-05-20 DIAGNOSIS — E66.01 SEVERE OBESITY WITH BODY MASS INDEX (BMI) OF 35.0 TO 39.9 WITH SERIOUS COMORBIDITY (HCC): ICD-10-CM

## 2021-05-20 DIAGNOSIS — I10 ESSENTIAL HYPERTENSION: ICD-10-CM

## 2021-05-20 PROCEDURE — 3074F SYST BP LT 130 MM HG: CPT | Performed by: NURSE PRACTITIONER

## 2021-05-20 PROCEDURE — 99213 OFFICE O/P EST LOW 20 MIN: CPT | Performed by: NURSE PRACTITIONER

## 2021-05-20 PROCEDURE — 3078F DIAST BP <80 MM HG: CPT | Performed by: NURSE PRACTITIONER

## 2021-05-20 PROCEDURE — 3008F BODY MASS INDEX DOCD: CPT | Performed by: NURSE PRACTITIONER

## 2021-05-20 RX ORDER — PHENTERMINE HYDROCHLORIDE 37.5 MG/1
37.5 TABLET ORAL EVERY MORNING
Qty: 30 TABLET | Refills: 2 | Status: SHIPPED | OUTPATIENT
Start: 2021-05-20 | End: 2021-08-26

## 2021-05-20 RX ORDER — SYRINGE WITH NEEDLE, 1 ML 25GX5/8"
SYRINGE, EMPTY DISPOSABLE MISCELLANEOUS
COMMUNITY
Start: 2021-03-20

## 2021-05-20 RX ORDER — TESTOSTERONE CYPIONATE 200 MG/ML
50 INJECTION INTRAMUSCULAR WEEKLY
COMMUNITY
Start: 2021-03-20

## 2021-05-20 NOTE — PROGRESS NOTES
Inge Graves. is a 54year old male presents today for follow-up on medical weight loss program for the treatment of overweight, obesity, or morbid obesity with HTN, prediabetes, hyperlipidemia, JAVID.    S:  Current weight Wt Readings from Last 6 Enc Resp 14   Ht 5' 10\" (1.778 m)   Wt 211 lb (95.7 kg)   SpO2 98%   BMI 30.28 kg/m²    GENERAL: well developed, well nourished, in no apparent distress, obese  EYES: conjunctiva pink, sclera non icteric  LUNGS: CTA, breathing non labored  CARDIO: RRR without nutrition. Meal to go plans and meal prep paula like [employer] are some of the few. Additional resources below. Be reminded that food is fuel and nutrient dense food promotes energy.     Re-thinking Nutrition: Learning to See Food as Fuel  October 8, 2019 • Post overall health and happiness. Building Blocks of Good Nutrition  A diet without enough fiber, protein and healthy fats can cause your cells to become brittle, leaky and tired.  When cells can't do what they are designed to do, problems like inflammation, c Further, the food we eat these days consists of processed products and junk food that is excessively high in sugar and salt content. These are nothing but toxins which get accumulated in the intracellular regions of our body.  As a result, these toxins star much as possible so as to get rid of obesity. Follow these tips to reduce obesity and have a healthier body. Add a lot of colors to your diet – Eat as many fruits and vegetables as you can.  Raw fruits and vegetables have a healthy amount of fiber, and the Zhanna @ https://ochoa-vizcaino.org/. Group Fitness 769-661-2425 and/or email Bee Ask at Zohra@[employer]. [employer]    Online Fitness  · Fitness  on airpim in 10 DVD series   www. gbriu04IBB. [employer]  · Sit and Be Fit - Chair exercise series Changers- Netflix Documentary on plant based nutrition  · Fed Up - documentary about obesity (Free on [facility])  · The Truth About Sugar - documentary on sugar (Free on [facility], https://youtu. be/5Z3rzzmIE3a)  · The Dr. Jazlyn Jin by Dr. Renny Malin

## 2021-06-02 NOTE — TELEPHONE ENCOUNTER
I called Rambo Clrak at [facility] - Clonazepam is 0.5 mg one nightly as needed #30 with 2 refills.   She will correct since it just said prn Sahra Grimaldo is a 79 y.o. female evaluated via telephone on 6/2/2021. Consent:  She and/or health care decision maker is aware that that she may receive a bill for this telephone service, depending on her insurance coverage, and has provided verbal consent to proceed: Yes      Documentation:  I communicated with the patient and/or health care decision maker about cough. Details of this discussion including any medical advice provided:     Patient has been sick for 2 weeks. She caught it from her grandchildren, grandchildren tested negative for covid. Started with head congestion   Now with cough, cough is productive, clear phlegm. Has been taking mucinex with minimal improvement. Increased fatigue. Also with increased shortness of breath. No fever, chills, nausea, vomiting, diarrhea. Good appetite. Rhea Millan was seen today for cough, chest congestion, head congestion and shortness of breath. Diagnoses and all orders for this visit:    Respiratory infection  -     doxycycline hyclate (VIBRA-TABS) 100 MG tablet; Take 1 tablet by mouth 2 times daily for 7 days  -     albuterol sulfate HFA (VENTOLIN HFA) 108 (90 Base) MCG/ACT inhaler; Inhale 2 puffs into the lungs 4 times daily as needed for Wheezing  - recommended covid testing, patient declined   - given chronicity of sxs will treat with abx and prn inhaler. Also instructed to use mucinex in the am and robitussin DM at night. - Pt to notify office with new or worsening sxs, would then consider xray. I affirm this is a Patient Initiated Episode with a Patient who has not had a related appointment within my department in the past 7 days or scheduled within the next 24 hours.     Patient identification was verified at the start of the visit: Yes    Total Time: minutes: 5-10 minutes    The visit was conducted pursuant to the emergency declaration under the [facility], [address] [place] [address] authority and the [employer] General Act. Patient identification was verified, and a caregiver was present when appropriate. The patient was located in a state where the provider was credentialed to provide care.     Note: not billable if this call serves to triage the patient into an appointment for the relevant concern      MADELEINE Blood

## 2021-06-04 NOTE — TELEPHONE ENCOUNTER
Requesting Alprazolam 0.25mg  LOV: 1/28/21  RTC: prn  Last Relevant Labs: 9/8/2020  Filled: 2/20/21 #30 with 1 refills    No future appointments.     [place] , [place] 3/22/21 #30    Rx pended and routed for approval/denial

## 2021-06-05 RX ORDER — ALPRAZOLAM 0.25 MG/1
TABLET ORAL
Qty: 30 TABLET | Refills: 1 | Status: SHIPPED | OUTPATIENT
Start: 2021-06-05 | End: 2021-09-02

## 2021-06-07 NOTE — TELEPHONE ENCOUNTER
Requesting Novofine needles  LOV: 5/20/21  RTC: 3 months  Last Relevant Labs: na  Filled: 9/15/20 #90 with 1 refills    No future appointments.     Used with Makayla

## 2021-07-02 RX ORDER — SILDENAFIL CITRATE 20 MG/1
TABLET ORAL
Qty: 8 TABLET | Refills: 5 | Status: SHIPPED | OUTPATIENT
Start: 2021-07-02 | End: 2021-12-07

## 2021-07-02 NOTE — TELEPHONE ENCOUNTER
SILDENAFIL 20 MG TABLET          Will file in chart as: SILDENAFIL CITRATE 20 MG Oral Tab    Sig: TAKE 1 TABLET BY MOUTH DAILY FOR ERECTIONS    Disp:  8 tablet    Refills:  5    Start: 7/2/2021    Class: Normal    Non-formulary    Last ordered: 3 months ag

## 2021-07-07 NOTE — TELEPHONE ENCOUNTER
Requesting   Requested Prescriptions     Pending Prescriptions Disp Refills   • METFORMIN  MG Oral Tab [Pharmacy Med Name: METFORMIN  MG TABLET] 180 tablet 0     Sig: TAKE 1 TABLET BY MOUTH TWICE A DAY WITH MEALS     LOV: 5/20/21  RTC: 3 teresa

## 2021-07-26 DIAGNOSIS — E66.01 SEVERE OBESITY WITH BODY MASS INDEX (BMI) OF 35.0 TO 39.9 WITH SERIOUS COMORBIDITY (HCC): ICD-10-CM

## 2021-07-26 DIAGNOSIS — Z51.81 ENCOUNTER FOR THERAPEUTIC DRUG MONITORING: ICD-10-CM

## 2021-07-26 NOTE — TELEPHONE ENCOUNTER
Requesting topamax 50 mg bid   LOV: 5/20/21  RTC:   Last Relevant Labs:   Filled: 5/3/21 #180 with 0 refills    Future Appointments   Date Time Provider Alba Skinner   8/26/2021  2:20 PM TEO Dill EMG [facility]

## 2021-07-27 RX ORDER — TOPIRAMATE 50 MG/1
TABLET, FILM COATED ORAL
Qty: 180 TABLET | Refills: 0 | Status: SHIPPED | OUTPATIENT
Start: 2021-07-27 | End: 2021-08-26

## 2021-08-26 ENCOUNTER — OFFICE VISIT (OUTPATIENT)
Dept: INTERNAL MEDICINE CLINIC | Facility: CLINIC | Age: 56
End: 2021-08-26
Payer: COMMERCIAL

## 2021-08-26 VITALS
HEART RATE: 72 BPM | WEIGHT: 214 LBS | BODY MASS INDEX: 30.64 KG/M2 | HEIGHT: 70 IN | DIASTOLIC BLOOD PRESSURE: 74 MMHG | RESPIRATION RATE: 16 BRPM | SYSTOLIC BLOOD PRESSURE: 136 MMHG

## 2021-08-26 DIAGNOSIS — E66.01 SEVERE OBESITY WITH BODY MASS INDEX (BMI) OF 35.0 TO 39.9 WITH SERIOUS COMORBIDITY (HCC): ICD-10-CM

## 2021-08-26 DIAGNOSIS — Z51.81 ENCOUNTER FOR THERAPEUTIC DRUG MONITORING: Primary | ICD-10-CM

## 2021-08-26 DIAGNOSIS — R73.03 PRE-DIABETES: ICD-10-CM

## 2021-08-26 DIAGNOSIS — I10 ESSENTIAL HYPERTENSION: ICD-10-CM

## 2021-08-26 DIAGNOSIS — E78.1 HYPERTRIGLYCERIDEMIA: ICD-10-CM

## 2021-08-26 PROCEDURE — 3075F SYST BP GE 130 - 139MM HG: CPT | Performed by: NURSE PRACTITIONER

## 2021-08-26 PROCEDURE — 3008F BODY MASS INDEX DOCD: CPT | Performed by: NURSE PRACTITIONER

## 2021-08-26 PROCEDURE — 99214 OFFICE O/P EST MOD 30 MIN: CPT | Performed by: NURSE PRACTITIONER

## 2021-08-26 PROCEDURE — 3078F DIAST BP <80 MM HG: CPT | Performed by: NURSE PRACTITIONER

## 2021-08-26 RX ORDER — SYRINGE WITH NEEDLE, 1 ML 25GX5/8"
SYRINGE, EMPTY DISPOSABLE MISCELLANEOUS
COMMUNITY
Start: 2021-08-02

## 2021-08-26 RX ORDER — TOPIRAMATE 50 MG/1
50 TABLET, FILM COATED ORAL 2 TIMES DAILY
Qty: 180 TABLET | Refills: 1 | Status: SHIPPED | OUTPATIENT
Start: 2021-08-26

## 2021-08-26 RX ORDER — PHENTERMINE HYDROCHLORIDE 37.5 MG/1
37.5 TABLET ORAL EVERY MORNING
Qty: 30 TABLET | Refills: 2 | Status: SHIPPED | OUTPATIENT
Start: 2021-08-26 | End: 2021-10-07

## 2021-08-26 NOTE — PROGRESS NOTES
Corrinne Danielle. is a 64year old male presents today for follow-up on medical weight loss program for the treatment of overweight, obesity, or morbid obesity with HTN, prediabetes, hyperlipidemia, JAVDI.    S:  Current weight Wt Readings from Last 6 Enc apparent distress, obese  EYES: conjunctiva pink, sclera non icteric  LUNGS: CTA, breathing non labored  CARDIO: RRR without murmur, normal S1 and S2 without clicks or gallops, no pedal edema.   GI: +BS  NEURO/MS: motor and sensory grossly intact  PSYCH: pl work!    Balanced Nutrition includes:     1. Eat regular meals (breakfast, lunch, dinner) about every 4-5 hours  2. Eat a balanced plate with protein and produce at all meals: 1/4 plate- protein, 1/2 plate non starchy veggie preferred over fruit  3.  Water on the Scale. Don't even think about tossing the scale — you still need it. Make sure you step on it regularly, whether it's daily or weekly, to keep a close eye on the number and how it's trending.  You should be able to catch any red flags before they be

## 2021-08-27 NOTE — PATIENT INSTRUCTIONS
Continue making lifestyle changes that focus on good nutrition, regular exercise and stress management. Medication Plan: Continue current medication regimen.     Agreed upon goal/s before next office visit include: Weight maintenance continues to require Exam.  Remember, it's not all about the number on the scale! Schedule an exam with your primary care physician for an eval of your health.  If there are things you can still improve upon, like cholesterol or blood pressure, incorporate those goals into your

## 2021-09-02 RX ORDER — ALPRAZOLAM 0.25 MG/1
TABLET ORAL
Qty: 30 TABLET | Refills: 1 | Status: SHIPPED | OUTPATIENT
Start: 2021-09-02 | End: 2021-11-10

## 2021-09-12 RX ORDER — FEBUXOSTAT 40 MG/1
TABLET, FILM COATED ORAL
Qty: 90 TABLET | Refills: 1 | Status: SHIPPED | OUTPATIENT
Start: 2021-09-12

## 2021-10-07 DIAGNOSIS — Z51.81 ENCOUNTER FOR THERAPEUTIC DRUG MONITORING: ICD-10-CM

## 2021-10-07 DIAGNOSIS — E66.01 SEVERE OBESITY WITH BODY MASS INDEX (BMI) OF 35.0 TO 39.9 WITH SERIOUS COMORBIDITY (HCC): ICD-10-CM

## 2021-10-07 NOTE — TELEPHONE ENCOUNTER
Requesting phentermine 37.5  LOV:  8/26  RTC:   Last Relevant Labs:   Filled: 8/26/21 # 30 with 2 refills    Future Appointments   Date Time Provider Alba Skinner   11/15/2021  3:20 PM TEO Hopson EMGGERARDOI  [facility] that it wa

## 2021-10-08 RX ORDER — PHENTERMINE HYDROCHLORIDE 37.5 MG/1
37.5 TABLET ORAL EVERY MORNING
Qty: 30 TABLET | Refills: 1 | Status: SHIPPED | OUTPATIENT
Start: 2021-10-08 | End: 2021-11-15

## 2021-11-10 RX ORDER — ALPRAZOLAM 0.25 MG/1
TABLET ORAL
Qty: 30 TABLET | Refills: 0 | Status: SHIPPED | OUTPATIENT
Start: 2021-11-10

## 2021-11-15 ENCOUNTER — OFFICE VISIT (OUTPATIENT)
Dept: INTERNAL MEDICINE CLINIC | Facility: CLINIC | Age: 56
End: 2021-11-15
Payer: COMMERCIAL

## 2021-11-15 VITALS
HEART RATE: 70 BPM | RESPIRATION RATE: 16 BRPM | SYSTOLIC BLOOD PRESSURE: 136 MMHG | WEIGHT: 209 LBS | DIASTOLIC BLOOD PRESSURE: 78 MMHG | HEIGHT: 70 IN | BODY MASS INDEX: 29.92 KG/M2

## 2021-11-15 DIAGNOSIS — E66.01 SEVERE OBESITY WITH BODY MASS INDEX (BMI) OF 35.0 TO 39.9 WITH SERIOUS COMORBIDITY (HCC): ICD-10-CM

## 2021-11-15 DIAGNOSIS — I10 ESSENTIAL HYPERTENSION: ICD-10-CM

## 2021-11-15 DIAGNOSIS — Z51.81 ENCOUNTER FOR THERAPEUTIC DRUG MONITORING: Primary | ICD-10-CM

## 2021-11-15 DIAGNOSIS — R73.03 PRE-DIABETES: ICD-10-CM

## 2021-11-15 PROCEDURE — 3078F DIAST BP <80 MM HG: CPT | Performed by: NURSE PRACTITIONER

## 2021-11-15 PROCEDURE — 3075F SYST BP GE 130 - 139MM HG: CPT | Performed by: NURSE PRACTITIONER

## 2021-11-15 PROCEDURE — 3008F BODY MASS INDEX DOCD: CPT | Performed by: NURSE PRACTITIONER

## 2021-11-15 PROCEDURE — 99213 OFFICE O/P EST LOW 20 MIN: CPT | Performed by: NURSE PRACTITIONER

## 2021-11-15 RX ORDER — LIRAGLUTIDE 6 MG/ML
3 INJECTION, SOLUTION SUBCUTANEOUS DAILY
Qty: 45 ML | Refills: 1 | Status: SHIPPED | OUTPATIENT
Start: 2021-11-15

## 2021-11-15 RX ORDER — PHENTERMINE HYDROCHLORIDE 37.5 MG/1
37.5 TABLET ORAL EVERY MORNING
Qty: 30 TABLET | Refills: 2 | Status: SHIPPED | OUTPATIENT
Start: 2021-11-15

## 2021-11-15 NOTE — PATIENT INSTRUCTIONS
Continue making lifestyle changes that focus on good nutrition, regular exercise and stress management. Medication Plan: Continue current medication regimen. Agreed upon goal/s before next office visit include:  Reviewed labs with improved HgbA1c.  Co Fret the Holidays – Stay in [address]    #1 Plan Carefully for Food at [employer] a goal for the holidays. If you’re currently trying to lose weight, you might prefer putting this goal on hold for a short period.  In this case, reset your goal are eating. At the same time, try to be understanding of those who do. Not everyone has the same mindset about health. They may take it personally, but you don’t have to.     #3 Work on Your Mindset  Think about your relationship with food during the holida

## 2021-11-15 NOTE — PROGRESS NOTES
Meme Nicolegess. is a 64year old male presents today for follow-up on medical weight loss program for the treatment of overweight, obesity, or morbid obesity with HTN, prediabetes, hyperlipidemia, JAVID.    S:  Current weight Wt Readings from Last 6 Enc GENERAL: well developed, well nourished, in no apparent distress, overweight  EYES: conjunctiva pink, sclera non icteric  LUNGS: CTA, breathing non labored  CARDIO: RRR without murmur, normal S1 and S2 without clicks or gallops, no pedal edema.   GI: +BS regimen. Agreed upon goal/s before next office visit include:  Reviewed labs with improved HgbA1c. Continue to be your own self advocate with tips for getting through the holidays listed below. Work towards getting under 200#.  Recommend meals on the go lose weight, you might prefer putting this goal on hold for a short period. In this case, reset your goal and strive to maintain your weight instead. No matter what you try, make sure your primary focus is on how you feel vs. what the scale says.     Next, but you don’t have to. #3 Work on Your Mindset  Think about your relationship with food during the holidays. Why do we focus so much on it? Happiness comes from health and confidence. It comes from being around people we love.  It does NOT come from food

## 2021-11-24 RX ORDER — NIFEDIPINE 60 MG/1
TABLET, FILM COATED, EXTENDED RELEASE ORAL
Qty: 90 TABLET | Refills: 1 | Status: SHIPPED | OUTPATIENT
Start: 2021-11-24

## 2021-12-07 ENCOUNTER — PATIENT MESSAGE (OUTPATIENT)
Dept: FAMILY MEDICINE CLINIC | Facility: CLINIC | Age: 56
End: 2021-12-07

## 2021-12-07 RX ORDER — SILDENAFIL CITRATE 20 MG/1
TABLET ORAL
Qty: 8 TABLET | Refills: 5 | Status: SHIPPED | OUTPATIENT
Start: 2021-12-07

## 2021-12-07 NOTE — TELEPHONE ENCOUNTER
Sent [facility] to have patient call the office to schedule an annual physical and to let me know if needs a refill prior to an appt.

## 2021-12-07 NOTE — TELEPHONE ENCOUNTER
SILDENAFIL 20 MG TABLET          Will file in chart as: SILDENAFIL 20 MG Oral Tab    Sig: TAKE 1 TABLET BY MOUTH DAILY FOR ERECTIONS    Disp:  8 tablet    Refills:  5 (Pharmacy requested: Not specified)    Start: 12/7/2021    Class: Normal    Non-formulary

## 2022-01-02 RX ORDER — FLUOXETINE HYDROCHLORIDE 40 MG/1
CAPSULE ORAL
Qty: 90 CAPSULE | Refills: 3 | Status: SHIPPED | OUTPATIENT
Start: 2022-01-02

## 2022-01-10 ENCOUNTER — TELEPHONE (OUTPATIENT)
Dept: FAMILY MEDICINE CLINIC | Facility: CLINIC | Age: 57
End: 2022-01-10

## 2022-02-07 ENCOUNTER — OFFICE VISIT (OUTPATIENT)
Dept: FAMILY MEDICINE CLINIC | Facility: CLINIC | Age: 57
End: 2022-02-07
Payer: COMMERCIAL

## 2022-02-07 VITALS
TEMPERATURE: 98 F | SYSTOLIC BLOOD PRESSURE: 150 MMHG | BODY MASS INDEX: 30.21 KG/M2 | RESPIRATION RATE: 16 BRPM | HEIGHT: 70 IN | WEIGHT: 211 LBS | DIASTOLIC BLOOD PRESSURE: 80 MMHG | OXYGEN SATURATION: 99 % | HEART RATE: 60 BPM

## 2022-02-07 DIAGNOSIS — Z12.5 SCREENING FOR MALIGNANT NEOPLASM OF PROSTATE: ICD-10-CM

## 2022-02-07 DIAGNOSIS — Z00.00 WELLNESS EXAMINATION: Primary | ICD-10-CM

## 2022-02-07 PROCEDURE — 3077F SYST BP >= 140 MM HG: CPT | Performed by: FAMILY MEDICINE

## 2022-02-07 PROCEDURE — 3008F BODY MASS INDEX DOCD: CPT | Performed by: FAMILY MEDICINE

## 2022-02-07 PROCEDURE — 3079F DIAST BP 80-89 MM HG: CPT | Performed by: FAMILY MEDICINE

## 2022-02-07 PROCEDURE — 99396 PREV VISIT EST AGE 40-64: CPT | Performed by: FAMILY MEDICINE

## 2022-02-07 RX ORDER — ALPRAZOLAM 0.5 MG/1
0.5 TABLET ORAL DAILY PRN
Qty: 30 TABLET | Refills: 1 | Status: SHIPPED | OUTPATIENT
Start: 2022-02-07

## 2022-03-08 RX ORDER — NIFEDIPINE 60 MG/1
TABLET, FILM COATED, EXTENDED RELEASE ORAL
Qty: 90 TABLET | Refills: 1 | Status: SHIPPED | OUTPATIENT
Start: 2022-03-08

## 2022-03-08 RX ORDER — FEBUXOSTAT 40 MG/1
TABLET, FILM COATED ORAL
Qty: 90 TABLET | Refills: 1 | Status: SHIPPED | OUTPATIENT
Start: 2022-03-08

## 2022-03-08 NOTE — TELEPHONE ENCOUNTER
Requesting Metformin 500 mg  LOV: 11/15/21  RTC: 3 months  Last Relevant Labs: 9/8/20  Filled: 8/26/21 #180 with 1 refills    Future Appointments   Date Time Provider Alba Skinner   3/9/2022  3:00 PM TEO Plasencia EMGWEI EMG [facility]   3/14/2022 10:30 AM Aly Galan MD [facility] SUB GI

## 2022-03-09 ENCOUNTER — OFFICE VISIT (OUTPATIENT)
Dept: INTERNAL MEDICINE CLINIC | Facility: CLINIC | Age: 57
End: 2022-03-09
Payer: COMMERCIAL

## 2022-03-09 VITALS
WEIGHT: 208 LBS | BODY MASS INDEX: 29.78 KG/M2 | DIASTOLIC BLOOD PRESSURE: 80 MMHG | HEIGHT: 70 IN | OXYGEN SATURATION: 98 % | HEART RATE: 72 BPM | SYSTOLIC BLOOD PRESSURE: 132 MMHG | RESPIRATION RATE: 20 BRPM

## 2022-03-09 DIAGNOSIS — I10 ESSENTIAL HYPERTENSION: ICD-10-CM

## 2022-03-09 DIAGNOSIS — E78.1 HYPERTRIGLYCERIDEMIA: ICD-10-CM

## 2022-03-09 DIAGNOSIS — R73.03 PRE-DIABETES: ICD-10-CM

## 2022-03-09 DIAGNOSIS — Z51.81 ENCOUNTER FOR THERAPEUTIC DRUG MONITORING: Primary | ICD-10-CM

## 2022-03-09 DIAGNOSIS — E66.01 SEVERE OBESITY WITH BODY MASS INDEX (BMI) OF 35.0 TO 39.9 WITH SERIOUS COMORBIDITY (HCC): ICD-10-CM

## 2022-03-09 PROCEDURE — 3075F SYST BP GE 130 - 139MM HG: CPT | Performed by: NURSE PRACTITIONER

## 2022-03-09 PROCEDURE — 99214 OFFICE O/P EST MOD 30 MIN: CPT | Performed by: NURSE PRACTITIONER

## 2022-03-09 PROCEDURE — 3008F BODY MASS INDEX DOCD: CPT | Performed by: NURSE PRACTITIONER

## 2022-03-09 PROCEDURE — 3079F DIAST BP 80-89 MM HG: CPT | Performed by: NURSE PRACTITIONER

## 2022-03-09 RX ORDER — TOPIRAMATE 50 MG/1
50 TABLET, FILM COATED ORAL 2 TIMES DAILY
Qty: 180 TABLET | Refills: 1 | Status: SHIPPED | OUTPATIENT
Start: 2022-03-09

## 2022-03-09 RX ORDER — SEMAGLUTIDE 1.7 MG/.75ML
1.7 INJECTION, SOLUTION SUBCUTANEOUS WEEKLY
Qty: 3 ML | Refills: 0 | Status: SHIPPED | OUTPATIENT
Start: 2022-03-09 | End: 2022-03-31

## 2022-03-09 RX ORDER — PHENTERMINE HYDROCHLORIDE 37.5 MG/1
37.5 TABLET ORAL EVERY MORNING
Qty: 30 TABLET | Refills: 2 | Status: SHIPPED | OUTPATIENT
Start: 2022-03-09

## 2022-03-11 ENCOUNTER — LAB ENCOUNTER (OUTPATIENT)
Dept: LAB | Age: 57
End: 2022-03-11
Attending: FAMILY MEDICINE
Payer: COMMERCIAL

## 2022-03-11 ENCOUNTER — LAB ENCOUNTER (OUTPATIENT)
Dept: LAB | Age: 57
End: 2022-03-11
Attending: INTERNAL MEDICINE
Payer: COMMERCIAL

## 2022-03-11 DIAGNOSIS — Z12.5 SCREENING FOR MALIGNANT NEOPLASM OF PROSTATE: ICD-10-CM

## 2022-03-11 DIAGNOSIS — Z00.00 WELLNESS EXAMINATION: ICD-10-CM

## 2022-03-11 DIAGNOSIS — Z01.818 PRE-OP TESTING: ICD-10-CM

## 2022-03-11 LAB
ALBUMIN SERPL-MCNC: 3.9 G/DL (ref 3.4–5)
ALBUMIN/GLOB SERPL: 1.1 {RATIO} (ref 1–2)
ALP LIVER SERPL-CCNC: 54 U/L
ALT SERPL-CCNC: 38 U/L
ANION GAP SERPL CALC-SCNC: 4 MMOL/L (ref 0–18)
AST SERPL-CCNC: 20 U/L (ref 15–37)
BASOPHILS # BLD AUTO: 0.07 X10(3) UL (ref 0–0.2)
BASOPHILS NFR BLD AUTO: 0.9 %
BILIRUB SERPL-MCNC: 0.4 MG/DL (ref 0.1–2)
BUN BLD-MCNC: 13 MG/DL (ref 7–18)
CALCIUM BLD-MCNC: 9.1 MG/DL (ref 8.5–10.1)
CHOLEST SERPL-MCNC: 244 MG/DL (ref ?–200)
CO2 SERPL-SCNC: 28 MMOL/L (ref 21–32)
COMPLEXED PSA SERPL-MCNC: 1.98 NG/ML (ref ?–4)
CREAT BLD-MCNC: 1.05 MG/DL
EOSINOPHIL # BLD AUTO: 0.25 X10(3) UL (ref 0–0.7)
EOSINOPHIL NFR BLD AUTO: 3.3 %
ERYTHROCYTE [DISTWIDTH] IN BLOOD BY AUTOMATED COUNT: 15.7 %
EST. AVERAGE GLUCOSE BLD GHB EST-MCNC: 123 MG/DL (ref 68–126)
FASTING PATIENT LIPID ANSWER: YES
FASTING STATUS PATIENT QL REPORTED: YES
GLOBULIN PLAS-MCNC: 3.5 G/DL (ref 2.8–4.4)
GLUCOSE BLD-MCNC: 116 MG/DL (ref 70–99)
HBA1C MFR BLD: 5.9 % (ref ?–5.7)
HCT VFR BLD AUTO: 53.3 %
HDLC SERPL-MCNC: 32 MG/DL (ref 40–59)
HGB BLD-MCNC: 16.4 G/DL
IMM GRANULOCYTES # BLD AUTO: 0.03 X10(3) UL (ref 0–1)
IMM GRANULOCYTES NFR BLD: 0.4 %
LDLC SERPL CALC-MCNC: 182 MG/DL (ref ?–100)
LYMPHOCYTES # BLD AUTO: 1.86 X10(3) UL (ref 1–4)
LYMPHOCYTES NFR BLD AUTO: 24.5 %
MCH RBC QN AUTO: 27.2 PG (ref 26–34)
MCHC RBC AUTO-ENTMCNC: 30.8 G/DL (ref 31–37)
MCV RBC AUTO: 88.2 FL
MONOCYTES # BLD AUTO: 0.53 X10(3) UL (ref 0.1–1)
MONOCYTES NFR BLD AUTO: 7 %
NEUTROPHILS # BLD AUTO: 4.85 X10 (3) UL (ref 1.5–7.7)
NEUTROPHILS # BLD AUTO: 4.85 X10(3) UL (ref 1.5–7.7)
NEUTROPHILS NFR BLD AUTO: 63.9 %
NONHDLC SERPL-MCNC: 212 MG/DL (ref ?–130)
OSMOLALITY SERPL CALC.SUM OF ELEC: 293 MOSM/KG (ref 275–295)
PLATELET # BLD AUTO: 230 10(3)UL (ref 150–450)
POTASSIUM SERPL-SCNC: 4.4 MMOL/L (ref 3.5–5.1)
PROT SERPL-MCNC: 7.4 G/DL (ref 6.4–8.2)
RBC # BLD AUTO: 6.04 X10(6)UL
SODIUM SERPL-SCNC: 141 MMOL/L (ref 136–145)
TRIGL SERPL-MCNC: 160 MG/DL (ref 30–149)
TSI SER-ACNC: 1.12 MIU/ML (ref 0.36–3.74)
URATE SERPL-MCNC: 3.1 MG/DL
VLDLC SERPL CALC-MCNC: 33 MG/DL (ref 0–30)
WBC # BLD AUTO: 7.6 X10(3) UL (ref 4–11)

## 2022-03-11 PROCEDURE — 36415 COLL VENOUS BLD VENIPUNCTURE: CPT

## 2022-03-11 PROCEDURE — 85025 COMPLETE CBC W/AUTO DIFF WBC: CPT

## 2022-03-11 PROCEDURE — 83036 HEMOGLOBIN GLYCOSYLATED A1C: CPT

## 2022-03-11 PROCEDURE — 80053 COMPREHEN METABOLIC PANEL: CPT

## 2022-03-11 PROCEDURE — 80061 LIPID PANEL: CPT

## 2022-03-11 PROCEDURE — 84550 ASSAY OF BLOOD/URIC ACID: CPT

## 2022-03-11 PROCEDURE — 84443 ASSAY THYROID STIM HORMONE: CPT

## 2022-03-12 LAB — SARS-COV-2 RNA RESP QL NAA+PROBE: NOT DETECTED

## 2022-03-14 ENCOUNTER — ORDER TRANSCRIPTION (OUTPATIENT)
Dept: ADMINISTRATIVE | Facility: HOSPITAL | Age: 57
End: 2022-03-14

## 2022-03-14 ENCOUNTER — LAB ENCOUNTER (OUTPATIENT)
Dept: LAB | Facility: HOSPITAL | Age: 57
End: 2022-03-14
Attending: STUDENT IN AN ORGANIZED HEALTH CARE EDUCATION/TRAINING PROGRAM
Payer: COMMERCIAL

## 2022-03-14 DIAGNOSIS — Z01.818 PRE-PROCEDURAL EXAMINATION: ICD-10-CM

## 2022-03-14 DIAGNOSIS — Z01.818 PRE-OP TESTING: ICD-10-CM

## 2022-03-14 DIAGNOSIS — Z11.59 SCREENING FOR VIRAL DISEASE: ICD-10-CM

## 2022-03-14 PROBLEM — K57.30 COLON, DIVERTICULOSIS: Status: ACTIVE | Noted: 2022-03-14

## 2022-03-14 PROBLEM — Z86.0101 HISTORY OF ADENOMATOUS POLYP OF COLON: Status: ACTIVE | Noted: 2022-03-14

## 2022-03-14 PROBLEM — Z86.010 HISTORY OF ADENOMATOUS POLYP OF COLON: Status: ACTIVE | Noted: 2022-03-14

## 2022-03-14 LAB — SARS-COV-2 RNA RESP QL NAA+PROBE: NOT DETECTED

## 2022-03-15 PROBLEM — Z80.0 FAMILY HISTORY OF COLON CANCER: Status: ACTIVE | Noted: 2022-03-15

## 2022-03-15 PROBLEM — K63.5 COLON POLYPS: Status: ACTIVE | Noted: 2022-03-15

## 2022-04-18 NOTE — TELEPHONE ENCOUNTER
From: Prema Gates. Sent: 9/13/2017 2:58 PM CDT  Subject: Medication Renewal Request    Toni Trevino. would like a refill of the following medications:     Testosterone (TESTIM) 50 MG/5GM (1%) Transdermal Gel Leonardo Rivera MD]   Patient Com
His axiron didn't work  I told him to his face during the visit we are changing him from axiron to testim because he was saying axiron didn't work    If the pharmacy doesn't cover it then patient is out of luck and can stay on axiron at 2 pumps a day
Patient is inquiring why testosterone type was changed, pt states pharmacy would not fill his prescription. Patient was previously on [address] and was changed to Testim.  Please advise         Medication Detail     Medication Quantity Refills Start End   Test
Per pharmacy prior authorization is required for Testosterone (testim) 50mg/5gm.  Per pharmacist, insurance is requesting the reason for why the type of testosterone was changed and this needs to be included in the P.A.    [phone]   ID# [id number]    Pl
Statement Selected

## 2022-04-20 RX ORDER — SEMAGLUTIDE 2.4 MG/.75ML
2.4 INJECTION, SOLUTION SUBCUTANEOUS WEEKLY
Qty: 3 ML | Refills: 0 | Status: SHIPPED | OUTPATIENT
Start: 2022-04-20 | End: 2022-05-12

## 2022-05-22 DIAGNOSIS — I10 ESSENTIAL HYPERTENSION: ICD-10-CM

## 2022-05-22 DIAGNOSIS — E66.01 SEVERE OBESITY WITH BODY MASS INDEX (BMI) OF 35.0 TO 39.9 WITH SERIOUS COMORBIDITY (HCC): ICD-10-CM

## 2022-05-22 DIAGNOSIS — R73.03 PRE-DIABETES: ICD-10-CM

## 2022-05-22 DIAGNOSIS — Z51.81 ENCOUNTER FOR THERAPEUTIC DRUG MONITORING: ICD-10-CM

## 2022-05-22 RX ORDER — ALPRAZOLAM 0.5 MG/1
TABLET ORAL
Qty: 30 TABLET | Refills: 1 | Status: SHIPPED | OUTPATIENT
Start: 2022-05-22

## 2022-05-24 RX ORDER — EMPAGLIFLOZIN 10 MG/1
TABLET, FILM COATED ORAL
Qty: 90 TABLET | Refills: 1 | Status: SHIPPED | OUTPATIENT
Start: 2022-05-24 | End: 2022-08-16

## 2022-05-24 RX ORDER — SEMAGLUTIDE 2.4 MG/.75ML
2.4 INJECTION, SOLUTION SUBCUTANEOUS WEEKLY
Qty: 3 ML | Refills: 1 | Status: SHIPPED | OUTPATIENT
Start: 2022-05-24 | End: 2022-08-08

## 2022-05-30 DIAGNOSIS — Z51.81 ENCOUNTER FOR THERAPEUTIC DRUG MONITORING: ICD-10-CM

## 2022-05-30 DIAGNOSIS — E66.01 SEVERE OBESITY WITH BODY MASS INDEX (BMI) OF 35.0 TO 39.9 WITH SERIOUS COMORBIDITY (HCC): ICD-10-CM

## 2022-05-30 DIAGNOSIS — R73.03 PRE-DIABETES: ICD-10-CM

## 2022-05-30 DIAGNOSIS — I10 ESSENTIAL HYPERTENSION: ICD-10-CM

## 2022-05-31 RX ORDER — LIRAGLUTIDE 6 MG/ML
3 INJECTION, SOLUTION SUBCUTANEOUS DAILY
Refills: 1 | OUTPATIENT
Start: 2022-05-31

## 2022-06-15 ENCOUNTER — TELEPHONE (OUTPATIENT)
Dept: INTERNAL MEDICINE CLINIC | Facility: CLINIC | Age: 57
End: 2022-06-15

## 2022-06-15 NOTE — TELEPHONE ENCOUNTER
Patient's wife left message asking about the status of PA for phentermine. I called her back and apologized to let her know we do not do pa's for this medication and to pay out of pocket and use coupon from [facility]. She then said she got a notice about Wegovy. I did not receive the notice. She will fax to our office today so I can begin working on [place] for Mila.

## 2022-06-26 DIAGNOSIS — E66.01 SEVERE OBESITY WITH BODY MASS INDEX (BMI) OF 35.0 TO 39.9 WITH SERIOUS COMORBIDITY (HCC): ICD-10-CM

## 2022-06-26 DIAGNOSIS — Z51.81 ENCOUNTER FOR THERAPEUTIC DRUG MONITORING: ICD-10-CM

## 2022-06-26 NOTE — TELEPHONE ENCOUNTER
Requesting phentermine  LOV: 3/9/22  RTC: 3 months  Last Relevant Labs: na  Filled: 3/9/22 #30 with 2 refills [place]    Future Appointments   Date Time Provider Alba Skinner   8/15/2022  8:00 AM TEO Multani [id number]     Above appt made on 5/23/22.

## 2022-06-27 RX ORDER — PHENTERMINE HYDROCHLORIDE 37.5 MG/1
TABLET ORAL
Qty: 30 TABLET | Refills: 0 | Status: SHIPPED | OUTPATIENT
Start: 2022-06-27

## 2022-08-05 DIAGNOSIS — E66.01 SEVERE OBESITY WITH BODY MASS INDEX (BMI) OF 35.0 TO 39.9 WITH SERIOUS COMORBIDITY (HCC): ICD-10-CM

## 2022-08-05 DIAGNOSIS — I10 ESSENTIAL HYPERTENSION: ICD-10-CM

## 2022-08-05 DIAGNOSIS — R73.03 PRE-DIABETES: ICD-10-CM

## 2022-08-05 DIAGNOSIS — Z51.81 ENCOUNTER FOR THERAPEUTIC DRUG MONITORING: ICD-10-CM

## 2022-08-07 RX ORDER — ALPRAZOLAM 0.5 MG/1
TABLET ORAL
Qty: 30 TABLET | Refills: 1 | Status: SHIPPED | OUTPATIENT
Start: 2022-08-07

## 2022-08-08 ENCOUNTER — TELEPHONE (OUTPATIENT)
Dept: INTERNAL MEDICINE CLINIC | Facility: CLINIC | Age: 57
End: 2022-08-08

## 2022-08-08 DIAGNOSIS — E66.01 SEVERE OBESITY WITH BODY MASS INDEX (BMI) OF 35.0 TO 39.9 WITH SERIOUS COMORBIDITY (HCC): ICD-10-CM

## 2022-08-08 DIAGNOSIS — Z51.81 ENCOUNTER FOR THERAPEUTIC DRUG MONITORING: ICD-10-CM

## 2022-08-08 DIAGNOSIS — I10 ESSENTIAL HYPERTENSION: ICD-10-CM

## 2022-08-08 DIAGNOSIS — R73.03 PRE-DIABETES: ICD-10-CM

## 2022-08-08 RX ORDER — SEMAGLUTIDE 2.4 MG/.75ML
2.4 INJECTION, SOLUTION SUBCUTANEOUS WEEKLY
Qty: 3 ML | Refills: 0 | Status: SHIPPED | OUTPATIENT
Start: 2022-08-08 | End: 2022-09-12

## 2022-08-08 RX ORDER — PHENTERMINE HYDROCHLORIDE 37.5 MG/1
TABLET ORAL
Qty: 30 TABLET | Refills: 0 | Status: SHIPPED | OUTPATIENT
Start: 2022-08-08

## 2022-08-08 NOTE — TELEPHONE ENCOUNTER
Spoke to Adam from [address] [place] [address] Road to initiate PA for wegovy, answered clinical questions over the phone. approved 3/8/2023.   CASE ID [id number]

## 2022-08-08 NOTE — TELEPHONE ENCOUNTER
Requesting phentermine  LOV: 3/9/22  RTC: 3 months  Filled: 6/27/22 #30 with 0 refills    Future Appointments   Date Time Provider Alba Skinner   8/15/2022  8:00 AM TEO Martinez [id number]

## 2022-08-14 DIAGNOSIS — R73.03 PRE-DIABETES: ICD-10-CM

## 2022-08-14 DIAGNOSIS — Z51.81 ENCOUNTER FOR THERAPEUTIC DRUG MONITORING: ICD-10-CM

## 2022-08-14 DIAGNOSIS — E66.01 SEVERE OBESITY WITH BODY MASS INDEX (BMI) OF 35.0 TO 39.9 WITH SERIOUS COMORBIDITY (HCC): ICD-10-CM

## 2022-08-14 DIAGNOSIS — I10 ESSENTIAL HYPERTENSION: ICD-10-CM

## 2022-08-16 NOTE — TELEPHONE ENCOUNTER
Patient cancelled his appt 8/15/22    Future Appointments   Date Time Provider Alba Skinner   10/12/2022  5:20 PM TEO Anaya EMG [facility]

## 2022-09-12 DIAGNOSIS — Z51.81 ENCOUNTER FOR THERAPEUTIC DRUG MONITORING: ICD-10-CM

## 2022-09-12 DIAGNOSIS — E66.01 SEVERE OBESITY WITH BODY MASS INDEX (BMI) OF 35.0 TO 39.9 WITH SERIOUS COMORBIDITY (HCC): ICD-10-CM

## 2022-09-12 DIAGNOSIS — E78.1 HYPERTRIGLYCERIDEMIA: ICD-10-CM

## 2022-09-12 DIAGNOSIS — R73.03 PRE-DIABETES: ICD-10-CM

## 2022-09-12 DIAGNOSIS — I10 ESSENTIAL HYPERTENSION: ICD-10-CM

## 2022-09-12 RX ORDER — FEBUXOSTAT 40 MG/1
TABLET, FILM COATED ORAL
Qty: 90 TABLET | Refills: 1 | Status: SHIPPED | OUTPATIENT
Start: 2022-09-12

## 2022-09-12 NOTE — TELEPHONE ENCOUNTER
Requesting Wegovy, metformin, phentermine  LOV: 3/9/22  RTC: 3 months  Last Relevant Labs: 3/11/22  Filled: 8/8/22 #30 with 0 refills   Phentermine  last [place] 8[place]14/22 [place]  Filled: 8/8/22 #3ml with 0 refills Wegovy  Filled: 3/9/22 #180 with 1 refills  metformin    Future Appointments   Date Time Provider Alba Skinner   10/12/2022  5:20 PM TEO Kat EMG [facility]

## 2022-09-13 RX ORDER — SEMAGLUTIDE 2.4 MG/.75ML
2.4 INJECTION, SOLUTION SUBCUTANEOUS WEEKLY
Qty: 3 ML | Refills: 0 | Status: SHIPPED | OUTPATIENT
Start: 2022-09-13 | End: 2022-10-12

## 2022-09-13 RX ORDER — PHENTERMINE HYDROCHLORIDE 37.5 MG/1
37.5 TABLET ORAL EVERY MORNING
Qty: 30 TABLET | Refills: 0 | Status: SHIPPED | OUTPATIENT
Start: 2022-09-13 | End: 2022-10-12

## 2022-09-27 NOTE — TELEPHONE ENCOUNTER
Problem: At Risk for Falls  Goal: # Patient does not fall  Outcome: Outcome Met, Continue evaluating goal progress toward completion     Problem: At Risk for Falls  Goal: # Takes action to control fall-related risks  Outcome: Outcome Met, Continue evaluating goal progress toward completion     Problem: At Risk for Falls  Goal: # Verbalizes understanding of fall risk/precautions  Description: Document education using the patient education activity  Outcome: Outcome Met, Continue evaluating goal progress toward completion     Problem: At Risk for Injury Due to Fall  Goal: # Patient does not fall  Outcome: Outcome Met, Continue evaluating goal progress toward completion     Problem: Pressure Injury, Risk for  Goal: No new pressure injury (PI) development  Outcome: Outcome Met, Continue evaluating goal progress toward completion     Problem: Pressure Injury, Risk for  Goal: # Verbalizes understanding of PI risk factors and prevention strategies  Description: Document education using the patient education activity.   Outcome: Outcome Not Met, Continue to Monitor     Problem: Pressure Injury Actual  Goal: # Verbalizes pressure injury management  Description: Document education using the patient education activity.  Outcome: Outcome Not Met, Continue to Monitor     Problem: Non-Pressure Injury Wound  Goal: # No deterioration in wound  Outcome: Outcome Not Met, Continue to Monitor     Problem: Non-Pressure Injury Wound  Goal: # Verbalizes understanding of wound and wound care  Description: If abnormality is a skin tear, avoid using tape on skin including transparent dressings. Document education using the patient education activity.  Outcome: Outcome Not Met, Continue to Monitor      Ok for twice daily

## 2022-10-12 ENCOUNTER — OFFICE VISIT (OUTPATIENT)
Dept: INTERNAL MEDICINE CLINIC | Facility: CLINIC | Age: 57
End: 2022-10-12
Payer: COMMERCIAL

## 2022-10-12 VITALS
HEART RATE: 79 BPM | HEIGHT: 70 IN | BODY MASS INDEX: 28.06 KG/M2 | SYSTOLIC BLOOD PRESSURE: 120 MMHG | WEIGHT: 196 LBS | RESPIRATION RATE: 16 BRPM | OXYGEN SATURATION: 98 % | DIASTOLIC BLOOD PRESSURE: 76 MMHG

## 2022-10-12 DIAGNOSIS — I10 ESSENTIAL HYPERTENSION: ICD-10-CM

## 2022-10-12 DIAGNOSIS — E78.1 HYPERTRIGLYCERIDEMIA: ICD-10-CM

## 2022-10-12 DIAGNOSIS — R73.03 PRE-DIABETES: ICD-10-CM

## 2022-10-12 DIAGNOSIS — E66.01 SEVERE OBESITY WITH BODY MASS INDEX (BMI) OF 35.0 TO 39.9 WITH SERIOUS COMORBIDITY (HCC): ICD-10-CM

## 2022-10-12 DIAGNOSIS — Z51.81 ENCOUNTER FOR THERAPEUTIC DRUG MONITORING: Primary | ICD-10-CM

## 2022-10-12 RX ORDER — SEMAGLUTIDE 2.4 MG/.75ML
2.4 INJECTION, SOLUTION SUBCUTANEOUS WEEKLY
Qty: 9 ML | Refills: 1 | Status: SHIPPED | OUTPATIENT
Start: 2022-10-12

## 2022-10-12 RX ORDER — PHENTERMINE HYDROCHLORIDE 37.5 MG/1
37.5 TABLET ORAL EVERY MORNING
Qty: 30 TABLET | Refills: 2 | Status: SHIPPED | OUTPATIENT
Start: 2022-10-12

## 2022-10-12 RX ORDER — TOPIRAMATE 50 MG/1
50 TABLET, FILM COATED ORAL 2 TIMES DAILY
Qty: 180 TABLET | Refills: 1 | Status: SHIPPED | OUTPATIENT
Start: 2022-10-12

## 2022-10-16 RX ORDER — NIFEDIPINE 60 MG/1
TABLET, FILM COATED, EXTENDED RELEASE ORAL
Qty: 90 TABLET | Refills: 1 | Status: SHIPPED | OUTPATIENT
Start: 2022-10-16

## 2022-10-16 RX ORDER — ALPRAZOLAM 0.5 MG/1
TABLET ORAL
Qty: 30 TABLET | Refills: 1 | Status: SHIPPED | OUTPATIENT
Start: 2022-10-16

## 2022-10-30 NOTE — TELEPHONE ENCOUNTER
Requesting Metformin  mg  LOV: 5/11/20  RTC: 3 months  Last Relevant Labs: 8/20/19  Filled: 4/29/20 #180 with 0 refills    Future Appointments   Date Time Provider Alba Skinner   8/11/2020  3:40 PM TEO Huerta EMG [facility] no pain, swelling or deformity of joints

## 2022-11-02 NOTE — TELEPHONE ENCOUNTER
HISTORY: S/P CABG (7-29-22), CAD, HTN , HLP, CVD, HX OF STROKE, DM, EF=60%(7-19-22)    ANTHROPOMETRICS:     PRE   Height (in) 69   Weight (lb) 229   BMI 33.9   Abdominal Girth (in) 49.5   % Body Fat 20.3%       LAB RESULTS:    Lab Results   Component Value Date    HGB 12.9 (L) 11/01/2022     Lab Results   Component Value Date    HCT 41.2 11/01/2022     Lab Results   Component Value Date    MPV 10.1 11/01/2022       Lab Results   Component Value Date    CHOL 129 11/01/2022     Lab Results   Component Value Date    HDL 61 11/01/2022     Lab Results   Component Value Date    LDLCALC 47.0 (L) 11/01/2022     Lab Results   Component Value Date    TRIG 105 11/01/2022     Lab Results   Component Value Date    CHOLHDL 47.3 11/01/2022       Lab Results   Component Value Date    GLUF 106 11/01/2022     Lab Results   Component Value Date    HGBA1C 5.9 (H) 07/20/2022        Lab Results   Component Value Date    HSCRP 1.18 11/01/2022         TERESA SCORES:     PRE   Anxiety 6   Depression 5   Somatic 2   Hostility 3     SF-36 SCORES:     PRE   Physical Function 20   Social Function 6   Mental Health 22   Pain 6   Change in Health 4   Physical Role Limitation 0   Mental Role Limitation 0   Energy/Fatigue 18   Health Perceptions 16   Total Score 92     PHQ-9:  PHQ-9 Depression Patient Health Questionnaire 11/10/2022   Over the last two weeks how often have you been bothered by little interest or pleasure in doing things 2   Over the last two weeks how often have you been bothered by feeling down, depressed or hopeless 0   Over the last two weeks how often have you been bothered by trouble falling or staying asleep, or sleeping too much 1   Over the last two weeks how often have you been bothered by feeling tired or having little energy 1   Over the last two weeks how often have you been bothered by a poor appetite or overeating 3   Over the last two weeks how often have you been bothered by feeling bad about yourself - or that you  Please verify refill needs, and update provider location. Thanks. are a failure or have let yourself or your family down 1   Over the last two weeks how often have you been bothered by trouble concentrating on things, such as reading the newspaper or watching television 1   Over the last two weeks how often have you been bothered by moving or speaking so slowly that other people could have noticed. 0   Over the last two weeks how often have you been bothered by thoughts that you would be better off dead, or of hurting yourself 0   If you checked off any problems, how difficult have these problems made it for you to do your work, take care of things at home or get along with other people? Somewhat difficult   Total Score 9             EDUCATION SCORES:     PRE   Education Score 60

## 2022-12-04 DIAGNOSIS — I10 ESSENTIAL HYPERTENSION: ICD-10-CM

## 2022-12-04 DIAGNOSIS — R73.03 PRE-DIABETES: ICD-10-CM

## 2022-12-04 DIAGNOSIS — E66.01 SEVERE OBESITY WITH BODY MASS INDEX (BMI) OF 35.0 TO 39.9 WITH SERIOUS COMORBIDITY (HCC): ICD-10-CM

## 2022-12-04 DIAGNOSIS — Z51.81 ENCOUNTER FOR THERAPEUTIC DRUG MONITORING: ICD-10-CM

## 2022-12-04 RX ORDER — SEMAGLUTIDE 2.4 MG/.75ML
2.4 INJECTION, SOLUTION SUBCUTANEOUS WEEKLY
Refills: 0 | OUTPATIENT
Start: 2022-12-04

## 2022-12-04 NOTE — TELEPHONE ENCOUNTER
Requesting wegovy 2.4 mg  LOV: 10/12/22  RTC: 3 months  Last Relevant Labs: na  Filled: 10/12/22 #9ml with 1 refills    Future Appointments   Date Time Provider Alba Skinner   1/11/2023  3:20 PM Francisco Apley, APRN EMGWEI [facility] [address]     Denied refill as it is receipt confirmed at the pharmacy requesting. 6 month order sent in October.

## 2022-12-05 RX ORDER — FLUOXETINE HYDROCHLORIDE 40 MG/1
CAPSULE ORAL
Qty: 90 CAPSULE | Refills: 3 | Status: SHIPPED | OUTPATIENT
Start: 2022-12-05

## 2022-12-31 DIAGNOSIS — E66.01 SEVERE OBESITY WITH BODY MASS INDEX (BMI) OF 35.0 TO 39.9 WITH SERIOUS COMORBIDITY (HCC): ICD-10-CM

## 2022-12-31 DIAGNOSIS — Z51.81 ENCOUNTER FOR THERAPEUTIC DRUG MONITORING: ICD-10-CM

## 2023-01-02 RX ORDER — ALPRAZOLAM 0.5 MG/1
TABLET ORAL
Qty: 30 TABLET | Refills: 1 | Status: SHIPPED | OUTPATIENT
Start: 2023-01-02

## 2023-01-03 RX ORDER — PHENTERMINE HYDROCHLORIDE 37.5 MG/1
TABLET ORAL
Qty: 30 TABLET | Refills: 0 | Status: SHIPPED | OUTPATIENT
Start: 2023-01-03

## 2023-01-11 ENCOUNTER — OFFICE VISIT (OUTPATIENT)
Dept: INTERNAL MEDICINE CLINIC | Facility: CLINIC | Age: 58
End: 2023-01-11
Payer: COMMERCIAL

## 2023-01-11 VITALS
HEART RATE: 75 BPM | BODY MASS INDEX: 27.35 KG/M2 | OXYGEN SATURATION: 97 % | RESPIRATION RATE: 16 BRPM | SYSTOLIC BLOOD PRESSURE: 126 MMHG | HEIGHT: 70 IN | DIASTOLIC BLOOD PRESSURE: 80 MMHG | WEIGHT: 191 LBS

## 2023-01-11 DIAGNOSIS — E66.01 SEVERE OBESITY WITH BODY MASS INDEX (BMI) OF 35.0 TO 39.9 WITH SERIOUS COMORBIDITY (HCC): ICD-10-CM

## 2023-01-11 DIAGNOSIS — E78.2 MIXED HYPERLIPIDEMIA: ICD-10-CM

## 2023-01-11 DIAGNOSIS — Z51.81 ENCOUNTER FOR THERAPEUTIC DRUG MONITORING: Primary | ICD-10-CM

## 2023-01-11 DIAGNOSIS — I10 ESSENTIAL HYPERTENSION: ICD-10-CM

## 2023-01-11 DIAGNOSIS — R73.03 PRE-DIABETES: ICD-10-CM

## 2023-01-11 PROCEDURE — 3079F DIAST BP 80-89 MM HG: CPT | Performed by: NURSE PRACTITIONER

## 2023-01-11 PROCEDURE — 3008F BODY MASS INDEX DOCD: CPT | Performed by: NURSE PRACTITIONER

## 2023-01-11 PROCEDURE — 3074F SYST BP LT 130 MM HG: CPT | Performed by: NURSE PRACTITIONER

## 2023-01-11 PROCEDURE — 99213 OFFICE O/P EST LOW 20 MIN: CPT | Performed by: NURSE PRACTITIONER

## 2023-01-11 RX ORDER — PHENTERMINE HYDROCHLORIDE 37.5 MG/1
37.5 TABLET ORAL EVERY MORNING
Qty: 30 TABLET | Refills: 2 | Status: SHIPPED | OUTPATIENT
Start: 2023-01-11

## 2023-01-27 NOTE — PATIENT INSTRUCTIONS
Continue making lifestyle changes that focus on good nutrition, regular exercise and stress management. Medication Plan: Continue current medication regimen.     Agreed upon goal/s before next office visit include: Modify your nutrition by incorporating
What Type Of Note Output Would You Prefer (Optional)?: Standard Output
Hpi Title: Evaluation of Skin Lesions
How Severe Are Your Spot(S)?: moderate

## 2023-02-15 ENCOUNTER — TELEPHONE (OUTPATIENT)
Dept: INTERNAL MEDICINE CLINIC | Facility: CLINIC | Age: 58
End: 2023-02-15

## 2023-02-15 NOTE — TELEPHONE ENCOUNTER
Patient's wife called to state that [employer] has reached out to us several times regarding patient's phentermine - they cannot get tablets and want okay to change to capsules. We have received nothing from [employer].  I called them now and they are on break until 2 pm.  I left detailed message telling them to change to capsules and left her BANDAR information and all. I called the patient's wife and let her know we never heard from them and I had to leave a message with them. She will let me know if there is any further problem.

## 2023-03-26 ENCOUNTER — TELEPHONE (OUTPATIENT)
Dept: INTERNAL MEDICINE CLINIC | Facility: CLINIC | Age: 58
End: 2023-03-26

## 2023-03-29 ENCOUNTER — PATIENT MESSAGE (OUTPATIENT)
Dept: INTERNAL MEDICINE CLINIC | Facility: CLINIC | Age: 58
End: 2023-03-29

## 2023-03-29 DIAGNOSIS — R93.1 ABNORMAL CT SCAN OF HEART: Primary | ICD-10-CM

## 2023-03-29 RX ORDER — FEBUXOSTAT 40 MG/1
TABLET, FILM COATED ORAL
Qty: 90 TABLET | Refills: 1 | Status: SHIPPED | OUTPATIENT
Start: 2023-03-29

## 2023-04-02 DIAGNOSIS — Z51.81 ENCOUNTER FOR THERAPEUTIC DRUG MONITORING: ICD-10-CM

## 2023-04-02 DIAGNOSIS — I10 ESSENTIAL HYPERTENSION: ICD-10-CM

## 2023-04-02 DIAGNOSIS — E66.01 SEVERE OBESITY WITH BODY MASS INDEX (BMI) OF 35.0 TO 39.9 WITH SERIOUS COMORBIDITY (HCC): ICD-10-CM

## 2023-04-02 DIAGNOSIS — R73.03 PRE-DIABETES: ICD-10-CM

## 2023-04-02 RX ORDER — NIFEDIPINE 60 MG/1
TABLET, FILM COATED, EXTENDED RELEASE ORAL
Qty: 90 TABLET | Refills: 1 | Status: SHIPPED | OUTPATIENT
Start: 2023-04-02

## 2023-04-02 RX ORDER — EMPAGLIFLOZIN 10 MG/1
TABLET, FILM COATED ORAL
Qty: 90 TABLET | Refills: 0 | Status: SHIPPED | OUTPATIENT
Start: 2023-04-02

## 2023-04-02 NOTE — TELEPHONE ENCOUNTER
Requesting jardiance 10 mg  LOV: 1/11/23   RTC: 3 months  Last Relevant Labs: 3/11/22  Filled: 10/12/22 #90 with 0 refills    Future Appointments   Date Time Provider Alba Susanne   4/3/2023  9:00 AM TEO Domínguez EMGWEI [id number]   4/25/2023  3:00 PM Annika Enciso MD EMG [address] [facility]

## 2023-04-03 ENCOUNTER — OFFICE VISIT (OUTPATIENT)
Dept: INTERNAL MEDICINE CLINIC | Facility: CLINIC | Age: 58
End: 2023-04-03
Payer: COMMERCIAL

## 2023-04-03 VITALS
SYSTOLIC BLOOD PRESSURE: 114 MMHG | HEART RATE: 75 BPM | WEIGHT: 190 LBS | HEIGHT: 70 IN | DIASTOLIC BLOOD PRESSURE: 64 MMHG | RESPIRATION RATE: 18 BRPM | OXYGEN SATURATION: 97 % | BODY MASS INDEX: 27.2 KG/M2

## 2023-04-03 DIAGNOSIS — E78.2 MIXED HYPERLIPIDEMIA: ICD-10-CM

## 2023-04-03 DIAGNOSIS — I10 ESSENTIAL HYPERTENSION: ICD-10-CM

## 2023-04-03 DIAGNOSIS — Z51.81 ENCOUNTER FOR THERAPEUTIC DRUG MONITORING: Primary | ICD-10-CM

## 2023-04-03 DIAGNOSIS — R73.03 PRE-DIABETES: ICD-10-CM

## 2023-04-03 DIAGNOSIS — E66.01 SEVERE OBESITY WITH BODY MASS INDEX (BMI) OF 35.0 TO 39.9 WITH SERIOUS COMORBIDITY (HCC): ICD-10-CM

## 2023-04-03 PROCEDURE — 99214 OFFICE O/P EST MOD 30 MIN: CPT | Performed by: NURSE PRACTITIONER

## 2023-04-03 PROCEDURE — 3008F BODY MASS INDEX DOCD: CPT | Performed by: NURSE PRACTITIONER

## 2023-04-03 PROCEDURE — 3078F DIAST BP <80 MM HG: CPT | Performed by: NURSE PRACTITIONER

## 2023-04-03 PROCEDURE — 3074F SYST BP LT 130 MM HG: CPT | Performed by: NURSE PRACTITIONER

## 2023-04-03 RX ORDER — SEMAGLUTIDE 0.5 MG/.5ML
0.5 INJECTION, SOLUTION SUBCUTANEOUS WEEKLY
Qty: 2 ML | Refills: 0 | Status: SHIPPED | OUTPATIENT
Start: 2023-04-03 | End: 2023-04-25

## 2023-04-03 RX ORDER — TOPIRAMATE 50 MG/1
50 TABLET, FILM COATED ORAL 2 TIMES DAILY
Qty: 180 TABLET | Refills: 1 | Status: SHIPPED | OUTPATIENT
Start: 2023-04-03

## 2023-04-03 RX ORDER — PHENTERMINE HYDROCHLORIDE 37.5 MG/1
37.5 TABLET ORAL EVERY MORNING
Qty: 30 TABLET | Refills: 2 | Status: SHIPPED | OUTPATIENT
Start: 2023-04-03

## 2023-04-25 ENCOUNTER — OFFICE VISIT (OUTPATIENT)
Dept: FAMILY MEDICINE CLINIC | Facility: CLINIC | Age: 58
End: 2023-04-25
Payer: COMMERCIAL

## 2023-04-25 VITALS
HEIGHT: 70 IN | DIASTOLIC BLOOD PRESSURE: 70 MMHG | RESPIRATION RATE: 16 BRPM | SYSTOLIC BLOOD PRESSURE: 124 MMHG | OXYGEN SATURATION: 99 % | HEART RATE: 77 BPM | BODY MASS INDEX: 28.06 KG/M2 | WEIGHT: 196 LBS | TEMPERATURE: 98 F

## 2023-04-25 DIAGNOSIS — Z00.00 WELLNESS EXAMINATION: Primary | ICD-10-CM

## 2023-04-25 DIAGNOSIS — Z12.5 SCREENING FOR PROSTATE CANCER: ICD-10-CM

## 2023-04-25 DIAGNOSIS — F17.200 TOBACCO DEPENDENCE: ICD-10-CM

## 2023-04-25 PROBLEM — E78.2 MIXED HYPERLIPIDEMIA: Status: RESOLVED | Noted: 2018-11-30 | Resolved: 2023-04-25

## 2023-04-25 PROBLEM — F33.2 SEVERE RECURRENT MAJOR DEPRESSION WITHOUT PSYCHOTIC FEATURES (HCC): Status: RESOLVED | Noted: 2018-11-29 | Resolved: 2023-04-25

## 2023-04-25 PROBLEM — E66.89 HYPOGONADAL OBESITY: Status: RESOLVED | Noted: 2017-09-12 | Resolved: 2023-04-25

## 2023-04-25 PROBLEM — E86.0 DEHYDRATION: Status: RESOLVED | Noted: 2018-05-31 | Resolved: 2023-04-25

## 2023-04-25 PROBLEM — M77.11 LATERAL EPICONDYLITIS, RIGHT ELBOW: Status: RESOLVED | Noted: 2017-05-30 | Resolved: 2023-04-25

## 2023-04-25 PROBLEM — R73.01 IFG (IMPAIRED FASTING GLUCOSE): Status: RESOLVED | Noted: 2017-09-12 | Resolved: 2023-04-25

## 2023-04-25 PROBLEM — E66.8 HYPOGONADAL OBESITY: Status: RESOLVED | Noted: 2017-09-12 | Resolved: 2023-04-25

## 2023-04-25 PROBLEM — E66.01 SEVERE OBESITY WITH BODY MASS INDEX (BMI) OF 35.0 TO 39.9 WITH SERIOUS COMORBIDITY (HCC): Status: RESOLVED | Noted: 2017-10-16 | Resolved: 2023-04-25

## 2023-04-25 PROBLEM — E78.1 HYPERTRIGLYCERIDEMIA: Status: RESOLVED | Noted: 2019-10-26 | Resolved: 2023-04-25

## 2023-04-25 PROBLEM — M75.21 BICEPS TENDINITIS ON RIGHT: Status: RESOLVED | Noted: 2017-05-30 | Resolved: 2023-04-25

## 2023-04-25 PROCEDURE — 3074F SYST BP LT 130 MM HG: CPT | Performed by: FAMILY MEDICINE

## 2023-04-25 PROCEDURE — 3008F BODY MASS INDEX DOCD: CPT | Performed by: FAMILY MEDICINE

## 2023-04-25 PROCEDURE — 99396 PREV VISIT EST AGE 40-64: CPT | Performed by: FAMILY MEDICINE

## 2023-04-25 PROCEDURE — 3078F DIAST BP <80 MM HG: CPT | Performed by: FAMILY MEDICINE

## 2023-04-25 PROCEDURE — 99213 OFFICE O/P EST LOW 20 MIN: CPT | Performed by: FAMILY MEDICINE

## 2023-04-25 RX ORDER — BUPROPION HYDROCHLORIDE 150 MG/1
150 TABLET ORAL DAILY
Qty: 90 TABLET | Refills: 1 | Status: SHIPPED | OUTPATIENT
Start: 2023-04-25

## 2023-04-25 NOTE — PATIENT INSTRUCTIONS
Thank you for choosing Perry Doty MD at [place] [address]  To Do: Jacob Strong.  1. Please see age appropriate health prevention below    THE [facility] Reference Lab is located in [address]. Monday, Tuesday & Friday - 8 a.m. to 4 p.m. Wednesday, Thursday - 7 a.m. to 3 p.m. The lab is closed daily from 12 p.m.-12:30 p.m. Saturday lab hours by appointment. Call [phone] to schedule the appointment. Please signup for Acustream, which is electronic access to your record if you have not done so. All your results will post on there. [email or web address]   You can NOW use Acustream to book your appointments with us, or consider using open access scheduling which is through the edward website [email or web address] and type in Perry Doty MD and follow the links for \"Schedule Online Now\"    To schedule Imaging or tests at [facility] Scheduling [phone], Go to [facility] A ER Building (For example: CT scans, X rays, Ultrasound, MRI)  Cardiac Testing in ER building Building A second floor Cardiac Testing [phone] (For example: Holter Monitor, Cardiac Stress tests,Event Monitor, or 2D Echocardiograms)  [facility] call [phone] usually in [facility] A  Walk in Clinic in [place] at [facility]. [address] Mon-Fri at 8am-7:30 p.m., and Sat/Sun 9:00a. m.-4:30 p.m. Also at [address]  Call [phone] for info     Please call our office about any questions regarding your treatment/medicines/tests as a result of today's visit. For your safety, read the entire package insert of all medicines prescribed to you and be aware of all of the risks of treatment even beyond those discussed today. All therapies have potential risk of harm or side effects or medication interactions.   It is your duty and for your safety to discuss with the pharmacist and our office with questions, and to notify us and stop treatment if problems arise, but know that our intention is that the benefits outweigh those potential risks and we strive to make you healthier and to improve your quality of life. Referrals, and Radiology Information:    If your insurance requires a referral to a specialist, please allow 5 business days to process your referral request.    If Bernadette Camacho MD orders a CT or MRI, it may take up to 10 business days to receive approval from your insurance company. Once our office has called informing you that the insurance company approved your testing, please call Central Scheduling at [phone]  Please allow our office 5 business days to contact you regarding any testing results. Refill policies:   Allow 3 business days for refills; controlled substances may take longer and must be picked up from the office in person. Narcotic medications can only be filled in 30 day increments and must be refilled at an office visit only. If your prescription is due for a refill, you may be due for a follow-up appointment. We cannot refill your maintenance medications at a preventative wellness visit. To best provide you care, patients receiving maintenance medications need to be seen at least twice a year.

## 2023-05-20 DIAGNOSIS — E66.01 SEVERE OBESITY WITH BODY MASS INDEX (BMI) OF 35.0 TO 39.9 WITH SERIOUS COMORBIDITY (HCC): ICD-10-CM

## 2023-05-20 DIAGNOSIS — I10 ESSENTIAL HYPERTENSION: ICD-10-CM

## 2023-05-20 DIAGNOSIS — E78.2 MIXED HYPERLIPIDEMIA: ICD-10-CM

## 2023-05-20 DIAGNOSIS — Z51.81 ENCOUNTER FOR THERAPEUTIC DRUG MONITORING: ICD-10-CM

## 2023-05-20 DIAGNOSIS — R73.03 PRE-DIABETES: ICD-10-CM

## 2023-05-21 RX ORDER — ALPRAZOLAM 0.5 MG/1
TABLET ORAL
Qty: 30 TABLET | Refills: 1 | Status: SHIPPED | OUTPATIENT
Start: 2023-05-21

## 2023-06-05 ENCOUNTER — LAB ENCOUNTER (OUTPATIENT)
Dept: LAB | Age: 58
End: 2023-06-05
Attending: FAMILY MEDICINE
Payer: COMMERCIAL

## 2023-06-05 DIAGNOSIS — I10 ESSENTIAL HYPERTENSION: ICD-10-CM

## 2023-06-05 DIAGNOSIS — R73.03 PRE-DIABETES: ICD-10-CM

## 2023-06-05 DIAGNOSIS — E66.01 SEVERE OBESITY WITH BODY MASS INDEX (BMI) OF 35.0 TO 39.9 WITH SERIOUS COMORBIDITY (HCC): ICD-10-CM

## 2023-06-05 DIAGNOSIS — Z00.00 WELLNESS EXAMINATION: ICD-10-CM

## 2023-06-05 DIAGNOSIS — Z51.81 ENCOUNTER FOR THERAPEUTIC DRUG MONITORING: ICD-10-CM

## 2023-06-05 DIAGNOSIS — Z12.5 SCREENING FOR PROSTATE CANCER: ICD-10-CM

## 2023-06-05 LAB
ALBUMIN SERPL-MCNC: 3.9 G/DL (ref 3.4–5)
ALBUMIN/GLOB SERPL: 1 {RATIO} (ref 1–2)
ALP LIVER SERPL-CCNC: 52 U/L
ALT SERPL-CCNC: 38 U/L
ANION GAP SERPL CALC-SCNC: 6 MMOL/L (ref 0–18)
AST SERPL-CCNC: 24 U/L (ref 15–37)
BASOPHILS # BLD AUTO: 0.08 X10(3) UL (ref 0–0.2)
BASOPHILS NFR BLD AUTO: 0.9 %
BILIRUB SERPL-MCNC: 0.5 MG/DL (ref 0.1–2)
BUN BLD-MCNC: 13 MG/DL (ref 7–18)
CALCIUM BLD-MCNC: 10.1 MG/DL (ref 8.5–10.1)
CHLORIDE SERPL-SCNC: 107 MMOL/L (ref 98–112)
CHOLEST SERPL-MCNC: 235 MG/DL (ref ?–200)
CO2 SERPL-SCNC: 26 MMOL/L (ref 21–32)
COMPLEXED PSA SERPL-MCNC: 3.64 NG/ML (ref ?–4)
CREAT BLD-MCNC: 1.27 MG/DL
EOSINOPHIL # BLD AUTO: 0.23 X10(3) UL (ref 0–0.7)
EOSINOPHIL NFR BLD AUTO: 2.7 %
ERYTHROCYTE [DISTWIDTH] IN BLOOD BY AUTOMATED COUNT: 14.4 %
EST. AVERAGE GLUCOSE BLD GHB EST-MCNC: 117 MG/DL (ref 68–126)
FASTING PATIENT LIPID ANSWER: YES
FASTING STATUS PATIENT QL REPORTED: YES
GFR SERPLBLD BASED ON 1.73 SQ M-ARVRAT: 66 ML/MIN/1.73M2 (ref 60–?)
GLOBULIN PLAS-MCNC: 4 G/DL (ref 2.8–4.4)
GLUCOSE BLD-MCNC: 94 MG/DL (ref 70–99)
HBA1C MFR BLD: 5.7 % (ref ?–5.7)
HCT VFR BLD AUTO: 47.4 %
HDLC SERPL-MCNC: 34 MG/DL (ref 40–59)
HGB BLD-MCNC: 14.3 G/DL
IMM GRANULOCYTES # BLD AUTO: 0.03 X10(3) UL (ref 0–1)
IMM GRANULOCYTES NFR BLD: 0.3 %
LDLC SERPL CALC-MCNC: 173 MG/DL (ref ?–100)
LYMPHOCYTES # BLD AUTO: 1.83 X10(3) UL (ref 1–4)
LYMPHOCYTES NFR BLD AUTO: 21.2 %
MCH RBC QN AUTO: 25.6 PG (ref 26–34)
MCHC RBC AUTO-ENTMCNC: 30.2 G/DL (ref 31–37)
MCV RBC AUTO: 84.9 FL
MONOCYTES # BLD AUTO: 0.46 X10(3) UL (ref 0.1–1)
MONOCYTES NFR BLD AUTO: 5.3 %
NEUTROPHILS # BLD AUTO: 6.02 X10 (3) UL (ref 1.5–7.7)
NEUTROPHILS # BLD AUTO: 6.02 X10(3) UL (ref 1.5–7.7)
NEUTROPHILS NFR BLD AUTO: 69.6 %
NONHDLC SERPL-MCNC: 201 MG/DL (ref ?–130)
OSMOLALITY SERPL CALC.SUM OF ELEC: 288 MOSM/KG (ref 275–295)
PLATELET # BLD AUTO: 311 10(3)UL (ref 150–450)
POTASSIUM SERPL-SCNC: 3.5 MMOL/L (ref 3.5–5.1)
PROT SERPL-MCNC: 7.9 G/DL (ref 6.4–8.2)
RBC # BLD AUTO: 5.58 X10(6)UL
SODIUM SERPL-SCNC: 139 MMOL/L (ref 136–145)
T4 FREE SERPL-MCNC: 0.8 NG/DL (ref 0.8–1.7)
TRIGL SERPL-MCNC: 149 MG/DL (ref 30–149)
TSI SER-ACNC: 0.97 MIU/ML (ref 0.36–3.74)
VLDLC SERPL CALC-MCNC: 30 MG/DL (ref 0–30)
WBC # BLD AUTO: 8.7 X10(3) UL (ref 4–11)

## 2023-06-05 PROCEDURE — 80050 GENERAL HEALTH PANEL: CPT | Performed by: FAMILY MEDICINE

## 2023-06-05 PROCEDURE — 83036 HEMOGLOBIN GLYCOSYLATED A1C: CPT | Performed by: NURSE PRACTITIONER

## 2023-06-05 PROCEDURE — 84153 ASSAY OF PSA TOTAL: CPT | Performed by: FAMILY MEDICINE

## 2023-06-05 PROCEDURE — 84439 ASSAY OF FREE THYROXINE: CPT | Performed by: FAMILY MEDICINE

## 2023-06-05 PROCEDURE — 80061 LIPID PANEL: CPT | Performed by: FAMILY MEDICINE

## 2023-06-09 DIAGNOSIS — E78.5 DYSLIPIDEMIA: Primary | ICD-10-CM

## 2023-06-09 DIAGNOSIS — I10 PRIMARY HYPERTENSION: ICD-10-CM

## 2023-06-09 RX ORDER — ATORVASTATIN CALCIUM 40 MG/1
40 TABLET, FILM COATED ORAL NIGHTLY
Qty: 90 TABLET | Refills: 0 | Status: SHIPPED | OUTPATIENT
Start: 2023-06-09

## 2023-06-24 ENCOUNTER — HOSPITAL ENCOUNTER (OUTPATIENT)
Dept: CT IMAGING | Age: 58
Discharge: HOME | End: 2023-06-24
Attending: FAMILY MEDICINE

## 2023-06-24 DIAGNOSIS — I10 PRIMARY HYPERTENSION: ICD-10-CM

## 2023-06-24 DIAGNOSIS — E78.5 DYSLIPIDEMIA: ICD-10-CM

## 2023-07-06 DIAGNOSIS — I10 ESSENTIAL HYPERTENSION: ICD-10-CM

## 2023-07-06 DIAGNOSIS — E66.01 SEVERE OBESITY WITH BODY MASS INDEX (BMI) OF 35.0 TO 39.9 WITH SERIOUS COMORBIDITY (HCC): ICD-10-CM

## 2023-07-06 DIAGNOSIS — Z51.81 ENCOUNTER FOR THERAPEUTIC DRUG MONITORING: ICD-10-CM

## 2023-07-06 DIAGNOSIS — R73.03 PRE-DIABETES: ICD-10-CM

## 2023-07-06 NOTE — TELEPHONE ENCOUNTER
Requesting   Requested Prescriptions     Pending Prescriptions Disp Refills    JARDIANCE 10 MG Oral Tab [Pharmacy Med Name: Cecilia Ser 10 MG TABLET] 90 tablet 0     Sig: TAKE 1 TABLET BY MOUTH EVERY DAY     LOV: 4/3/23  RTC: 3 months  Filled: 4/2/23 #90 with 0 refills    No future appointments.

## 2023-07-26 DIAGNOSIS — E78.2 MIXED HYPERLIPIDEMIA: ICD-10-CM

## 2023-07-26 DIAGNOSIS — Z51.81 ENCOUNTER FOR THERAPEUTIC DRUG MONITORING: ICD-10-CM

## 2023-07-26 DIAGNOSIS — E66.01 SEVERE OBESITY WITH BODY MASS INDEX (BMI) OF 35.0 TO 39.9 WITH SERIOUS COMORBIDITY (HCC): ICD-10-CM

## 2023-07-26 DIAGNOSIS — I10 ESSENTIAL HYPERTENSION: ICD-10-CM

## 2023-07-26 DIAGNOSIS — R73.03 PRE-DIABETES: ICD-10-CM

## 2023-07-26 RX ORDER — ALPRAZOLAM 0.5 MG/1
0.5 TABLET ORAL DAILY PRN
Qty: 30 TABLET | Refills: 1 | Status: SHIPPED | OUTPATIENT
Start: 2023-07-26

## 2023-07-26 NOTE — TELEPHONE ENCOUNTER
Requesting Alprazolam 0.5mg  LOV: 4/25/23  RTC: 6 months  Last Relevant Labs: 6/5/23  Filled: 5/21/23 #30 with 1 refills    No future appointments.     Per [place] , last dispensed 6/21/23 #30    Rx pended and routed for approval/denial

## 2023-07-26 NOTE — TELEPHONE ENCOUNTER
Requesting   Requested Prescriptions     Pending Prescriptions Disp Refills    WEGOVY 1 MG/0.5ML Subcutaneous Solution Auto-injector [Pharmacy Med Name: Magan Fajardo 1 MG/0.5 ML PEN]  0     Sig: INJECT 0.5 ML (1 MG TOTAL) INTO THE SKIN ONCE A WEEK FOR 4 DOSES.     LOV: 4/3/23  RTC: 3 months  Filled: 5/25/23 #2 with 0 refills    No future appointments.

## 2023-08-07 RX ORDER — SEMAGLUTIDE 1 MG/.5ML
1 INJECTION, SOLUTION SUBCUTANEOUS
Refills: 0 | OUTPATIENT
Start: 2023-08-07

## 2023-08-07 NOTE — TELEPHONE ENCOUNTER
Requesting phentermine   LOV: 4/3/23  RTC: 8 weeks   Last Relevant Labs:   Filled: 30 tab on 4/3/23 # with 2 refills    Future Appointments   Date Time Provider Alba Skinner   10/16/2023 10:00 AM TEO Ponce EMGWEI [id number]

## 2023-08-08 ENCOUNTER — PATIENT MESSAGE (OUTPATIENT)
Dept: FAMILY MEDICINE CLINIC | Facility: CLINIC | Age: 58
End: 2023-08-08

## 2023-08-08 ENCOUNTER — TELEPHONE (OUTPATIENT)
Dept: FAMILY MEDICINE CLINIC | Facility: CLINIC | Age: 58
End: 2023-08-08

## 2023-08-08 RX ORDER — PHENTERMINE HYDROCHLORIDE 37.5 MG/1
37.5 TABLET ORAL EVERY MORNING
Qty: 30 TABLET | Refills: 1 | Status: SHIPPED | OUTPATIENT
Start: 2023-08-08

## 2023-08-08 NOTE — TELEPHONE ENCOUNTER
Recd request from [facility] for recent labs that were done on 6-5-23. Pt is having upcoming surgery on 8-28-23. Will be jesus cryto, turbt, poss rpg, ureteroscopy, stent removal. Urology also faxed office visit notes and EKG results(from [facility]). Faxed lab results to [phone]. Paperwork given to provider for review. Copy sent to scan.

## 2023-08-21 ENCOUNTER — OFFICE VISIT (OUTPATIENT)
Dept: FAMILY MEDICINE CLINIC | Facility: CLINIC | Age: 58
End: 2023-08-21
Payer: COMMERCIAL

## 2023-08-21 VITALS
SYSTOLIC BLOOD PRESSURE: 124 MMHG | TEMPERATURE: 98 F | BODY MASS INDEX: 28.49 KG/M2 | DIASTOLIC BLOOD PRESSURE: 70 MMHG | OXYGEN SATURATION: 98 % | HEART RATE: 74 BPM | RESPIRATION RATE: 16 BRPM | WEIGHT: 199 LBS | HEIGHT: 70 IN

## 2023-08-21 DIAGNOSIS — Z01.818 PREOPERATIVE CLEARANCE: Primary | ICD-10-CM

## 2023-08-21 DIAGNOSIS — C67.9 MALIGNANT NEOPLASM OF URINARY BLADDER, UNSPECIFIED SITE (HCC): ICD-10-CM

## 2023-08-21 PROCEDURE — 3078F DIAST BP <80 MM HG: CPT | Performed by: FAMILY MEDICINE

## 2023-08-21 PROCEDURE — 3008F BODY MASS INDEX DOCD: CPT | Performed by: FAMILY MEDICINE

## 2023-08-21 PROCEDURE — 99214 OFFICE O/P EST MOD 30 MIN: CPT | Performed by: FAMILY MEDICINE

## 2023-08-21 PROCEDURE — 3074F SYST BP LT 130 MM HG: CPT | Performed by: FAMILY MEDICINE

## 2023-08-21 NOTE — PATIENT INSTRUCTIONS
Thank you for choosing Perry Doty MD at [place] [address]  To Do: Jacob Strong.  1. Considerations in the Preoperative period:   Surgery is a big deal.  Anesthesia and your medicines and medical issues all stress out your body. Therefore read about the below issues and ask questions. (Certain exceptions exist depending on your medical history, call the doctor back with questions or contact your specialist doctors as well)    Medications:  Medications that thin the blood pose a bleeding risk- No NSAIDS/nonsteroidal anti inflammatory drugs- meaning motrin, advil, ibuprofen, alleve, naproxen, or aspirin 1 week prior to surgery, but acetaminophen/tylenol for pain is ok. Herbals: Ephedra, garlic, ginkgo, ginseng, kava, [facility], and Valerian, should be held 3-5 days prior to surgery. Cardiovascular agents:   Ace Inhibitors (lisinopril, benazepril, ramipril etc.) or ARBs (diovan, valsartan, losartan, avapro etc.) should be held night before surgery if BP is good. Non statin cholesterol agents (fenofibrate, fish oils, niacin, zetia) held day before surgery. Statins are continued during surgery. Diuretics hold on day of surgery. Endocrine Hormonal Agents:  Oral Contraception, Birth Control, Hormone replacement therapy, and Estrogen Receptor Blockers (raloxifene, tamoxifen)- Hold 4-6 weeks before high risk surgeries with high risk of blood clots (venous thromboembolism)    Insulin and steroids- case by case basis- but your doses may have to be adjusted sp talk to the doctor about it. Short acting insulin novolog humalog is held during surgery, Long acting is typically decreased before and during surgery.     Blood Thinners:  Plavix, clopidegrel, prasugrel, ticagrelor, effient etc- held 7 days before unless directed differently by cardiology    Coumadin, or novel anticoagulants such as pradaxa, eliquis, xarelto etc.- discuss with your doctor or cardiologist    Psychotropic agents:  Serotinin reuptake inhibitors like zoloft, effexor, paxil, prozac, citalopram, remeron etc. For mood should be held 3 weeks before surgery if high risk of bleeding as these may increase risk of bleeding  Most other psychiatric meds like antipsychotic meds, anxiety meds such as xanax, clonopin can be continued    Opioids: Will affect anesthesia and pain management discuss with the anesthesiologist.    Antiretroviral agents such as those used for HIV need to be discussed    Neurologic Agents:  Patients with medications for seizures, Parkinson's, delirium, or Myasthenia gravis pose risks that need to be discussed with the doctor. Rheumatologic Immune Agents: Talk to your specialist doctor because  Sulfasalazine, azathioprine held 1 week before surgery  Leflunomide is held 2 weeks before surgery  etanercept, infliximab, anakinra, rituximab, adalimumab - held before surgery talk to the doctor about these  Gout meds such as allopurinol, colchicine are held on morning of surgery. Medical Conditions needing evaluation and discussion with your doctors and specialists:  Cardiac conditions such as Blood pressure, coronary artery disease, heart failure, or irregular heart beats. Pulmonary conditions such as asthma, COPD, or sleep apnea    Hormonal conditions such as diabetes and hormone replacement see above    Neurologic conditions as noted above Parkinsons, Myasthenia gravis, seizures to name a few need to have special consideration    Rheumatologic/Autoimmune disease need to be specially addressed and any condition that would impair healing such as HIV or history of splenectomy   [facility] is located in [address]. Monday, Tuesday & Friday - 8 a.m. to 4 p.m. Wednesday, Thursday - 7 a.m. to 3 p.m. The lab is closed daily from 12 p.m.-12:30 p.m. Saturday lab hours by appointment. Call [phone] to schedule the appointment.    Please signup for MyCOrthomimeticst, which is electronic access to your record if you have not done so. All your results will post on there. [email or web address]   You can NOW use [email or web address] to book your appointments with us, or consider using open access scheduling which is through the edward website [email or web address] and type in Noe Maddox MD and follow the links for \"Schedule Online Now\"    To schedule Imaging or tests at [facility] Scheduling [phone], Go to [facility] A ER Building (For example: CT scans, X rays, Ultrasound, MRI)  Cardiac Testing in ER building Building A second floor Cardiac Testing [phone] (For example: Holter Monitor, Cardiac Stress tests,Event Monitor, or 2D Echocardiograms)  [facility] call [phone] usually in [place] A  Walk in Clinic in [place] at [facility]. [address] Mon-Fri at 8am-7:30 p.m., and Sat/Sun 9:00a. m.-4:30 p.m. Also at [address]  Call [phone] for info     Please call our office about any questions regarding your treatment/medicines/tests as a result of today's visit. For your safety, read the entire package insert of all medicines prescribed to you and be aware of all of the risks of treatment even beyond those discussed today. All therapies have potential risk of harm or side effects or medication interactions. It is your duty and for your safety to discuss with the pharmacist and our office with questions, and to notify us and stop treatment if problems arise, but know that our intention is that the benefits outweigh those potential risks and we strive to make you healthier and to improve your quality of life. Referrals, and Radiology Information:    If your insurance requires a referral to a specialist, please allow 5 business days to process your referral request.    If Noe Maddox MD orders a CT or MRI, it may take up to 10 business days to receive approval from your insurance company.  Once our office has called informing you that the insurance company approved your testing, please call Central Scheduling at [phone]  Please allow our office 5 business days to contact you regarding any testing results. Refill policies:   Allow 3 business days for refills; controlled substances may take longer and must be picked up from the office in person. Narcotic medications can only be filled in 30 day increments and must be refilled at an office visit only. If your prescription is due for a refill, you may be due for a follow-up appointment. We cannot refill your maintenance medications at a preventative wellness visit. To best provide you care, patients receiving maintenance medications need to be seen at least twice a year.

## 2023-09-10 DIAGNOSIS — E78.5 DYSLIPIDEMIA: ICD-10-CM

## 2023-09-10 RX ORDER — ATORVASTATIN CALCIUM 40 MG/1
40 TABLET, FILM COATED ORAL NIGHTLY
Qty: 90 TABLET | Refills: 0 | Status: SHIPPED | OUTPATIENT
Start: 2023-09-10

## 2023-09-10 RX ORDER — FEBUXOSTAT 40 MG/1
40 TABLET, FILM COATED ORAL DAILY
Qty: 90 TABLET | Refills: 1 | Status: SHIPPED | OUTPATIENT
Start: 2023-09-10

## 2023-09-10 RX ORDER — BUPROPION HYDROCHLORIDE 150 MG/1
150 TABLET ORAL DAILY
Qty: 90 TABLET | Refills: 1 | Status: SHIPPED | OUTPATIENT
Start: 2023-09-10

## 2023-10-11 DIAGNOSIS — E66.01 SEVERE OBESITY WITH BODY MASS INDEX (BMI) OF 35.0 TO 39.9 WITH SERIOUS COMORBIDITY (HCC): ICD-10-CM

## 2023-10-11 DIAGNOSIS — Z51.81 ENCOUNTER FOR THERAPEUTIC DRUG MONITORING: ICD-10-CM

## 2023-10-11 DIAGNOSIS — I10 ESSENTIAL HYPERTENSION: ICD-10-CM

## 2023-10-11 DIAGNOSIS — R73.03 PRE-DIABETES: ICD-10-CM

## 2023-10-11 RX ORDER — NIFEDIPINE 60 MG/1
60 TABLET, FILM COATED, EXTENDED RELEASE ORAL DAILY
Qty: 90 TABLET | Refills: 1 | Status: SHIPPED | OUTPATIENT
Start: 2023-10-11

## 2023-10-11 NOTE — TELEPHONE ENCOUNTER
Requesting   Requested Prescriptions     Pending Prescriptions Disp Refills    JARDIANCE 10 MG Oral Tab [Pharmacy Med Name: Juan Duke 10 MG TABLET] 90 tablet 0     Sig: TAKE 1 TABLET BY MOUTH EVERY DAY     LOV: 4/3/23  RTC: 3 months  Filled: 7/7/23 #90 with 0 refills    Future Appointments   Date Time Provider Alba Skinner   10/16/2023 10:00 AM TEO Martinez EMGWEI [id number]

## 2023-10-16 ENCOUNTER — OFFICE VISIT (OUTPATIENT)
Dept: INTERNAL MEDICINE CLINIC | Facility: CLINIC | Age: 58
End: 2023-10-16
Payer: COMMERCIAL

## 2023-10-16 VITALS
HEIGHT: 70 IN | HEART RATE: 61 BPM | WEIGHT: 202 LBS | SYSTOLIC BLOOD PRESSURE: 122 MMHG | OXYGEN SATURATION: 96 % | DIASTOLIC BLOOD PRESSURE: 64 MMHG | BODY MASS INDEX: 28.92 KG/M2 | RESPIRATION RATE: 18 BRPM

## 2023-10-16 DIAGNOSIS — Z51.81 ENCOUNTER FOR THERAPEUTIC DRUG MONITORING: Primary | ICD-10-CM

## 2023-10-16 DIAGNOSIS — R73.03 PRE-DIABETES: ICD-10-CM

## 2023-10-16 DIAGNOSIS — E66.3 OVERWEIGHT (BMI 25.0-29.9): ICD-10-CM

## 2023-10-16 DIAGNOSIS — E78.2 MIXED HYPERLIPIDEMIA: ICD-10-CM

## 2023-10-16 DIAGNOSIS — I10 ESSENTIAL HYPERTENSION: ICD-10-CM

## 2023-10-16 DIAGNOSIS — Z86.39 HISTORY OF OBESITY: ICD-10-CM

## 2023-10-16 PROCEDURE — 3078F DIAST BP <80 MM HG: CPT | Performed by: NURSE PRACTITIONER

## 2023-10-16 PROCEDURE — 99214 OFFICE O/P EST MOD 30 MIN: CPT | Performed by: NURSE PRACTITIONER

## 2023-10-16 PROCEDURE — 3074F SYST BP LT 130 MM HG: CPT | Performed by: NURSE PRACTITIONER

## 2023-10-16 PROCEDURE — 3008F BODY MASS INDEX DOCD: CPT | Performed by: NURSE PRACTITIONER

## 2023-10-16 RX ORDER — SEMAGLUTIDE 0.68 MG/ML
0.5 INJECTION, SOLUTION SUBCUTANEOUS WEEKLY
Qty: 1 EACH | Refills: 12 | COMMUNITY
Start: 2023-10-16

## 2023-10-16 RX ORDER — TOPIRAMATE 50 MG/1
50 TABLET, FILM COATED ORAL 2 TIMES DAILY
Qty: 180 TABLET | Refills: 1 | Status: SHIPPED | OUTPATIENT
Start: 2023-10-16

## 2023-10-16 RX ORDER — ASPIRIN 81 MG/1
81 TABLET, COATED ORAL DAILY
COMMUNITY
Start: 2023-07-25

## 2023-10-16 RX ORDER — PHENTERMINE HYDROCHLORIDE 37.5 MG/1
37.5 TABLET ORAL EVERY MORNING
Qty: 30 TABLET | Refills: 2 | Status: SHIPPED | OUTPATIENT
Start: 2023-10-16

## 2023-10-17 ENCOUNTER — TELEPHONE (OUTPATIENT)
Dept: INTERNAL MEDICINE CLINIC | Facility: CLINIC | Age: 58
End: 2023-10-17

## 2023-10-17 DIAGNOSIS — I10 ESSENTIAL HYPERTENSION: ICD-10-CM

## 2023-10-17 DIAGNOSIS — Z51.81 ENCOUNTER FOR THERAPEUTIC DRUG MONITORING: ICD-10-CM

## 2023-10-17 DIAGNOSIS — E66.01 SEVERE OBESITY WITH BODY MASS INDEX (BMI) OF 35.0 TO 39.9 WITH SERIOUS COMORBIDITY (HCC): ICD-10-CM

## 2023-10-17 DIAGNOSIS — R73.03 PRE-DIABETES: ICD-10-CM

## 2023-10-17 NOTE — TELEPHONE ENCOUNTER
[facility] in [place] [address]. sent request for Jardiance 10 mg refill  10/11/23 a 90 day was sent to [facility] in [place]  My chart to patient to verify if transfer is needed.

## 2023-10-17 NOTE — TELEPHONE ENCOUNTER
Entered PA in [address] [place] [address] (rajan Connor Chu (Rbiera: Christiane Carl)    Patient has gained weight  Will likely be denied

## 2023-10-18 ENCOUNTER — TELEPHONE (OUTPATIENT)
Dept: INTERNAL MEDICINE CLINIC | Facility: CLINIC | Age: 58
End: 2023-10-18

## 2023-10-18 NOTE — TELEPHONE ENCOUNTER
Request for PA has been denised and needs to be submitted electronically through  [employer].     KEY: [id number]

## 2023-10-20 NOTE — TELEPHONE ENCOUNTER
Is PA pending or denied? Pt was off medication d/t multiple surgeries with weight gain resulting from hold on treatment and now restarting. Please place PA with explanation.  Thanks

## 2023-10-23 NOTE — TELEPHONE ENCOUNTER
I called [employer] to see about re-applying for coverage  Pt ID [id number]  Case ID [id number]    I called [employer] and we can try and resubmit on the same PA that was denied  I clicked renew and will change answers    Patient has been on med and off med - did originally help   Must resubmit with notes.

## 2023-11-22 ENCOUNTER — TELEPHONE (OUTPATIENT)
Dept: INTERNAL MEDICINE CLINIC | Facility: CLINIC | Age: 58
End: 2023-11-22

## 2023-11-22 NOTE — TELEPHONE ENCOUNTER
Patient's wife left a message to call her about a med refill. I called Tegan Reyes and she said that patient need phentermine sent to [facility] in [address]   When verifying with her it was already sent in October for 3 months and confirmed. She is going to call the pharmacy with that information and let me know if there is still a problem. Outpatient Medication Detail     Disp Refills Start End    Phentermine HCl 37.5 MG Oral Tab 30 tablet 2 10/16/2023     Sig - Route:  Take 1 tablet (37.5 mg total) by mouth every morning. - Oral    Sent to pharmacy as: Phentermine HCl 37.5 MG Oral Tablet (ADIPEX-P)    Notes to Pharmacy: Not to exceed 4 additional fills before 03/12/2023    E-Prescribing Status: Receipt confirmed by pharmacy (10/16/2023 10:43 AM CDT)          Pharmacy    [facility]/PHARMACY [address] [facility], [phone], [phone]

## 2023-12-10 DIAGNOSIS — I10 ESSENTIAL HYPERTENSION: ICD-10-CM

## 2023-12-10 DIAGNOSIS — R73.03 PRE-DIABETES: ICD-10-CM

## 2023-12-10 DIAGNOSIS — Z51.81 ENCOUNTER FOR THERAPEUTIC DRUG MONITORING: ICD-10-CM

## 2023-12-10 DIAGNOSIS — E66.01 SEVERE OBESITY WITH BODY MASS INDEX (BMI) OF 35.0 TO 39.9 WITH SERIOUS COMORBIDITY (HCC): ICD-10-CM

## 2023-12-10 DIAGNOSIS — E78.2 MIXED HYPERLIPIDEMIA: ICD-10-CM

## 2023-12-11 DIAGNOSIS — E78.5 DYSLIPIDEMIA: ICD-10-CM

## 2023-12-11 RX ORDER — ATORVASTATIN CALCIUM 40 MG/1
40 TABLET, FILM COATED ORAL NIGHTLY
Qty: 90 TABLET | Refills: 0 | Status: SHIPPED | OUTPATIENT
Start: 2023-12-11

## 2024-03-18 DIAGNOSIS — Z51.81 ENCOUNTER FOR THERAPEUTIC DRUG MONITORING: ICD-10-CM

## 2024-03-18 DIAGNOSIS — Z86.39 HISTORY OF OBESITY: ICD-10-CM

## 2024-03-18 DIAGNOSIS — E66.3 OVERWEIGHT (BMI 25.0-29.9): ICD-10-CM

## 2024-03-19 RX ORDER — SEMAGLUTIDE 1.7 MG/.75ML
1.7 INJECTION, SOLUTION SUBCUTANEOUS WEEKLY
Qty: 3 ML | Refills: 0 | Status: SHIPPED | OUTPATIENT
Start: 2024-03-19 | End: 2024-03-25

## 2024-03-19 RX ORDER — PHENTERMINE HYDROCHLORIDE 37.5 MG/1
37.5 TABLET ORAL EVERY MORNING
Qty: 30 TABLET | Refills: 0 | Status: SHIPPED | OUTPATIENT
Start: 2024-03-19 | End: 2024-03-25

## 2024-03-19 NOTE — TELEPHONE ENCOUNTER
Please over book him on a Monday in [place] at 3:40pm or Thursday in [place] at 4:40pm, only if not other overbooks. Refilled medication for 1 month only. Thanks

## 2024-03-19 NOTE — TELEPHONE ENCOUNTER
Requesting   Requested Prescriptions     Pending Prescriptions Disp Refills    PHENTERMINE HCL 37.5 MG Oral Tab [Pharmacy Med Name: PHENTERMINE 37.5 MG TABLET] 30 tablet 2     Sig: TAKE 1 TABLET BY MOUTH EVERY MORNING.    WEGOVY 1.7 MG/0.75ML Subcutaneous Solution Auto-injector [Pharmacy Med Name: WEGOVY 1.7 MG/0.75 ML PEN]  1     Sig: INJECT 0.75 ML (1.7 MG TOTAL) INTO THE SKIN ONCE A WEEK. *PA DENIED     LOV: 10/16/23  RTC: 33 months  Filled: phen 1016/23 #30 with 2 refills  Wegovy 10/16/23 #3 with 1 refill    Future Appointments   Date Time Provider Department Center   4/29/2024 10:00 AM Aleks Cárdenas MD EM[facility] [address] [facility] [address]   7/1/2024  8:00 AM Mari Romero APRN EMGWEI [id number]

## 2024-03-20 NOTE — TELEPHONE ENCOUNTER
Future Appointments   Date Time Provider Department Center   3/25/2024  1:20 PM Mari Romero APRN EMG[facility]I [id number]   4/29/2024 10:00 AM Aleks Cárdenas MD EMG [address] [facility] [address]   7/1/2024  8:00 AM Mari Romero APRN [facility] [id number]

## 2024-03-25 ENCOUNTER — OFFICE VISIT (OUTPATIENT)
Dept: INTERNAL MEDICINE CLINIC | Facility: CLINIC | Age: 59
End: 2024-03-25
Payer: COMMERCIAL

## 2024-03-25 VITALS
OXYGEN SATURATION: 96 % | HEART RATE: 66 BPM | SYSTOLIC BLOOD PRESSURE: 140 MMHG | BODY MASS INDEX: 28.92 KG/M2 | RESPIRATION RATE: 16 BRPM | WEIGHT: 202 LBS | DIASTOLIC BLOOD PRESSURE: 80 MMHG | HEIGHT: 70 IN

## 2024-03-25 DIAGNOSIS — I10 ESSENTIAL HYPERTENSION: ICD-10-CM

## 2024-03-25 DIAGNOSIS — E66.3 OVERWEIGHT (BMI 25.0-29.9): ICD-10-CM

## 2024-03-25 DIAGNOSIS — Z51.81 ENCOUNTER FOR THERAPEUTIC DRUG MONITORING: Primary | ICD-10-CM

## 2024-03-25 DIAGNOSIS — E78.2 MIXED HYPERLIPIDEMIA: ICD-10-CM

## 2024-03-25 DIAGNOSIS — R73.03 PRE-DIABETES: ICD-10-CM

## 2024-03-25 DIAGNOSIS — Z86.39 HISTORY OF OBESITY: ICD-10-CM

## 2024-03-25 PROCEDURE — 99213 OFFICE O/P EST LOW 20 MIN: CPT | Performed by: NURSE PRACTITIONER

## 2024-03-25 PROCEDURE — 3008F BODY MASS INDEX DOCD: CPT | Performed by: NURSE PRACTITIONER

## 2024-03-25 PROCEDURE — 3079F DIAST BP 80-89 MM HG: CPT | Performed by: NURSE PRACTITIONER

## 2024-03-25 PROCEDURE — 3077F SYST BP >= 140 MM HG: CPT | Performed by: NURSE PRACTITIONER

## 2024-03-25 RX ORDER — TOPIRAMATE 50 MG/1
50 TABLET, FILM COATED ORAL 2 TIMES DAILY
Qty: 180 TABLET | Refills: 1 | Status: SHIPPED | OUTPATIENT
Start: 2024-03-25

## 2024-03-25 RX ORDER — PHENTERMINE HYDROCHLORIDE 37.5 MG/1
37.5 TABLET ORAL EVERY MORNING
Qty: 30 TABLET | Refills: 3 | Status: SHIPPED | OUTPATIENT
Start: 2024-03-25

## 2024-03-25 RX ORDER — SEMAGLUTIDE 2.4 MG/.75ML
2.4 INJECTION, SOLUTION SUBCUTANEOUS WEEKLY
Qty: 9 ML | Refills: 1 | Status: SHIPPED | OUTPATIENT
Start: 2024-03-25

## 2024-03-25 NOTE — PROGRESS NOTES
You Guan . is a 58 year old male presents today for follow-up on medical weight loss program for the treatment of overweight, obesity, or morbid obesity with HTN, prediabetes, hyperlipidemia, JAVID.    S:  Current weight   Wt Readings from Last 6 Encounters:   03/25/24 202 lb (91.6 kg)   10/16/23 202 lb (91.6 kg)   08/21/23 199 lb (90.3 kg)   04/25/23 196 lb (88.9 kg)   04/03/23 190 lb (86.2 kg)   01/11/23 191 lb (86.6 kg)    AND BMI Body mass index is 28.98 kg/m²..    Patient has lost 0# since LOV in 10/2023. He has been consistent with medication. C/o constipation with BM every 2-3x/week. No OTC tx. Would like to go up on Wegovy to previous 2.4 mg weekly dose.    [facility] Follow Up      Question 3/25/2024 12:24 PM CDT - Filed by Patient   Please describe a success moment: When I got on the scale and I was under 200lbs.   Please describe a challenging moment/needs for improvement: When my insurance requested information from your office to continue to cover my medications and my wife was told you don't do that!   Please complete this 24 hour food journal, listing everything you had to eat in the past day. Include the average time of day you ate these meals at    List foods, qty and prep for breakfast:    List foods, qty and prep for lunch.    List foods, qty and prep for dinner.    List foods, qty and prep for snacks.    List the types and qty of fluids consumed    On average, how many meals did you eat out per week?    Exercise    How many days per week are you active or exercise    On average, how many days were anaerobic (strength/resistance) exercises performed?    On average, how many days were aerobic (cardio) exercises performed?    Perceived level of exertion on a scale of 1-5, with 5 being very intense:    Stress    Average stress level on a scale of 1-10, with 10 being extremely stressed:    If greater than 5/1O how would you grade your coping mechanisms?    Sleep hours and  integrity    How many hours of uninterrupted sleep do you get a night:    Do you feel rested in the morning:    If no, what may have been disrupting your sleep?    Please list any goal(s) for your next visit        Social hx reviewed. Employed as a  with commute 45min-1 hour, out of the house by 5am. . Good spousal support.    PMH reviewed. Cardiac disorders: HTN, Hypothyroid: no, Mental illness: no, Glaucoma: no, Kidney stones: yes x1 Eating disorder: no, JAVDI: yes-Bipap, Migraines: no, Seizures: no, Liver disease: no, Renal disease: no, Diabetes: prediabetes, Gastroparesis: no, Constipation: no, Joint pains: no, Cancer hx: no, FMH thyroid or pancreatic cancer: no. ED.    REVIEW OF SYSTEMS:  GENERAL: feels well otherwise  LUNGS: denies shortness of breath with exertion  CARDIOVASCULAR: denies chest pain on exertion, denies palpitations or pedal edema  GI: denies abdominal pain.  No N/V/D/C  MUSCULOSKELETAL: denies acute joint or muscle pain.  NEURO: denies headaches or dizziness  PSYCH: denies change in behavior or mood, grieving reported    EXAM:  /80   Pulse 66   Resp 16   Ht 5' 10\" (1.778 m)   Wt 202 lb (91.6 kg)   SpO2 96%   BMI 28.98 kg/m²    GENERAL: well developed, well nourished, in no apparent distress, overweight  EYES: conjunctiva pink, sclera non icteric  LUNGS: CTA, breathing non labored  CARDIO: RRR without murmur, normal S1 and S2 without clicks or gallops, no pedal edema.  GI: +BS  NEURO/MS: motor and sensory grossly intact  PSYCH: pleasant, cooperative, normal mood    ASSESSMENT AND PLAN:  Encounter Diagnoses   Name Primary?    Encounter for therapeutic drug monitoring Yes    Overweight (BMI 25.0-29.9)     History of obesity     Essential hypertension     Mixed hyperlipidemia     Pre-diabetes        Orders Placed This Encounter   Procedures    Hemoglobin A1C       Meds & Refills for this Visit:  Requested Prescriptions     Signed Prescriptions Disp Refills    topiramate 50  MG Oral Tab 180 tablet 1     Sig: Take 1 tablet (50 mg total) by mouth 2 (two) times daily.    Phentermine HCl 37.5 MG Oral Tab 30 tablet 3     Sig: Take 1 tablet (37.5 mg total) by mouth every morning.    empagliflozin (JARDIANCE) 10 MG Oral Tab 90 tablet 1     Sig: Take 1 tablet (10 mg total) by mouth daily.    semaglutide-weight management (WEGOVY) 2.4 MG/0.75ML Subcutaneous Solution Auto-injector 9 mL 1     Sig: Inject 0.75 mL (2.4 mg total) into the skin once a week.       Imaging & Consults:  None      Plan:  Patient has lost 0# since LOV in 10/2023 on Wegovy 1.7 mg weekly, phentermine 37.5 mg daily, Topamax 50 mg BID, Jardiance 10 mg daily with a total weight loss of 88# since initial consult on 8/19/2014 with initial weight of 292#. Weight loss goal: weigh under 200#. CPM, Hx of Metformin ER. Mood swings on Wellbutrin so no Contrave.  on constipation prevention and OTC options. Advise home BP monitoring- elevated today. Pt reports he just took his BP medication. See patient instructions below for additional plans and patient counseling.      Patient Instructions   Continue making lifestyle changes that focus on good nutrition, regular exercise and stress management.    Medication Plan: Continue current medication regimen aside from increase Wegovy to 2.4 mg weekly. Can add over the counter colace or miralax daily for constipation.    Agreed upon goal/s before next office visit include:  Monitor home blood pressure and follow up with PCP as planned for annual wellness. I add HgbA1c lab test.    What is high blood pressure?  High blood pressure, (also referred to as hypertension), is when your blood pressure, the force of blood flowing through your blood vessels, is consistently too high. Learn more about high blood pressure at https://www.heart.org/en/health-topics/high-blood-pressure.    If you have high blood pressure, you are not alone.  Nearly half of American adults have high blood pressure. (Many  don’t even know they have it.)  The best way to know if you have high blood pressure it is to have your blood pressure checked.    Know your numbers.  Learn about your blood pressure numbers and what they mean.    BLOOD PRESSURE (BP) CATEGORY SYSTOLIC mm Hg (upper number) And/or DIASTOLIC mm Hg (lower number)   Normal Less than 120 and Less than 80   Elevated 120-129 and Less than 80   High BP: HTN Stage 1 130-139 or 80-89   High BP: HTN Stage 2 140 or higher or 90 or higher   Hypertensive Crisis Higher than 180 And/or Higher than 120     High blood pressure is a 'silent killer.'  Most of the time there are no obvious symptoms.  Certain physical traits and lifestyle choices can put you at a greater risk for high blood pressure.  When left untreated, the damage that high blood pressure does to your circulatory system is a significant contributing factor to heart attack, stroke and other health threats.    Constipation  Constipation is a common and often uncomfortable problem. Constipation means you have bowel movements fewer than 3 times per week, or strain to pass hard, dry stool. It can last a short time. Or it can be a problem that never seems to go away. The good news is that it can often be treated and controlled.  Eat more fiber  One of the best ways to help treat constipation is to increase your fiber intake. You can do this either through diet or by using fiber supplements. Fiber (in whole grains, fruits, and vegetables) adds bulk and absorbs water to soften the stool. This helps the stool pass through the colon more easily. When you increase your fiber intake, do it slowly to avoid side effects such as bloating. Also increase the amount of water that you drink. Eating more of the following foods can add fiber to your diet.  High-fiber cereals  Whole grains, bran, and brown rice  Vegetables such as carrots, broccoli, and greens  Fresh fruits (especially apples, pears, and dried fruits like raisins and  apricots)  Nuts and legumes (especially beans such as lentils, kidney beans, and lima beans)  Get physically active  Exercise helps improve the working of your colon which helps ease constipation. Try to get some physical activity every day. If you haven’t been active for a while, talk to your healthcare provider before starting again.    © 8395-4736 [employer]. [address] [place] [address], [place] [address]. All rights reserved. This information is not intended as a substitute for professional medical care. Always follow your healthcare professional's instructions.        Medication use and SEs reviewed with patient.    Return in about 4 months (around 7/25/2024) for weight management via clinic.    Patient verbalizes understanding.

## 2024-03-25 NOTE — PATIENT INSTRUCTIONS
Continue making lifestyle changes that focus on good nutrition, regular exercise and stress management.    Medication Plan: Continue current medication regimen aside from increase Wegovy to 2.4 mg weekly. Can add over the counter colace or miralax daily for constipation.    Agreed upon goal/s before next office visit include:  Monitor home blood pressure and follow up with PCP as planned for annual wellness. I add HgbA1c lab test.    What is high blood pressure?  High blood pressure, (also referred to as hypertension), is when your blood pressure, the force of blood flowing through your blood vessels, is consistently too high. Learn more about high blood pressure at https://www.heart.org/en/health-topics/high-blood-pressure.    If you have high blood pressure, you are not alone.  Nearly half of American adults have high blood pressure. (Many don’t even know they have it.)  The best way to know if you have high blood pressure it is to have your blood pressure checked.    Know your numbers.  Learn about your blood pressure numbers and what they mean.    BLOOD PRESSURE (BP) CATEGORY SYSTOLIC mm Hg (upper number) And/or DIASTOLIC mm Hg (lower number)   Normal Less than 120 and Less than 80   Elevated 120-129 and Less than 80   High BP: HTN Stage 1 130-139 or 80-89   High BP: HTN Stage 2 140 or higher or 90 or higher   Hypertensive Crisis Higher than 180 And/or Higher than 120     High blood pressure is a 'silent killer.'  Most of the time there are no obvious symptoms.  Certain physical traits and lifestyle choices can put you at a greater risk for high blood pressure.  When left untreated, the damage that high blood pressure does to your circulatory system is a significant contributing factor to heart attack, stroke and other health threats.    Constipation  Constipation is a common and often uncomfortable problem. Constipation means you have bowel movements fewer than 3 times per week, or strain to pass hard, dry  stool. It can last a short time. Or it can be a problem that never seems to go away. The good news is that it can often be treated and controlled.  Eat more fiber  One of the best ways to help treat constipation is to increase your fiber intake. You can do this either through diet or by using fiber supplements. Fiber (in whole grains, fruits, and vegetables) adds bulk and absorbs water to soften the stool. This helps the stool pass through the colon more easily. When you increase your fiber intake, do it slowly to avoid side effects such as bloating. Also increase the amount of water that you drink. Eating more of the following foods can add fiber to your diet.  High-fiber cereals  Whole grains, bran, and brown rice  Vegetables such as carrots, broccoli, and greens  Fresh fruits (especially apples, pears, and dried fruits like raisins and apricots)  Nuts and legumes (especially beans such as lentils, kidney beans, and lima beans)  Get physically active  Exercise helps improve the working of your colon which helps ease constipation. Try to get some physical activity every day. If you haven’t been active for a while, talk to your healthcare provider before starting again.    © 6313-3322 [employer]. [address] [place] [address], [place] [address]. All rights reserved. This information is not intended as a substitute for professional medical care. Always follow your healthcare professional's instructions.

## 2024-05-06 ENCOUNTER — OFFICE VISIT (OUTPATIENT)
Dept: FAMILY MEDICINE CLINIC | Facility: CLINIC | Age: 59
End: 2024-05-06
Payer: COMMERCIAL

## 2024-05-06 VITALS
DIASTOLIC BLOOD PRESSURE: 70 MMHG | HEIGHT: 70 IN | HEART RATE: 60 BPM | SYSTOLIC BLOOD PRESSURE: 126 MMHG | OXYGEN SATURATION: 98 % | RESPIRATION RATE: 16 BRPM | TEMPERATURE: 98 F | WEIGHT: 198 LBS | BODY MASS INDEX: 28.35 KG/M2

## 2024-05-06 DIAGNOSIS — Z12.5 SCREENING FOR MALIGNANT NEOPLASM OF PROSTATE: ICD-10-CM

## 2024-05-06 DIAGNOSIS — E78.5 DYSLIPIDEMIA: ICD-10-CM

## 2024-05-06 DIAGNOSIS — Z00.00 WELLNESS EXAMINATION: Primary | ICD-10-CM

## 2024-05-06 PROCEDURE — 3074F SYST BP LT 130 MM HG: CPT | Performed by: FAMILY MEDICINE

## 2024-05-06 PROCEDURE — 3078F DIAST BP <80 MM HG: CPT | Performed by: FAMILY MEDICINE

## 2024-05-06 PROCEDURE — 99396 PREV VISIT EST AGE 40-64: CPT | Performed by: FAMILY MEDICINE

## 2024-05-06 PROCEDURE — 3008F BODY MASS INDEX DOCD: CPT | Performed by: FAMILY MEDICINE

## 2024-05-06 RX ORDER — ATORVASTATIN CALCIUM 40 MG/1
40 TABLET, FILM COATED ORAL NIGHTLY
Qty: 90 TABLET | Refills: 3 | Status: SHIPPED | OUTPATIENT
Start: 2024-05-06

## 2024-05-06 RX ORDER — FLUOXETINE HYDROCHLORIDE 40 MG/1
40 CAPSULE ORAL DAILY
Qty: 90 CAPSULE | Refills: 3 | Status: SHIPPED | OUTPATIENT
Start: 2024-05-06

## 2024-05-06 RX ORDER — FEBUXOSTAT 40 MG/1
40 TABLET, FILM COATED ORAL DAILY
Qty: 90 TABLET | Refills: 3 | Status: SHIPPED | OUTPATIENT
Start: 2024-05-06

## 2024-05-06 NOTE — PROGRESS NOTES
Wellness Exam    REASON FOR VISIT:    You Guan Jr. is a 58 year old male who presents for an Annual Health Assessment.    Current Complaints: Mr. Guan is here for his wellness exam  Flu Shot: see immunization record  Health Maintenance Topics with due status: Overdue       Topic Date Due    Tobacco Cessation Counseling 1 Year Never done    Zoster Vaccines Never done    Pneumococcal Vaccine: Birth to 64yrs 06/19/2015    COVID-19 Vaccine 09/01/2023    Annual Depression Screening 01/01/2024    HTN: BP Follow-Up 04/25/2024    Annual Physical 04/25/2024     Reported Health:   Mr. Guan is a pleasant 58-year-old gentleman with history of hypertension, hyperlipidemia, asthma, bladder cancer, depression, anxiety, tobacco dependence, h/o alcohol dependence here for his wellness exam.  He has been doing generally well.  He continues to see urology for treatment of bladder cancer as he has been getting BCG injections.  He recently underwent surgery for this with no notable complications.  He will need refills for some of his medications. I had reviewed past medical and family histories together with allergy and medication lists documented.    Details about the complaints:  N/A    General Health                                         CAGE:                               PHQ-4: Over the LAST 2 WEEKS       Depression Screening (PHQ-2/PHQ-9): Over the LAST 2 WEEKS                            PREVENTATIVE SERVICES  INDICATIONS AND SCHEDULE Recommendation Internal Lab or Procedure External Lab or Procedure   Colonoscopy Screen Every 10 years Health Maintenance   Topic Date Due    Colorectal Cancer Screening  03/15/2025       Flex Sigmoidoscopy Screen  Every 5 years No results found for this or any previous visit.    Fecal Occult Blood  Annually Occult Blood Result (no units)   Date Value   05/31/2018 Negative for Occult Blood       Obesity Screening Screen all adults annually There is no height or weight on file to  calculate BMI.      Preventive Services for Which Recommendations Vary with Risk Recommendation Internal Lab or Procedure External Lab or Procedure   Cholesterol Screening Recommended screening varies with age, risk and gender LDL Cholesterol (mg/dL)   Date Value   06/05/2023 173 (H)     LDL-CHOLESTEROL (mg/dL)   Date Value   04/12/2014 91     LDL CHOLESTROL (mg/dL)   Date Value   07/12/2014 84       Diabetes Screening  If history of high blood pressure or other  risk factors HEMOGLOBIN A1c (%)   Date Value   04/12/2014 6.1 (H)     HgbA1C (%)   Date Value   06/05/2023 5.7 (H)     Glucose (mg/dL)   Date Value   06/05/2023 94     GLUCOSE (mg/dL)   Date Value   04/07/2014 107 (H)         Gonorrhea Screening If high risk No results found for: \"GONOCOCCUS\"    HIV Screening For all adults age 18-65, older adults at increased risk No results found for: \"HIV\"    Syphilis Screening Screen if pregnant or high risk No results found for: \"RPR\"    Hepatitis C Screening Screen those at high risk plus screen one time for adults born 1945-1 965 No results found for: \"HCVAB\"    Tuberculosis Screen If high risk No components found for: \"PPDINDURAT\"      ALLERGIES:     Allergies   Allergen Reactions    Meperidine NAUSEA AND VOMITING and OTHER (SEE COMMENTS)    Penicillins UNKNOWN     As infant.        CURRENT MEDICATIONS:   Current Outpatient Medications   Medication Sig Dispense Refill    NIFEdipine ER 60 MG Oral Tablet 24 Hr Take 1 tablet (60 mg total) by mouth daily. 90 tablet 1    topiramate 50 MG Oral Tab Take 1 tablet (50 mg total) by mouth 2 (two) times daily. 180 tablet 1    Phentermine HCl 37.5 MG Oral Tab Take 1 tablet (37.5 mg total) by mouth every morning. 30 tablet 3    empagliflozin (JARDIANCE) 10 MG Oral Tab Take 1 tablet (10 mg total) by mouth daily. 90 tablet 1    semaglutide-weight management (WEGOVY) 2.4 MG/0.75ML Subcutaneous Solution Auto-injector Inject 0.75 mL (2.4 mg total) into the skin once a week. 9 mL 1     atorvastatin 40 MG Oral Tab Take 1 tablet (40 mg total) by mouth nightly. 90 tablet 0    ASPIRIN LOW DOSE 81 MG Oral Tab EC Take 1 tablet (81 mg total) by mouth daily.      febuxostat 40 MG Oral Tab Take 1 tablet (40 mg total) by mouth daily. 90 tablet 1    FLUOXETINE HCL 40 MG Oral Cap TAKE 1 CAPSULE BY MOUTH  DAILY 90 capsule 3    BD LUER-COURT SYRINGE 23G X 1\" 3 ML Does not apply Misc USE 1 SYRINGE TO INJECT TESTOSTERONE WEEKLY      Testosterone cypionate 200 MG/ML Intramuscular Solution Inject 0.25 mL (50 mg total) into the muscle once a week.      BD LUER-COURT SYRINGE 18G X 1-1/2\" 3 ML Does not apply Misc USE 1 NEEDLE TO DRAW UP TESTOSTERONE WEEKLY      Multiple Vitamin (THERA/BETA-CAROTENE) Oral Tab take 1 tablet by oral route  every day with food      Thiamine HCl 100 MG Oral Tab Take 1 tablet (100 mg total) by mouth daily. 30 tablet 0    Cholecalciferol (VITAMIN D) 2000 units Oral Tab Take 2,000 Units by mouth daily.        MEDICAL INFORMATION:   Past Medical History:    Abdominal distention    Abdominal hernia    Abdominal pain    Allergic rhinitis    Anxiety    Arthritis    Aspergillosis, with pneumonia (HCC)    RUL cavitary lesion 1.2015 sp voriconazole ID fu     Asthma (HCC)    Back pain    Black stools    Bladder cancer (HCC)    Bloating    Body piercing    Calculus of kidney    Cancer (HCC)    Cavitary lesion of lung    Chest pain    Chronic cough    Colitis    Constipation    Decorative tattoo    Diabetes mellitus (HCC)    Diarrhea, unspecified    Erythrocytosis    Esophageal reflux    Extrinsic asthma, unspecified    Fatigue    Food intolerance    Gout    Hemorrhoids    High blood pressure    High cholesterol    Histoplasmosis    Hyperproteinemia    Indigestion    Irregular bowel habits    Kidney stone    Sp lithotripsy may July 2013    Mycobacterial infection    2015    Nausea    Other and unspecified hyperlipidemia    Pain in joints    Pain with bowel movements    Pre-diabetes    Prediabetes     Reactive arthritis (HCC)    Resolved 7.2015    Renal disorder    Sleep disturbance    Stress    Trigger finger    Ulcerative colitis (HCC)    Uncomfortable fullness after meals    Unspecified essential hypertension    Unspecified sleep apnea    Visual impairment    Wears glasses    Weight loss      Past Surgical History:   Procedure Laterality Date    Colonoscopy  2014    Colonoscopy      Fusion of ankle joint      Hernia surgery      Lithotripsy      Umbilical hernia repair        Family History   Problem Relation Age of Onset    Diabetes Father     Colon Cancer Father     Other (mva) Father         many joint replacements from mva    Colon Polyps Mother     Colon Cancer Mother     Hypertension Mother     Other (htn) Mother     Other (uc) Mother     Colon Cancer Paternal Grandfather     Ovarian Cancer Maternal Grandmother     Alcohol and Other Disorders Associated Maternal Grandfather     Alcohol and Other Disorders Associated Maternal Aunt     Alcohol and Other Disorders Associated Paternal Aunt     Alcohol and Other Disorders Associated Maternal Uncle     Alcohol and Other Disorders Associated Paternal Uncle       SOCIAL HISTORY:   Social History     Socioeconomic History    Marital status:    Tobacco Use    Smoking status: Every Day     Current packs/day: 0.50     Types: Cigarettes    Smokeless tobacco: Never   Substance and Sexual Activity    Alcohol use: Yes     Alcohol/week: 5.0 standard drinks of alcohol     Types: 5 Cans of beer per week    Drug use: No          REVIEW OF SYSTEMS:   Constitutional: Negative for fever, chills and fatigue.   HENT: Negative for hearing loss, congestion, sore throat and neck pain.    Eyes: Negative for pain and visual disturbance.   Respiratory: Negative for cough and shortness of breath.    Cardiovascular: Negative for chest pain and palpitations.   Gastrointestinal: Negative for nausea, vomiting, abdominal pain and diarrhea.   Genitourinary: Negative for urgency,  frequency and difficulty urinating.   Musculoskeletal: Negative for arthralgias and no gait problem.   Skin: Negative for color change and rash.   Neurological: Negative for tremors, weakness and numbness.   Hematological: Negative for adenopathy. Does not bruise/bleed easily.   Psychiatric/Behavioral: Negative for confusion and agitation. The patient is not nervous/anxious.      EXAM:   There were no vitals taken for this visit.  Constitutional: He appears well-developed, nourished, and his stated age. Vital signs reviewed  Pleasant man   Head: Normocephalic and atraumatic.   Ear: TMs visible and normal bilaterally  Nose: Nose normal.   Eyes: EOM are normal. Pupils are equal, round, and reactive to light. No scleral icterus.   Neck: Normal range of motion. No thyromegaly present.   Cardiovascular: Normal rate, regular rhythm and normal heart sounds.  Exam reveals no friction rub.    No murmur heard.  Pulmonary/Chest: Effort normal and breath sounds normal. He has no wheezes. He has no rales.   Abdominal: Soft. Bowel sounds are normal. There is no tenderness.   Musculoskeletal: Normal range of motion. He exhibits no edema.   Lymphadenopathy:     He has no cervical adenopathy.   Neurological: He is alert and oriented to person, place, and time. He has normal reflexes.   Skin: Skin is warm. No rash noted. No erythema. with normal hair  Psychiatric: He has a normal mood and affect. His behavior is normal.     ASSESSMENT AND OTHER RELEVANT CHRONIC CONDITIONS:   You Guan Jr. is a 58 year old male who presents for an Annual Health Assessment.   1. Wellness examination        PLAN SUMMARY:   You Guan Jr. is a 58 year old male  Age appropriate cancer screening, labs, safety, immunizations were discussed with the patient and ordered as follows:  Diagnoses and all orders for this visit:    Wellness examination    We will notify him once we get this results I will provide him recommendations  thereafter.  Continue with current medication regimen.  Follow-up in 1 year or as needed      This note was prepared using Dragon Medical voice recognition dictation software. As a result errors may occur. When identified these errors have been corrected. While every attempt is made to correct errors during dictation discrepancies may still exist            No orders of the defined types were placed in this encounter.      Imaging & Consults:  None    His 5 year prevention plan includes: annual visits for fasting labs  Health Maintenance   Topic Date Due    Tobacco Cessation Counseling 1 Year  Never done    Zoster Vaccines (1 of 2) Never done    Pneumococcal Vaccine: Birth to 64yrs (2 of 2 - PCV) 06/19/2015    COVID-19 Vaccine (4 - 2023-24 season) 09/01/2023    Annual Depression Screening  01/01/2024    HTN: BP Follow-Up  04/25/2024    Annual Physical  04/25/2024    DTaP,Tdap,and Td Vaccines (2 - Td or Tdap) 06/19/2024    Asthma Action Plan  08/21/2024    Asthma Control Test  08/21/2024    Influenza Vaccine (Season Ended) 10/01/2024    Colorectal Cancer Screening  03/15/2025    PSA  06/05/2025     Patient/Caregiver Education:  Patient/Caregiver Education: There are no barriers to learning. Medical education done.  Outcome: Patient verbalizes understanding.    Educated by: MD   The patient indicates understanding of these issues and agrees to the plan.    SUGGESTED VACCINATIONS - Influenza, Pneumococcal, Zoster, Tetanus     Immunization History   Administered Date(s) Administered    Covid-19 Vaccine Pfizer 30 mcg/0.3 ml 04/25/2021, 05/20/2021, 02/03/2022    Pneumovax 23 06/19/2014    TDAP 06/19/2014       Influenza Annually   Pneumococcal if high risk   Td/Tdap once then every 10 years   HPV Males 11-21   Zoster (Shingles) 60 and older: one dose   Varicella 2 doses if not immune   MMR 1-2 doses if born after 1956 and not immune     Patient Active Problem List   Diagnosis    Ulcerative colitis (HCC)    HTN  (hypertension)    Allergic rhinitis    Pre-diabetes    Asthma (HCC)    Kidney stone    Colon polyp    Hypogonadism male    Statin myopathy    Proteinuria    Obesity (BMI 30-39.9)    ED (erectile dysfunction)    Low libido    High risk medication use    Encounter for therapeutic drug monitoring    Polyarticular arthritis    Numbness in feet    Drug induced constipation    Hematospermia    Gout    Polycythemia    Depression, major, recurrent, mild (HCC)    Dyslipidemia    Diarrhea    Diarrhea, unspecified type    Alcohol dependence with withdrawal (HCC)    Mixed hyperlipidemia    Abnormal LFTs    Anxiety disorder    Raynaud's disease without gangrene    Trigger index finger of left hand    History of adenomatous polyp of colon    diverticulosis    Family history of colon cancer    Colon polyps    Tobacco dependence    Overweight (BMI 25.0-29.9)    History of obesity    Essential hypertension     PREVENTIVE VISIT,EST,40-64

## 2024-05-06 NOTE — PATIENT INSTRUCTIONS
Thank you for choosing Aleks Cárdenas MD at [facility]  To Do: You Guan Jr.  1. Please see age appropriate health prevention below     Call [phone] to schedule the appointment.   Please signup for Quantuvis, which is electronic access to your record if you have not done so.  All your results will post on there.  [email or web address]   You can NOW use Quantuvis to book your appointments with us, or consider using open access scheduling which is through the [place] website [email or web address] and type in Aleks Cárdenas MD and follow the links for \"Schedule Online Now\"    To schedule Imaging or tests at [facility] call Central Scheduling [phone], Go to [facility] ER Building (For example: CT scans, X rays, Ultrasound, MRI)  Cardiac Testing in ER building Building A second floor Cardiac Testing [phone] (For example: Holter Monitor, Cardiac Stress tests,Event Monitor, or 2D Echocardiograms)  [facility] call [phone] usually in [facility]  Walk in Clinic in [place] at [address] Mon-Fri at 8am-7:30 p.m., and Sat/Sun 9:00a.m.-4:30 p.m.  Also at [address] [place] [address]  Call [phone] for info     Please call our office about any questions regarding your treatment/medicines/tests as a result of today's visit.  For your safety, read the entire package insert of all medicines prescribed to you and be aware of all of the risks of treatment even beyond those discussed today.  All therapies have potential risk of harm or side effects or medication interactions.  It is your duty and for your safety to discuss with the pharmacist and our office with questions, and to notify us and stop treatment if problems arise, but know that our intention is that the benefits outweigh those potential risks and we strive to make you healthier and to improve your quality of life.    Referrals, and Radiology Information:    If your insurance requires a referral to a specialist,  please allow 5 business days to process your referral request.    If Aleks Cárdenas MD orders a CT or MRI, it may take up to 10 business days to receive approval from your insurance company. Once our office has called informing you that the insurance company approved your testing, please call Central Scheduling at [phone]  Please allow our office 5 business days to contact you regarding any testing results.    Refill policies:   Allow 3 business days for refills; controlled substances may take longer and must be picked up from the office in person.  Narcotic medications can only be filled in 30 day increments and must be refilled at an office visit only.  If your prescription is due for a refill, you may be due for a follow-up appointment.  We cannot refill your maintenance medications at a preventative wellness visit.  To best provide you care, patients receiving maintenance medications need to be seen at least twice a year.

## 2024-05-11 DIAGNOSIS — E78.2 MIXED HYPERLIPIDEMIA: ICD-10-CM

## 2024-05-11 DIAGNOSIS — Z51.81 ENCOUNTER FOR THERAPEUTIC DRUG MONITORING: ICD-10-CM

## 2024-05-11 DIAGNOSIS — I10 ESSENTIAL HYPERTENSION: ICD-10-CM

## 2024-05-11 DIAGNOSIS — E66.01 SEVERE OBESITY WITH BODY MASS INDEX (BMI) OF 35.0 TO 39.9 WITH SERIOUS COMORBIDITY (HCC): ICD-10-CM

## 2024-05-11 DIAGNOSIS — R73.03 PRE-DIABETES: ICD-10-CM

## 2024-06-03 ENCOUNTER — LAB ENCOUNTER (OUTPATIENT)
Dept: LAB | Age: 59
End: 2024-06-03
Attending: FAMILY MEDICINE
Payer: COMMERCIAL

## 2024-06-03 DIAGNOSIS — E66.3 OVERWEIGHT (BMI 25.0-29.9): ICD-10-CM

## 2024-06-03 DIAGNOSIS — Z86.39 HISTORY OF OBESITY: ICD-10-CM

## 2024-06-03 DIAGNOSIS — R73.03 PRE-DIABETES: ICD-10-CM

## 2024-06-03 DIAGNOSIS — Z12.5 SCREENING FOR MALIGNANT NEOPLASM OF PROSTATE: ICD-10-CM

## 2024-06-03 DIAGNOSIS — E78.2 MIXED HYPERLIPIDEMIA: ICD-10-CM

## 2024-06-03 DIAGNOSIS — I10 ESSENTIAL HYPERTENSION: ICD-10-CM

## 2024-06-03 DIAGNOSIS — Z51.81 ENCOUNTER FOR THERAPEUTIC DRUG MONITORING: ICD-10-CM

## 2024-06-03 DIAGNOSIS — Z00.00 WELLNESS EXAMINATION: ICD-10-CM

## 2024-06-03 LAB
ALBUMIN SERPL-MCNC: 3.7 G/DL (ref 3.4–5)
ALBUMIN/GLOB SERPL: 1 {RATIO} (ref 1–2)
ALP LIVER SERPL-CCNC: 54 U/L
ALT SERPL-CCNC: 17 U/L
ANION GAP SERPL CALC-SCNC: 6 MMOL/L (ref 0–18)
AST SERPL-CCNC: 17 U/L (ref 15–37)
BASOPHILS # BLD AUTO: 0.07 X10(3) UL (ref 0–0.2)
BASOPHILS NFR BLD AUTO: 0.9 %
BILIRUB SERPL-MCNC: 0.4 MG/DL (ref 0.1–2)
BUN BLD-MCNC: 11 MG/DL (ref 9–23)
CALCIUM BLD-MCNC: 9.1 MG/DL (ref 8.5–10.1)
CHLORIDE SERPL-SCNC: 111 MMOL/L (ref 98–112)
CHOLEST SERPL-MCNC: 212 MG/DL (ref ?–200)
CO2 SERPL-SCNC: 24 MMOL/L (ref 21–32)
COMPLEXED PSA SERPL-MCNC: 1.91 NG/ML (ref ?–4)
CREAT BLD-MCNC: 1.32 MG/DL
EGFRCR SERPLBLD CKD-EPI 2021: 63 ML/MIN/1.73M2 (ref 60–?)
EOSINOPHIL # BLD AUTO: 0.29 X10(3) UL (ref 0–0.7)
EOSINOPHIL NFR BLD AUTO: 3.8 %
ERYTHROCYTE [DISTWIDTH] IN BLOOD BY AUTOMATED COUNT: 15.7 %
EST. AVERAGE GLUCOSE BLD GHB EST-MCNC: 120 MG/DL (ref 68–126)
FASTING PATIENT LIPID ANSWER: YES
FASTING STATUS PATIENT QL REPORTED: YES
GLOBULIN PLAS-MCNC: 3.8 G/DL (ref 2.8–4.4)
GLUCOSE BLD-MCNC: 94 MG/DL (ref 70–99)
HBA1C MFR BLD: 5.8 % (ref ?–5.7)
HCT VFR BLD AUTO: 46.6 %
HDLC SERPL-MCNC: 36 MG/DL (ref 40–59)
HGB BLD-MCNC: 14.9 G/DL
IMM GRANULOCYTES # BLD AUTO: 0.01 X10(3) UL (ref 0–1)
IMM GRANULOCYTES NFR BLD: 0.1 %
LDLC SERPL CALC-MCNC: 152 MG/DL (ref ?–100)
LYMPHOCYTES # BLD AUTO: 1.89 X10(3) UL (ref 1–4)
LYMPHOCYTES NFR BLD AUTO: 25 %
MCH RBC QN AUTO: 27 PG (ref 26–34)
MCHC RBC AUTO-ENTMCNC: 32 G/DL (ref 31–37)
MCV RBC AUTO: 84.6 FL
MONOCYTES # BLD AUTO: 0.52 X10(3) UL (ref 0.1–1)
MONOCYTES NFR BLD AUTO: 6.9 %
NEUTROPHILS # BLD AUTO: 4.79 X10 (3) UL (ref 1.5–7.7)
NEUTROPHILS # BLD AUTO: 4.79 X10(3) UL (ref 1.5–7.7)
NEUTROPHILS NFR BLD AUTO: 63.3 %
NONHDLC SERPL-MCNC: 176 MG/DL (ref ?–130)
OSMOLALITY SERPL CALC.SUM OF ELEC: 291 MOSM/KG (ref 275–295)
PLATELET # BLD AUTO: 246 10(3)UL (ref 150–450)
POTASSIUM SERPL-SCNC: 3.5 MMOL/L (ref 3.5–5.1)
PROT SERPL-MCNC: 7.5 G/DL (ref 6.4–8.2)
RBC # BLD AUTO: 5.51 X10(6)UL
SODIUM SERPL-SCNC: 141 MMOL/L (ref 136–145)
T4 FREE SERPL-MCNC: 0.7 NG/DL (ref 0.8–1.7)
TRIGL SERPL-MCNC: 129 MG/DL (ref 30–149)
TSI SER-ACNC: 0.79 MIU/ML (ref 0.36–3.74)
VLDLC SERPL CALC-MCNC: 25 MG/DL (ref 0–30)
WBC # BLD AUTO: 7.6 X10(3) UL (ref 4–11)

## 2024-06-03 PROCEDURE — 84153 ASSAY OF PSA TOTAL: CPT | Performed by: FAMILY MEDICINE

## 2024-06-03 PROCEDURE — 80061 LIPID PANEL: CPT | Performed by: FAMILY MEDICINE

## 2024-06-03 PROCEDURE — 84439 ASSAY OF FREE THYROXINE: CPT | Performed by: FAMILY MEDICINE

## 2024-06-03 PROCEDURE — 80050 GENERAL HEALTH PANEL: CPT | Performed by: FAMILY MEDICINE

## 2024-06-03 PROCEDURE — 83036 HEMOGLOBIN GLYCOSYLATED A1C: CPT | Performed by: NURSE PRACTITIONER

## 2024-07-01 ENCOUNTER — OFFICE VISIT (OUTPATIENT)
Dept: INTERNAL MEDICINE CLINIC | Facility: CLINIC | Age: 59
End: 2024-07-01
Payer: COMMERCIAL

## 2024-07-01 VITALS
SYSTOLIC BLOOD PRESSURE: 148 MMHG | HEIGHT: 70 IN | DIASTOLIC BLOOD PRESSURE: 84 MMHG | HEART RATE: 60 BPM | WEIGHT: 194 LBS | BODY MASS INDEX: 27.77 KG/M2 | RESPIRATION RATE: 16 BRPM

## 2024-07-01 DIAGNOSIS — Z51.81 ENCOUNTER FOR THERAPEUTIC DRUG MONITORING: Primary | ICD-10-CM

## 2024-07-01 DIAGNOSIS — E66.3 OVERWEIGHT (BMI 25.0-29.9): ICD-10-CM

## 2024-07-01 DIAGNOSIS — K59.09 CHRONIC CONSTIPATION: ICD-10-CM

## 2024-07-01 DIAGNOSIS — I10 ESSENTIAL HYPERTENSION: ICD-10-CM

## 2024-07-01 DIAGNOSIS — Z86.39 HISTORY OF OBESITY: ICD-10-CM

## 2024-07-01 DIAGNOSIS — R73.03 PREDIABETES: ICD-10-CM

## 2024-07-01 DIAGNOSIS — E78.2 MIXED HYPERLIPIDEMIA: ICD-10-CM

## 2024-07-01 PROCEDURE — 3077F SYST BP >= 140 MM HG: CPT | Performed by: NURSE PRACTITIONER

## 2024-07-01 PROCEDURE — 99213 OFFICE O/P EST LOW 20 MIN: CPT | Performed by: NURSE PRACTITIONER

## 2024-07-01 PROCEDURE — 3079F DIAST BP 80-89 MM HG: CPT | Performed by: NURSE PRACTITIONER

## 2024-07-01 PROCEDURE — 3008F BODY MASS INDEX DOCD: CPT | Performed by: NURSE PRACTITIONER

## 2024-07-01 RX ORDER — PLECANATIDE 3 MG/1
3 TABLET ORAL DAILY
Qty: 90 TABLET | Refills: 1 | Status: SHIPPED | OUTPATIENT
Start: 2024-07-01

## 2024-07-01 RX ORDER — SYRINGE, DISPOSABLE, 1 ML
1 SYRINGE, EMPTY DISPOSABLE MISCELLANEOUS AS DIRECTED
COMMUNITY
Start: 2024-05-31

## 2024-07-01 RX ORDER — PHENTERMINE HYDROCHLORIDE 37.5 MG/1
37.5 TABLET ORAL EVERY MORNING
Qty: 30 TABLET | Refills: 3 | Status: SHIPPED | OUTPATIENT
Start: 2024-07-01

## 2024-07-01 NOTE — PROGRESS NOTES
You Guan Jr. is a 58 year old male presents today for follow-up on medical weight loss program for the treatment of overweight, obesity, or morbid obesity with HTN, prediabetes, hyperlipidemia, JAVID.    S:  Current weight   Wt Readings from Last 6 Encounters:   07/01/24 194 lb (88 kg)   05/06/24 198 lb (89.8 kg)   03/25/24 202 lb (91.6 kg)   10/16/23 202 lb (91.6 kg)   08/21/23 199 lb (90.3 kg)   04/25/23 196 lb (88.9 kg)    AND BMI Body mass index is 27.84 kg/m²..    Patient has lost 8# since LOV 3 months ago. He has been consistent with medication. Persistent constipation with BM every 2-3x/week. No success on OTC Colace or Miralax. Reviewed HgbA1c with slight increase in level, continued prediabetes. Has started tx for bladder CA. Will be receiving 6 treatments weekly. Last week was first tx. No lifestyle log provided.    Social hx reviewed. Employed as a  with commute 45min-1 hour, out of the house by 5am. . Good spousal support.    PMH reviewed. Cardiac disorders: HTN, Hypothyroid: no, Mental illness: no, Glaucoma: no, Kidney stones: yes x1 Eating disorder: no, JAVID: yes-Bipap, Migraines: no, Seizures: no, Liver disease: no, Renal disease: no, Diabetes: prediabetes, Gastroparesis: no, Constipation: no, Joint pains: no, Cancer hx: no, FMH thyroid or pancreatic cancer: no. ED.    REVIEW OF SYSTEMS:  GENERAL: feels well otherwise  LUNGS: denies shortness of breath with exertion  CARDIOVASCULAR: denies chest pain on exertion, denies palpitations or pedal edema  GI: denies abdominal pain.  No N/V/D/C  MUSCULOSKELETAL: denies acute joint or muscle pain.  NEURO: denies headaches or dizziness  PSYCH: denies change in behavior or mood, grieving reported    EXAM:  /84   Pulse 60   Resp 16   Ht 5' 10\" (1.778 m)   Wt 194 lb (88 kg)   BMI 27.84 kg/m²    GENERAL: well developed, well nourished, in no apparent distress, overweight  EYES: conjunctiva pink, sclera non icteric  LUNGS: CTA,  breathing non labored  CARDIO: RRR without murmur, normal S1 and S2 without clicks or gallops, no pedal edema.  GI: +BS  NEURO/MS: motor and sensory grossly intact  PSYCH: pleasant, cooperative, normal mood    ASSESSMENT AND PLAN:  Encounter Diagnoses   Name Primary?    Encounter for therapeutic drug monitoring Yes    Overweight (BMI 25.0-29.9)     Mixed hyperlipidemia     Essential hypertension     History of obesity     Prediabetes     Chronic constipation          No orders of the defined types were placed in this encounter.      Meds & Refills for this Visit:  Requested Prescriptions     Signed Prescriptions Disp Refills    Phentermine HCl 37.5 MG Oral Tab 30 tablet 3     Sig: Take 1 tablet (37.5 mg total) by mouth every morning.    Plecanatide (TRULANCE) 3 MG Oral Tab 90 tablet 1     Sig: Take 3 mg by mouth daily. For constipation       Imaging & Consults:  None      Plan:  Patient has lost 8# since LOV 3 months ago on Wegovy 2.4 mg weekly, phentermine 37.5 mg daily, Topamax 50 mg BID, Jardiance 10 mg daily with a total weight loss of 96# since initial consult on 8/19/2014 with initial weight of 292#. Weight loss goal: weigh under 200#. CPM, Add Trulance for constipation. Hx of Miralax and Colace without success. Hx of Metformin ER. Mood swings on Wellbutrin so no Contrave.  Advise home BP monitoring- elevated today. See patient instructions below for additional plans and patient counseling.      Patient Instructions   Continue making lifestyle changes that focus on good nutrition, regular exercise and stress management.    Medication Plan: Continue current medication regimen aside from add Trulance 3 mg daily for constipation.    Agreed upon goal/s before next office visit include:      Your blood pressure was elevated today at 148/84. Please monitor at home and follow up with your primary care provider.    What is high blood pressure?  High blood pressure, (also referred to as hypertension), is when your  blood pressure, the force of blood flowing through your blood vessels, is consistently too high. Learn more about high blood pressure at https://www.heart.org/en/health-topics/high-blood-pressure.    If you have high blood pressure, you are not alone.  Nearly half of American adults have high blood pressure. (Many don’t even know they have it.)  The best way to know if you have high blood pressure it is to have your blood pressure checked.    Know your numbers.  Learn about your blood pressure numbers and what they mean.    BLOOD PRESSURE (BP) CATEGORY SYSTOLIC mm Hg (upper number) And/or DIASTOLIC mm Hg (lower number)   Normal Less than 120 and Less than 80   Elevated 120-129 and Less than 80   High BP: HTN Stage 1 130-139 or 80-89   High BP: HTN Stage 2 140 or higher or 90 or higher   Hypertensive Crisis Higher than 180 And/or Higher than 120     High blood pressure is a 'silent killer.'  Most of the time there are no obvious symptoms.  Certain physical traits and lifestyle choices can put you at a greater risk for high blood pressure.  When left untreated, the damage that high blood pressure does to your circulatory system is a significant contributing factor to heart attack, stroke and other health threats.          Medication use and SEs reviewed with patient.    Return in about 4 months (around 11/1/2024) for weight management via clinic.    Patient verbalizes understanding.

## 2024-07-01 NOTE — PATIENT INSTRUCTIONS
Continue making lifestyle changes that focus on good nutrition, regular exercise and stress management.    Medication Plan: Continue current medication regimen aside from add Trulance 3 mg daily for constipation.    Agreed upon goal/s before next office visit include:      Your blood pressure was elevated today at 148/84. Please monitor at home and follow up with your primary care provider.    What is high blood pressure?  High blood pressure, (also referred to as hypertension), is when your blood pressure, the force of blood flowing through your blood vessels, is consistently too high. Learn more about high blood pressure at https://www.heart.org/en/health-topics/high-blood-pressure.    If you have high blood pressure, you are not alone.  Nearly half of American adults have high blood pressure. (Many don’t even know they have it.)  The best way to know if you have high blood pressure it is to have your blood pressure checked.    Know your numbers.  Learn about your blood pressure numbers and what they mean.    BLOOD PRESSURE (BP) CATEGORY SYSTOLIC mm Hg (upper number) And/or DIASTOLIC mm Hg (lower number)   Normal Less than 120 and Less than 80   Elevated 120-129 and Less than 80   High BP: HTN Stage 1 130-139 or 80-89   High BP: HTN Stage 2 140 or higher or 90 or higher   Hypertensive Crisis Higher than 180 And/or Higher than 120     High blood pressure is a 'silent killer.'  Most of the time there are no obvious symptoms.  Certain physical traits and lifestyle choices can put you at a greater risk for high blood pressure.  When left untreated, the damage that high blood pressure does to your circulatory system is a significant contributing factor to heart attack, stroke and other health threats.

## 2024-07-03 ENCOUNTER — TELEPHONE (OUTPATIENT)
Dept: INTERNAL MEDICINE CLINIC | Facility: CLINIC | Age: 59
End: 2024-07-03

## 2024-07-03 NOTE — TELEPHONE ENCOUNTER
Working on pa for trulance.  Need to know if he has tried and failed amitiza and linzess for 30 days or has intolerance to them.  Sent my chart to patient  Linzess was ordered in past - did he try for 30 days?

## 2024-07-24 ENCOUNTER — TELEPHONE (OUTPATIENT)
Dept: INTERNAL MEDICINE CLINIC | Facility: CLINIC | Age: 59
End: 2024-07-24

## 2024-07-24 NOTE — TELEPHONE ENCOUNTER
Notice from [facility] via fax stating prescriber not in network - send under alternative MD?  Need to call [facility] when they open

## 2024-07-24 NOTE — TELEPHONE ENCOUNTER
I caled [facility] to ask about message on fax - asking for alternate provider?  [facility] said we can call help desk at   [facility]  Patient ID is [id number]

## 2024-08-05 DIAGNOSIS — Z86.39 HISTORY OF OBESITY: ICD-10-CM

## 2024-08-05 DIAGNOSIS — E66.3 OVERWEIGHT (BMI 25.0-29.9): ICD-10-CM

## 2024-08-05 DIAGNOSIS — Z51.81 ENCOUNTER FOR THERAPEUTIC DRUG MONITORING: ICD-10-CM

## 2024-08-05 RX ORDER — TOPIRAMATE 50 MG/1
50 TABLET, FILM COATED ORAL 2 TIMES DAILY
Qty: 180 TABLET | Refills: 1 | OUTPATIENT
Start: 2024-08-05

## 2024-08-05 NOTE — TELEPHONE ENCOUNTER
Requesting topirimate  LOV: 7/1/24  RTC: 4 months  Filled: 3/25/24 #180 with 1 refills    Future Appointments   Date Time Provider Department Center   11/18/2024 10:00 AM Mari Romero APRN [id number]     Too soon for refill

## 2024-08-20 NOTE — TELEPHONE ENCOUNTER
PA created in [facility] for Linzess 72mg-awaiting insurance response    Key: [id number]

## 2024-09-24 NOTE — TELEPHONE ENCOUNTER
Linzess approved in [facility]  Outcome  Approved on August 20 by Ramón 2017 NCPDP  Request Reference Number: [id number]. LINZESS CAP 72MCG is approved through 08/20/2025. Your patient may now fill this prescription and it will be covered.  Authorization Expiration Date: 8/20/2025  Drug  Linzess 72MCG capsules

## 2024-09-29 DIAGNOSIS — I10 ESSENTIAL HYPERTENSION: ICD-10-CM

## 2024-09-29 DIAGNOSIS — E78.2 MIXED HYPERLIPIDEMIA: ICD-10-CM

## 2024-09-29 DIAGNOSIS — R73.03 PRE-DIABETES: ICD-10-CM

## 2024-09-29 DIAGNOSIS — Z51.81 ENCOUNTER FOR THERAPEUTIC DRUG MONITORING: ICD-10-CM

## 2024-09-30 RX ORDER — EMPAGLIFLOZIN 10 MG/1
10 TABLET, FILM COATED ORAL DAILY
Qty: 90 TABLET | Refills: 1 | Status: SHIPPED | OUTPATIENT
Start: 2024-09-30

## 2024-09-30 NOTE — TELEPHONE ENCOUNTER
Requesting jardiance 10mg  LOV: 7/1/24  RTC: 4 months  Filled: 3/25/24 #90 with 1 refills    Future Appointments   Date Time Provider Department Center   11/18/2024 10:00 AM Mari Romero APRN [id number]

## 2024-11-06 ENCOUNTER — PATIENT MESSAGE (OUTPATIENT)
Dept: FAMILY MEDICINE CLINIC | Facility: CLINIC | Age: 59
End: 2024-11-06

## 2024-11-18 ENCOUNTER — OFFICE VISIT (OUTPATIENT)
Dept: INTERNAL MEDICINE CLINIC | Facility: CLINIC | Age: 59
End: 2024-11-18
Payer: COMMERCIAL

## 2024-11-18 VITALS
HEIGHT: 70 IN | OXYGEN SATURATION: 98 % | SYSTOLIC BLOOD PRESSURE: 140 MMHG | RESPIRATION RATE: 16 BRPM | BODY MASS INDEX: 29.78 KG/M2 | HEART RATE: 64 BPM | WEIGHT: 208 LBS | DIASTOLIC BLOOD PRESSURE: 80 MMHG

## 2024-11-18 DIAGNOSIS — Z86.39 HISTORY OF OBESITY: ICD-10-CM

## 2024-11-18 DIAGNOSIS — Z51.81 ENCOUNTER FOR THERAPEUTIC DRUG MONITORING: Primary | ICD-10-CM

## 2024-11-18 DIAGNOSIS — E66.3 OVERWEIGHT (BMI 25.0-29.9): ICD-10-CM

## 2024-11-18 DIAGNOSIS — E78.2 MIXED HYPERLIPIDEMIA: ICD-10-CM

## 2024-11-18 DIAGNOSIS — R73.03 PREDIABETES: ICD-10-CM

## 2024-11-18 DIAGNOSIS — I10 ESSENTIAL HYPERTENSION: ICD-10-CM

## 2024-11-18 PROCEDURE — 99213 OFFICE O/P EST LOW 20 MIN: CPT | Performed by: NURSE PRACTITIONER

## 2024-11-18 PROCEDURE — 3008F BODY MASS INDEX DOCD: CPT | Performed by: NURSE PRACTITIONER

## 2024-11-18 PROCEDURE — 3077F SYST BP >= 140 MM HG: CPT | Performed by: NURSE PRACTITIONER

## 2024-11-18 PROCEDURE — 3079F DIAST BP 80-89 MM HG: CPT | Performed by: NURSE PRACTITIONER

## 2024-11-18 RX ORDER — PHENTERMINE HYDROCHLORIDE 37.5 MG/1
37.5 TABLET ORAL EVERY MORNING
Qty: 30 TABLET | Refills: 3 | Status: SHIPPED | OUTPATIENT
Start: 2024-11-18

## 2024-11-18 RX ORDER — NEEDLES, DISPOSABLE 25GX5/8"
NEEDLE, DISPOSABLE MISCELLANEOUS
COMMUNITY
Start: 2024-07-08

## 2024-11-18 RX ORDER — TOPIRAMATE 100 MG/1
100 TABLET, FILM COATED ORAL 2 TIMES DAILY
Qty: 180 TABLET | Refills: 1 | Status: SHIPPED | OUTPATIENT
Start: 2024-11-18

## 2024-11-18 NOTE — PATIENT INSTRUCTIONS
Continue making lifestyle changes that focus on good nutrition, regular exercise and stress management.    Medication Plan: Continue current medication regimen aside from increase Topamax to 100 mg twice a day.    Agreed upon goal/s before next office visit include:      Your blood pressure was elevated today at 140/80. Please take your blood pressure medication and monitor at home and follow up with your primary care provider if remains elevated.    What is high blood pressure?  High blood pressure, (also referred to as hypertension), is when your blood pressure, the force of blood flowing through your blood vessels, is consistently too high. Learn more about high blood pressure at https://www.heart.org/en/health-topics/high-blood-pressure.    If you have high blood pressure, you are not alone.  Nearly half of American adults have high blood pressure. (Many don’t even know they have it.)  The best way to know if you have high blood pressure it is to have your blood pressure checked.    Know your numbers.  Learn about your blood pressure numbers and what they mean.    BLOOD PRESSURE (BP) CATEGORY SYSTOLIC mm Hg (upper number) And/or DIASTOLIC mm Hg (lower number)   Normal Less than 120 and Less than 80   Elevated 120-129 and Less than 80   High BP: HTN Stage 1 130-139 or 80-89   High BP: HTN Stage 2 140 or higher or 90 or higher   Hypertensive Crisis Higher than 180 And/or Higher than 120     High blood pressure is a 'silent killer.'  Most of the time there are no obvious symptoms.  Certain physical traits and lifestyle choices can put you at a greater risk for high blood pressure.  When left untreated, the damage that high blood pressure does to your circulatory system is a significant contributing factor to heart attack, stroke and other health threats.    Holiday Weight and How to Avoid It    Obesity Action Coalition by Sabrina Flaherty,  PhD  https://www.obesityaction.org/resources/holiday-weight-and-pmm-ly-ipngb-it/      When we think about the holidays many things come to mind - gifts, shopping, parties, family, decorating, long to-do lists --and delicious holiday treats.    Strategies for Avoiding Holiday Weight Gain  The holiday season is a busy time, and there are eating opportunities everywhere we go, such as family gatherings, office parties with trays of home-baked treats in the lunchroom, holiday and [id number] programs at our kids’ schools, treat samples being given away as we make our way through the stores to do our holiday shopping and catalogs in our mailboxes with mouth-watering photo spreads on every page. We’re really busy, perhaps too busy to prepare the healthy meals we might otherwise prepare.    Here are a few tips that will help you negotiate this joyful time with minimum risk to your weight management goals:    Focus on maintaining your current weight. Challenging yourself to lose weight over the holidays is setting yourself up for failure.  Don’t gorge on any special holiday food because you only get to eat it once a year. With luck, you’ll still be around to enjoy it next year. On the other hand, don’t deprive yourself of anything you want to taste. Instead, take a mindful bite, savoring the sight, taste, aroma, mouth feel and sound of each special holiday treat. Eating like this leads to increased pleasure, quicker satisfaction and decreased risk for weight gain.  Avoid the trap of thinking you can eat what you want because you can just start over in the New Year. It doesn’t get any easier just because it’s January - there are always other reasons to indulge and to celebrate.  Keep up your exercise routine. This will also help reduce holiday stress.  Keep tabs on yourself. Write down what you eat, weigh yourself if you want to or try on your favorite clothes to make sure they still fit.  Create meaning beyond the food  by creating new traditions that have nothing to do with food. For example, change “in our family we always have chocolate cinnamon bread with whipped cream on Christmas morning” into “in our family we always play in the snow (or on the beach), or go for a long walk/take food and gifts to the homeless shelter on Christmas morning.”  Sometimes, eating a particular food is our way of remembering a lost loved one. If that applies to you, find another way to remember them, like sharing memories with family members.  Remember all the reasons why reaching and maintaining a healthy weight is important to you.  Remember, unless you’re an elite athlete, you’re unlikely to be able to “exercise off” weeks of overindulgence.  Strategies for Holiday Parties  Most of us love holiday parties and look forward to them all year. We get to dress up and go to nice places, spend time with our nearest and dearest, enjoy our favorite holiday music and engage in the traditions that are meaningful to us. Despite all of the excitement, parties can also be minefields when it comes to honoring our healthy lifestyle goals. When we love parties, we may over-indulge as a way of intensifying the positive emotions we’re already feeling, and when we dislike parties, we may over-indulge in an effort to distract ourselves from the emotional discomfort that we’re feeling.    Here are a few tips that will help you get through holiday parties without sabotaging your goals:    Avoid wearing baggy clothing that allows you to expand as you eat.  After you’ve eaten, stay away from the food tables at the party.  Keep your hands busy by finding a way to help out. It’s the best way to distract yourself from the food.  Avoid alcohol. When we drink, we’re more likely to abandon healthy eating.  Fill up with water and other low-calorie drinks.  Take a healthy dish for the pot luck - something you can eat: consider salad, fruit, raw vegetables and a healthy  dip.  Focus on your relationships, not on the food - learn to focus on enjoying the people and the special holiday experiences, on building special memories for yourself and your family.  Meeting new people is another good way of distracting yourself from the food. If you’re shy, simply be a good listener.  Plan ahead. The best kind of plan, when it comes to food, is about what you are going to eat - not about what you’re not going to eat. If we focus on what we can’t eat (or what we think we shouldn’t eat), this kind of thinking can set us up for failure because it simply leaves us feeling deprived.  Don’t arrive completely famished - you’ll be more likely to eat in a way you’ll later regret. Plan to eat on the light side both before and after the event. Think about your meal plan for the day, and leave yourself some room to eat at the party.  Coping with Holiday Stress  As you know, the holiday season can be joyful and stressful at the same time. There’s so much to do, being around family can sometimes be difficult and often, we set ourselves the goal of creating the “perfect” holiday. Being stressed puts us at risk for stress-based eating in an effort to cope.    Here are some strategies you can use to reduce your stress levels:    Focus on what you’re grateful for.  Practice deep breathing whenever you feel overwhelmed.  Keep up your exercise routine.  Remind yourself to do just one thing at a time.  Remember -- you cannot do more than your best.  Be willing to say “no” to some events, tasks or requests. Sometimes this is the best way we can take care of ourselves.  Create a holiday season schedule for yourself. Schedule and prioritize everything you need to get done.  Reduce your expectations - aim for “good enough,” not “perfect.”  If you’re alone during the holidays, pamper yourself and find a way to help others who are less fortunate. This will help reduce your loneliness.  If your relationships with family  members are strained, remember that over-indulging in your favorite holiday comfort foods is not going to change how they behave towards you!  Create fun times for yourself. Having fun is a great way of reducing stress!  I hope these tips will help you not just get through the holidays, but that they’ll allow you to feel reassured that you can still have a fun and meaningful time without having to sacrifice your weight management goals. Wishing you a happy, healthy and meaningful holiday season!      Sugar - It’s Not as Sweet as You Think    by Yuliana Cm RN, BSN, CBN  OAC Summer 2014 @ https://www.obesityaction.org/resources/sugar-its-not-as-sweet-as-you-think/    According to the [place] Department of Agriculture (USDA), dietary trends from 7562-8939, sugar consumption increased by 19 percent since 1970. Today, the average American consumes 20 teaspoons or 100 pounds of sugar each year! That amounts to 300 calories a day from sugar alone. And to make matters worse (you will read why later in this article), corn syrup consumption is on the rise, increasing by 387 percent in the same period of time. The largest amount of sugar is not being consumed in ingested fruits or other natural sugars. The largest amounts of consumed sugars are in the form of High Fructose Corn Syrup (HFCS) and brown sugar. Neither occurs naturally, and both are highly processed and in nearly every processed package food you will find in your local grocery store. Many of these foods you most likely would not suspect having sugar as an additive (see quiz at bottom of page).    What is sugar?  Sugar is a simple carbohydrate, which can either be a monosaccharide or disaccharide. Monosaccharides include glucose, fructose, and galactose. These three monosaccharides can join together to make the disaccharides maltose, sucrose, and lactose. These compounds are found in the foods we eat and are collectively called “sugar.”    The following  are types of sugar and their natural source:    Most people associate the term “sugar” with the white sugar we put in coffee or iced tea. The human body uses glucose, the simplest unit of carbohydrate, as its primary fuel. Without adequate carbohydrate intake, our bodies will obtain glucose, or fuel, from another source. The possibilities include a breakdown of proteins we eat or proteins stored in our body, which may ultimately lead to muscle loss and affecting one’s metabolic rate or even malnutrition. However, our need is far below the current daily consumption.    Is sugar addictive? You be the .  When high doses of sugar are consumed, it stimulates the release of dopamine in our brains. This response makes us feel pleasure (now you know why when you feel down or depressed you may want to overindulge in sweets). The drug morphine, cocaine and sugar all stimulate the same brain receptors. This study has been proven many times in lab rat studies.    In his new and fascinating book, Salt Sugar Fat: How the Food Giants Hooked Us, Pulitzer Prize-winning  Ba Joe (I highly recommend this book) goes inside the world of processed and packaged foods. Heath writes in detail about how the food industry (which is 17 percent of our economy) contributes to American’s obesity epidemic by infusing processed foods with sugar, salt, and fat to make it more addictive and pleasurable. You see now why so many continue to buy their products?    According to the [facility] Food Policy and Obesity, the average child sees 5,500 food commercials a year that advertise high sugar breakfast cereals, fast food, soft drinks, candy and snacks. According to the Federal Trade Commission, the food industry spends $1.6 billion annually to reach children through the media, including the Internet.    What is high fructose corn syrup?  High fructose corn syrup (HFCS) is an industrial food product and not “natural” or a  naturally occurring substance. It is extracted from corn stalks through a secret process. The sugars are extracted through a chemical enzymatic process resulting in HFCS.    Regular cane sugar (sucrose) is made of two-sugar molecules bound tightly together - glucose and fructose in equal amounts. The enzymes in your digestive tract must break down the sucrose into glucose and fructose, which are then absorbed into the body.    HFCS also consists of glucose and fructose, not in a 50-50 ratio, but a 55-45 fructose to glucose ratio in an unbound form. Fructose is sweeter than glucose. And HCFS is cheaper than sugar. One of the reasons is because of the government farm bill corn subsidies. Products with HFCS are much sweeter and much cheaper than products made with cane sugar.    Sugar and HFCS’ Effect on the Body  Eating sugar has a systemic effect on your entire body including increased risk for diabetes, increased appetite, weight gain, heart and liver problems, decreased immune system, certain cancers and even your brain function to name a few.    Type 2 Diabetes  Type 1 Diabetes is when one’s pancreas does not make insulin. Type 2 Diabetes is when one’s body does not utilize insulin effectively. Type 1 Diabetes is usually diagnosed at a young age. Type 2 Diabetes used to occur in adulthood and was called “Adult Onset Diabetes,” however; it has since been renamed to Type 2 Diabetes because the onset is commonly seen at a much earlier age as the obesity epidemic increases. It has been estimated that just fewer than 2,000,000 individuals were diagnosed with Type 2 diabetes in 2010.    The pancreas acts on ingested sugar by secreting insulin. Insulin is a hormone that regulates the amount of sugar in the blood. If blood sugar gets too high or too low, it could be life-threatening. An increased amount of sugar in one’s diet causes the pancreas to secrete insulin. In some individuals, this leads to an overload on the  pancreas and the development of Type 2 diabetes.    Sugar, Appetite & Weight Gain  Eating less sugar is linked with weight-loss, and eating more is linked with weight gain, according to a new review of published studies. The review lends support to the idea that advising people to limit the sugar in their diets may help lessen excess weight and obesity, the researchers conclude. “The really interesting finding is that increasing and decreasing sugar had virtually identical results (on weight), in the opposite direction of course,” says researcher YULISSA Cedillo, PhD, professor of human nutrition and medicine at the [facility] in [facility].    According to leading nutritional expert, Evens Sarabia MD, PhD, MPH, chair of nutrition at [employer] and author of Eat, Drink and Be Healthy, “Sugar increases body weight mainly by encouraging overeating.”    Heart and Liver Damage  A study published in the journal Hepatology in late 2012 found that consumption of fructose appears to affect the availability of the energy-transferring chemical ATP in the liver, thereby increasing the risk of liver cell malfunction and death.    In another review of HFCS, The American Journal of Clinical Nutrition, Juni Kuhn, PhD, Department of Nutrition, [facility] explains that HFCS is absorbed more rapidly than regular sugar, and that it doesn’t stimulate insulin or leptin production. This prevents you from triggering the body’s signals for being full and may lead to overconsumption of total calories.    A 2012 paper in the journal Nature, brought forward the idea that limitations and warnings should be placed on sugar similar to warnings we see on alcohol. The authors showed evidence that fructose and glucose in excess can have a toxic effect on the liver as the metabolism of ethanol (the alcohol contained in alcoholic beverages) had similarities to the metabolic pathways of  fructose.    Another published study in the Journal of Nutrition in 2012, found that children who consumed high levels of fructose had lower blood levels of cardiovascular protective compounds, such as HDL cholesterol and adiponectin. Higher consumption of fructose led to higher levels of fat around the midsection, a significant risk factor for diabetes and cardiovascular disease.    Immune System  Eliminating all sugar from a cancer patient’s diet would harm healthy cells that need energy to function. For example, many fruits contain high levels of antioxidants which are known to be effective in fighting cancer; however, sugars that come from whole fruits are low in sugar. Plant-based nutrition is a benefit to our overall health including fighting or preventing cancers. These important antioxidants, phytochemicals, fiber, vitamins and minerals are found in these plant-based whole foods.    However, diets high in sugar and refined carbohydrates can lead to overweight and obesity, which indirectly increases cancer risk throughout time. Certain cancers including breast, prostate, colorectal and pancreatic are associated with obesity.    How can you avoid these unhealthy consequences of (often hidden) sugar?  The best way to avoid sugar is to not consume obvious foods that are loaded with sugar. However, as discussed in this article, there are many packaged foods that surprisingly have added sugar and the more health-damaging high fructose corn syrup.  Therefore…    Eat nature’s foods  Avoid processed food (I call these factory foods, not real foods.)  Don’t eat foods in packages (or dramatically decrease consumption)  Eat foods that rot  Eat foods that walked the earth, flew in the imtiaz, swam in the ocean or grew in the soil     Beware!  Typically, the first ingredient on a label is the most prevalent in the food product; however, beware because there may be small amounts of many types of sugars, so none of them  end up being in the first few ingredients (top 3) of the label. Sugar is disguised as a “healthy” ingredient, such as honey, rice syrup, or even “organic dehydrated cane juice.”    Here is a list of some of the possible “sugar” code words:    Corn sweetener  Corn syrup, or corn syrup solids  Dehydrated Cane Juice  Dextrin  Dextrose  Fructose  Fruit juice concentrate  Glucose  High-fructose corn syrup  Honey  Invert sugar  Lactose  Maltodextrin  Malt syrup  Maltose  Maple syrup  Molasses  Raw sugar  Rice syrup  Saccharose  Sorghum or sorghum syrup  Sucrose  Syrup  Treacle  Turbinado sugar  Xylose    High Fructose Corn Sugar (HFCS) Quiz:    Which of the below listed foods contain High Fructose Corn Syrup?    Kraft Macaroni and Cheese  Stove Top Stuffing - Home style Herb  Shea Sun Iced Tea  Ocean Spray Cranberry Juice  Wild Cherry Lifesavers  Robitussan Cough and Congestion  Wonderbread - White Bread  Smuckers Grape Jelly  Leno’s Vegetable Soup  Mr & Mrs T Bloody Shanice Mix  Nabisco Fig Newtons - Whole Grain  Nabisco Fig Newtons - Fat Free  Wishbone Classic Caesars’ Dressing  Chicken of the Sea White Tuna, Spring [place]    Answer: All    Additional Resources:    The Truth About Sugar - documentary on sugar free on Utube @ https://youtu.be/3M1fucrFK5p  SUGAR: The Bitter Truth by Dr. Barry Colon (pediatric endocrinologist) - Lecture on sugar for free on  Utube @ [email or web address] Innovationsu.be/[email or web address]-[email or web address]

## 2024-11-18 NOTE — PROGRESS NOTES
You Guan Jr. is a 59 year old male presents today for follow-up on medical weight loss program for the treatment of overweight, obesity, or morbid obesity with HTN, prediabetes, hyperlipidemia, JAVID.    S:  Current weight   Wt Readings from Last 6 Encounters:   11/18/24 208 lb (94.3 kg)   07/01/24 194 lb (88 kg)   05/06/24 198 lb (89.8 kg)   03/25/24 202 lb (91.6 kg)   10/16/23 202 lb (91.6 kg)   08/21/23 199 lb (90.3 kg)    AND BMI Body mass index is 29.84 kg/m²..    Patient has lost -14# since LOV 4 months ago. He has been consistent with medication aside form being off Wegovy for 2 months d/t insurance requiring tx at preferred site in which he would have to be seen monthly and prefers not to do. Since off medication reports he has been staying within a 10# weight fluctuation. Constipation resolved off Wegovy. Finished with CA tx with new scope showing resolution and repeat due in 4/2025. No lifestyle log completed. No changes reported.    Social hx reviewed. Employed as a  with commute 45min-1 hour, out of the house by 5am. . Good spousal support.    PMH reviewed. Cardiac disorders: HTN, Hypothyroid: no, Mental illness: no, Glaucoma: no, Kidney stones: yes x1 Eating disorder: no, JAVID: yes-Bipap, Migraines: no, Seizures: no, Liver disease: no, Renal disease: no, Diabetes: prediabetes, Gastroparesis: no, Constipation: no, Joint pains: no, Cancer hx: no, FMH thyroid or pancreatic cancer: no. ED.    REVIEW OF SYSTEMS:  GENERAL: feels well otherwise  LUNGS: denies shortness of breath with exertion  CARDIOVASCULAR: denies chest pain on exertion, denies palpitations or pedal edema  GI: denies abdominal pain.  No N/V/D/C  MUSCULOSKELETAL: denies acute joint or muscle pain.  NEURO: denies headaches or dizziness  PSYCH: denies change in behavior or mood, grieving reported    EXAM:  /80   Pulse 64   Resp 16   Ht 5' 10\" (1.778 m)   Wt 208 lb (94.3 kg)   SpO2 98%   BMI 29.84 kg/m²   Patient  forgot to take BP medication today.  GENERAL: well developed, well nourished, in no apparent distress, overweight  EYES: conjunctiva pink, sclera non icteric  LUNGS: CTA, breathing non labored  CARDIO: RRR without murmur, normal S1 and S2 without clicks or gallops, no pedal edema.  GI: +BS  NEURO/MS: motor and sensory grossly intact  PSYCH: pleasant, cooperative, normal mood    ASSESSMENT AND PLAN:  Encounter Diagnoses   Name Primary?    Encounter for therapeutic drug monitoring Yes    Overweight (BMI 25.0-29.9)     Essential hypertension     Mixed hyperlipidemia     Prediabetes     History of obesity            No orders of the defined types were placed in this encounter.      Meds & Refills for this Visit:  Requested Prescriptions     Signed Prescriptions Disp Refills    Phentermine HCl 37.5 MG Oral Tab 30 tablet 3     Sig: Take 1 tablet (37.5 mg total) by mouth every morning.    topiramate 100 MG Oral Tab 180 tablet 1     Sig: Take 1 tablet (100 mg total) by mouth 2 (two) times daily.       Imaging & Consults:  None      Plan:  Patient has lost -14# since LOV 4 months ago off Wegovy 2.4 mg weekly, on phentermine 37.5 mg daily, Topamax 50 mg BID, Jardiance 10 mg daily with a total weight loss of 82# since initial consult on 8/19/2014 with initial weight of 292#. Weight loss goal: weigh under 200#. Advise home BP monitoring- elevated today. CPM, aside from increase Topamax as directed. Hx of Miralax and Colace without success. Hx of Metformin ER. Mood swings on Wellbutrin so no Contrave.  on holiday tips and sugar. See patient instructions below for additional plans and patient counseling.      Patient Instructions   Continue making lifestyle changes that focus on good nutrition, regular exercise and stress management.    Medication Plan: Continue current medication regimen aside from increase Topamax to 100 mg twice a day.    Agreed upon goal/s before next office visit include:      Your blood pressure was  elevated today at 140/80. Please take your blood pressure medication and monitor at home and follow up with your primary care provider if remains elevated.    What is high blood pressure?  High blood pressure, (also referred to as hypertension), is when your blood pressure, the force of blood flowing through your blood vessels, is consistently too high. Learn more about high blood pressure at https://www.heart.org/en/health-topics/high-blood-pressure.    If you have high blood pressure, you are not alone.  Nearly half of American adults have high blood pressure. (Many don’t even know they have it.)  The best way to know if you have high blood pressure it is to have your blood pressure checked.    Know your numbers.  Learn about your blood pressure numbers and what they mean.    BLOOD PRESSURE (BP) CATEGORY SYSTOLIC mm Hg (upper number) And/or DIASTOLIC mm Hg (lower number)   Normal Less than 120 and Less than 80   Elevated 120-129 and Less than 80   High BP: HTN Stage 1 130-139 or 80-89   High BP: HTN Stage 2 140 or higher or 90 or higher   Hypertensive Crisis Higher than 180 And/or Higher than 120     High blood pressure is a 'silent killer.'  Most of the time there are no obvious symptoms.  Certain physical traits and lifestyle choices can put you at a greater risk for high blood pressure.  When left untreated, the damage that high blood pressure does to your circulatory system is a significant contributing factor to heart attack, stroke and other health threats.    Holiday Weight and How to Avoid It    Obesity Action Coalition by Sabrina Flaherty, PhD  https://www.obesityaction.org/resources/holiday-weight-and-mhb-iy-qyicc-it/      When we think about the holidays many things come to mind - gifts, shopping, parties, family, decorating, long to-do lists --and delicious holiday treats.    Strategies for Avoiding Holiday Weight Gain  The holiday season is a busy time, and there are eating opportunities everywhere we  go, such as family gatherings, office parties with trays of home-baked treats in the lunchroom, holiday and [id number] programs at our kids’ schools, treat samples being given away as we make our way through the stores to do our holiday shopping and catalogs in our mailboxes with mouth-watering photo spreads on every page. We’re really busy, perhaps too busy to prepare the healthy meals we might otherwise prepare.    Here are a few tips that will help you negotiate this joyful time with minimum risk to your weight management goals:    Focus on maintaining your current weight. Challenging yourself to lose weight over the holidays is setting yourself up for failure.  Don’t gorge on any special holiday food because you only get to eat it once a year. With luck, you’ll still be around to enjoy it next year. On the other hand, don’t deprive yourself of anything you want to taste. Instead, take a mindful bite, savoring the sight, taste, aroma, mouth feel and sound of each special holiday treat. Eating like this leads to increased pleasure, quicker satisfaction and decreased risk for weight gain.  Avoid the trap of thinking you can eat what you want because you can just start over in the New Year. It doesn’t get any easier just because it’s January - there are always other reasons to indulge and to celebrate.  Keep up your exercise routine. This will also help reduce holiday stress.  Keep tabs on yourself. Write down what you eat, weigh yourself if you want to or try on your favorite clothes to make sure they still fit.  Create meaning beyond the food by creating new traditions that have nothing to do with food. For example, change “in our family we always have chocolate cinnamon bread with whipped cream on [place] morning” into “in our family we always play in the snow (or on the beach), or go for a long walk/take food and gifts to the homeless shelter on Christmas morning.”  Sometimes, eating a particular food is  our way of remembering a lost loved one. If that applies to you, find another way to remember them, like sharing memories with family members.  Remember all the reasons why reaching and maintaining a healthy weight is important to you.  Remember, unless you’re an elite athlete, you’re unlikely to be able to “exercise off” weeks of overindulgence.  Strategies for Holiday Parties  Most of us love holiday parties and look forward to them all year. We get to dress up and go to nice places, spend time with our nearest and dearest, enjoy our favorite holiday music and engage in the traditions that are meaningful to us. Despite all of the excitement, parties can also be minefields when it comes to honoring our healthy lifestyle goals. When we love parties, we may over-indulge as a way of intensifying the positive emotions we’re already feeling, and when we dislike parties, we may over-indulge in an effort to distract ourselves from the emotional discomfort that we’re feeling.    Here are a few tips that will help you get through holiday parties without sabotaging your goals:    Avoid wearing baggy clothing that allows you to expand as you eat.  After you’ve eaten, stay away from the food tables at the party.  Keep your hands busy by finding a way to help out. It’s the best way to distract yourself from the food.  Avoid alcohol. When we drink, we’re more likely to abandon healthy eating.  Fill up with water and other low-calorie drinks.  Take a healthy dish for the pot luck - something you can eat: consider salad, fruit, raw vegetables and a healthy dip.  Focus on your relationships, not on the food - learn to focus on enjoying the people and the special holiday experiences, on building special memories for yourself and your family.  Meeting new people is another good way of distracting yourself from the food. If you’re shy, simply be a good listener.  Plan ahead. The best kind of plan, when it comes to food, is about what  you are going to eat - not about what you’re not going to eat. If we focus on what we can’t eat (or what we think we shouldn’t eat), this kind of thinking can set us up for failure because it simply leaves us feeling deprived.  Don’t arrive completely famished - you’ll be more likely to eat in a way you’ll later regret. Plan to eat on the light side both before and after the event. Think about your meal plan for the day, and leave yourself some room to eat at the party.  Coping with Holiday Stress  As you know, the holiday season can be joyful and stressful at the same time. There’s so much to do, being around family can sometimes be difficult and often, we set ourselves the goal of creating the “perfect” holiday. Being stressed puts us at risk for stress-based eating in an effort to cope.    Here are some strategies you can use to reduce your stress levels:    Focus on what you’re grateful for.  Practice deep breathing whenever you feel overwhelmed.  Keep up your exercise routine.  Remind yourself to do just one thing at a time.  Remember -- you cannot do more than your best.  Be willing to say “no” to some events, tasks or requests. Sometimes this is the best way we can take care of ourselves.  Create a holiday season schedule for yourself. Schedule and prioritize everything you need to get done.  Reduce your expectations - aim for “good enough,” not “perfect.”  If you’re alone during the holidays, pamper yourself and find a way to help others who are less fortunate. This will help reduce your loneliness.  If your relationships with family members are strained, remember that over-indulging in your favorite holiday comfort foods is not going to change how they behave towards you!  Create fun times for yourself. Having fun is a great way of reducing stress!  I hope these tips will help you not just get through the holidays, but that they’ll allow you to feel reassured that you can still have a fun and meaningful  time without having to sacrifice your weight management goals. Wishing you a happy, healthy and meaningful holiday season!      Sugar - It’s Not as Sweet as You Think    by Yuliana Cm RN, BSN, CBN  OAC Summer 2014 @ https://www.obesityaction.org/resources/sugar-its-not-as-sweet-as-you-think/    According to the [place] Department of Agriculture (USDA), dietary trends from 4131-9060, sugar consumption increased by 19 percent since 1970. Today, the average American consumes 20 teaspoons or 100 pounds of sugar each year! That amounts to 300 calories a day from sugar alone. And to make matters worse (you will read why later in this article), corn syrup consumption is on the rise, increasing by 387 percent in the same period of time. The largest amount of sugar is not being consumed in ingested fruits or other natural sugars. The largest amounts of consumed sugars are in the form of High Fructose Corn Syrup (HFCS) and brown sugar. Neither occurs naturally, and both are highly processed and in nearly every processed package food you will find in your local grocery store. Many of these foods you most likely would not suspect having sugar as an additive (see quiz at bottom of page).    What is sugar?  Sugar is a simple carbohydrate, which can either be a monosaccharide or disaccharide. Monosaccharides include glucose, fructose, and galactose. These three monosaccharides can join together to make the disaccharides maltose, sucrose, and lactose. These compounds are found in the foods we eat and are collectively called “sugar.”    The following are types of sugar and their natural source:    Most people associate the term “sugar” with the white sugar we put in coffee or iced tea. The human body uses glucose, the simplest unit of carbohydrate, as its primary fuel. Without adequate carbohydrate intake, our bodies will obtain glucose, or fuel, from another source. The possibilities include a breakdown of proteins we  eat or proteins stored in our body, which may ultimately lead to muscle loss and affecting one’s metabolic rate or even malnutrition. However, our need is far below the current daily consumption.    Is sugar addictive? You be the .  When high doses of sugar are consumed, it stimulates the release of dopamine in our brains. This response makes us feel pleasure (now you know why when you feel down or depressed you may want to overindulge in sweets). The drug morphine, cocaine and sugar all stimulate the same brain receptors. This study has been proven many times in lab rat studies.    In his new and fascinating book, Salt Sugar Fat: How the Food Giants Hooked Us, Pulitzer Prize-winning  Ba Joe (I highly recommend this book) goes inside the world of processed and packaged foods. Heath writes in detail about how the food industry (which is 17 percent of our economy) contributes to American’s obesity epidemic by infusing processed foods with sugar, salt, and fat to make it more addictive and pleasurable. You see now why so many continue to buy their products?    According to the [facility] Food Policy and Obesity, the average child sees 5,500 food commercials a year that advertise high sugar breakfast cereals, fast food, soft drinks, candy and snacks. According to the Federal Trade Commission, the food industry spends $1.6 billion annually to reach children through the media, including the Internet.    What is high fructose corn syrup?  High fructose corn syrup (HFCS) is an industrial food product and not “natural” or a naturally occurring substance. It is extracted from corn stalks through a secret process. The sugars are extracted through a chemical enzymatic process resulting in HFCS.    Regular cane sugar (sucrose) is made of two-sugar molecules bound tightly together - glucose and fructose in equal amounts. The enzymes in your digestive tract must break down the sucrose into glucose and  fructose, which are then absorbed into the body.    HFCS also consists of glucose and fructose, not in a 50-50 ratio, but a 55-45 fructose to glucose ratio in an unbound form. Fructose is sweeter than glucose. And HCFS is cheaper than sugar. One of the reasons is because of the government farm bill corn subsidies. Products with HFCS are much sweeter and much cheaper than products made with cane sugar.    Sugar and HFCS’ Effect on the Body  Eating sugar has a systemic effect on your entire body including increased risk for diabetes, increased appetite, weight gain, heart and liver problems, decreased immune system, certain cancers and even your brain function to name a few.    Type 2 Diabetes  Type 1 Diabetes is when one’s pancreas does not make insulin. Type 2 Diabetes is when one’s body does not utilize insulin effectively. Type 1 Diabetes is usually diagnosed at a young age. Type 2 Diabetes used to occur in adulthood and was called “Adult Onset Diabetes,” however; it has since been renamed to Type 2 Diabetes because the onset is commonly seen at a much earlier age as the obesity epidemic increases. It has been estimated that just fewer than 2,000,000 individuals were diagnosed with Type 2 diabetes in 2010.    The pancreas acts on ingested sugar by secreting insulin. Insulin is a hormone that regulates the amount of sugar in the blood. If blood sugar gets too high or too low, it could be life-threatening. An increased amount of sugar in one’s diet causes the pancreas to secrete insulin. In some individuals, this leads to an overload on the pancreas and the development of Type 2 diabetes.    Sugar, Appetite & Weight Gain  Eating less sugar is linked with weight-loss, and eating more is linked with weight gain, according to a new review of published studies. The review lends support to the idea that advising people to limit the sugar in their diets may help lessen excess weight and obesity, the researchers conclude.  “The really interesting finding is that increasing and decreasing sugar had virtually identical results (on weight), in the opposite direction of course,” says researcher YULISSA Cedillo, PhD, professor of human nutrition and medicine at the [facility] in [place].    According to leading nutritional expert, Evens Sarabia MD, PhD, MPH, chair of nutrition at [employer] and author of Eat, Drink and Be Healthy, “Sugar increases body weight mainly by encouraging overeating.”    Heart and Liver Damage  A study published in the journal Hepatology in late 2012 found that consumption of fructose appears to affect the availability of the energy-transferring chemical ATP in the liver, thereby increasing the risk of liver cell malfunction and death.    In another review of HFCS, The American Journal of Clinical Nutrition, Juni Kuhn, PhD, Department of Nutrition, [facility] explains that HFCS is absorbed more rapidly than regular sugar, and that it doesn’t stimulate insulin or leptin production. This prevents you from triggering the body’s signals for being full and may lead to overconsumption of total calories.    A 2012 paper in the journal Nature, brought forward the idea that limitations and warnings should be placed on sugar similar to warnings we see on alcohol. The authors showed evidence that fructose and glucose in excess can have a toxic effect on the liver as the metabolism of ethanol (the alcohol contained in alcoholic beverages) had similarities to the metabolic pathways of fructose.    Another published study in the Journal of Nutrition in 2012, found that children who consumed high levels of fructose had lower blood levels of cardiovascular protective compounds, such as HDL cholesterol and adiponectin. Higher consumption of fructose led to higher levels of fat around the midsection, a significant risk factor for diabetes and cardiovascular  disease.    Immune System  Eliminating all sugar from a cancer patient’s diet would harm healthy cells that need energy to function. For example, many fruits contain high levels of antioxidants which are known to be effective in fighting cancer; however, sugars that come from whole fruits are low in sugar. Plant-based nutrition is a benefit to our overall health including fighting or preventing cancers. These important antioxidants, phytochemicals, fiber, vitamins and minerals are found in these plant-based whole foods.    However, diets high in sugar and refined carbohydrates can lead to overweight and obesity, which indirectly increases cancer risk throughout time. Certain cancers including breast, prostate, colorectal and pancreatic are associated with obesity.    How can you avoid these unhealthy consequences of (often hidden) sugar?  The best way to avoid sugar is to not consume obvious foods that are loaded with sugar. However, as discussed in this article, there are many packaged foods that surprisingly have added sugar and the more health-damaging high fructose corn syrup.  Therefore…    Eat nature’s foods  Avoid processed food (I call these factory foods, not real foods.)  Don’t eat foods in packages (or dramatically decrease consumption)  Eat foods that rot  Eat foods that walked the earth, flew in the imtiaz, swam in the ocean or grew in the soil     Beware!  Typically, the first ingredient on a label is the most prevalent in the food product; however, beware because there may be small amounts of many types of sugars, so none of them end up being in the first few ingredients (top 3) of the label. Sugar is disguised as a “healthy” ingredient, such as honey, rice syrup, or even “organic dehydrated cane juice.”    Here is a list of some of the possible “sugar” code words:    Corn sweetener  Corn syrup, or corn syrup solids  Dehydrated Cane Juice  Dextrin  Dextrose  Fructose  Fruit juice  concentrate  Glucose  High-fructose corn syrup  Honey  Invert sugar  Lactose  Maltodextrin  Malt syrup  Maltose  Maple syrup  Molasses  Raw sugar  Rice syrup  Saccharose  Sorghum or sorghum syrup  Sucrose  Syrup  Treacle  Turbinado sugar  Xylose    High Fructose Corn Sugar (HFCS) Quiz:    Which of the below listed foods contain High Fructose Corn Syrup?    Kraft Macaroni and Cheese  Stove Top Stuffing - Home style Herb  Shea Sun Iced Tea  Ocean Spray Cranberry Juice  Wild Cherry Lifesavers  Robitussan Cough and Congestion  Wonderbread - White Bread  Smuckers Grape Jelly  Leon’s Vegetable Soup  Mr & Mrs T Bloody Shanice Mix  Nabisco Fig Newtons - Whole Grain  Nabisco Fig Newtons - Fat Free  Wishbone Classic Caesars’ Dressing  Chicken of the Sea White Tuna, Spring Water    Answer: All    Additional Resources:    The Truth About Sugar - documentary on sugar free on Science Behind Sweat @ https://youStockpileu.be/9X3prrtUW0c  SUGAR: The Bitter Truth by Dr. Barry Colon (pediatric endocrinologist) - Lecture on sugar for free on  Utube @ https://youStockpileu.be/dBnniua6-oM?si=[email or web address]          Medication use and SEs reviewed with patient.    Return in about 5 months (around 4/18/2025) for weight management via clinic.    Patient verbalizes understanding.

## 2025-03-17 PROBLEM — D12.4 BENIGN NEOPLASM OF DESCENDING COLON: Status: ACTIVE | Noted: 2025-03-17

## 2025-03-17 PROBLEM — D12.0 BENIGN NEOPLASM OF CECUM: Status: ACTIVE | Noted: 2025-03-17

## 2025-03-17 PROBLEM — D12.3 BENIGN NEOPLASM OF TRANSVERSE COLON: Status: ACTIVE | Noted: 2025-03-17

## 2025-03-17 PROBLEM — D12.5 BENIGN NEOPLASM OF SIGMOID COLON: Status: ACTIVE | Noted: 2025-03-17

## 2025-03-26 DIAGNOSIS — Z51.81 ENCOUNTER FOR THERAPEUTIC DRUG MONITORING: ICD-10-CM

## 2025-03-26 DIAGNOSIS — R73.03 PRE-DIABETES: ICD-10-CM

## 2025-03-26 DIAGNOSIS — I10 ESSENTIAL HYPERTENSION: ICD-10-CM

## 2025-03-26 DIAGNOSIS — E78.2 MIXED HYPERLIPIDEMIA: ICD-10-CM

## 2025-03-26 NOTE — TELEPHONE ENCOUNTER
Requesting   Requested Prescriptions     Pending Prescriptions Disp Refills    JARDIANCE 10 MG Oral Tab [Pharmacy Med Name: JARDIANCE 10 MG TABLET] 90 tablet 1     Sig: TAKE 1 TABLET BY MOUTH EVERY DAY      LOV: 11/18/2024   RTC: 5 months  Filled: 09/30/2024 90 tablets #10 mg with 1 refills    Future Appointments   Date Time Provider Department Center   4/21/2025  8:40 AM Mari Romero APRN [id number]

## 2025-03-30 RX ORDER — EMPAGLIFLOZIN 10 MG/1
10 TABLET, FILM COATED ORAL DAILY
Qty: 90 TABLET | Refills: 1 | Status: SHIPPED | OUTPATIENT
Start: 2025-03-30

## 2025-04-02 ENCOUNTER — TELEMEDICINE (OUTPATIENT)
Dept: INTERNAL MEDICINE CLINIC | Facility: CLINIC | Age: 60
End: 2025-04-02
Payer: COMMERCIAL

## 2025-04-02 DIAGNOSIS — R73.03 PREDIABETES: ICD-10-CM

## 2025-04-02 DIAGNOSIS — Z51.81 ENCOUNTER FOR THERAPEUTIC DRUG MONITORING: Primary | ICD-10-CM

## 2025-04-02 DIAGNOSIS — Z86.39 HISTORY OF OBESITY: ICD-10-CM

## 2025-04-02 DIAGNOSIS — E66.3 OVERWEIGHT (BMI 25.0-29.9): ICD-10-CM

## 2025-04-02 DIAGNOSIS — E78.2 MIXED HYPERLIPIDEMIA: ICD-10-CM

## 2025-04-02 DIAGNOSIS — I10 ESSENTIAL HYPERTENSION: ICD-10-CM

## 2025-04-02 PROCEDURE — 98005 SYNCH AUDIO-VIDEO EST LOW 20: CPT | Performed by: NURSE PRACTITIONER

## 2025-04-02 RX ORDER — PHENTERMINE HYDROCHLORIDE 37.5 MG/1
37.5 TABLET ORAL EVERY MORNING
Qty: 30 TABLET | Refills: 2 | Status: SHIPPED | OUTPATIENT
Start: 2025-04-02

## 2025-04-02 RX ORDER — TOPIRAMATE 100 MG/1
100 TABLET, FILM COATED ORAL 2 TIMES DAILY
Qty: 180 TABLET | Refills: 1 | Status: SHIPPED | OUTPATIENT
Start: 2025-04-02

## 2025-04-02 NOTE — PROGRESS NOTES
[facility] Weight Management follow up via video visit:    Subjective    This visit is conducted using Telemedicine with live, interactive video and audio.    Chief Complaint:  Routine follow up visit for lifestyle and medical management for overweight, obesity, or morbid obesity. LOV in clinic weight: 208#.    PMH reviewed. Bariatric surgery planned or hx of surgery in the past: 0/10.    HPI:   You Guan Jr. is a 59 year old male who is being followed up today for lifestyle and medical management as deemed appropriate for overweight, obesity or morbid obesity. Patient reports weight loss monitoring at home via scale with a weight of 207#. This appears to be a weight maintenance since LOV 4.5 months ago in clinic. Patient has been consistent with medication.    Questions/Concerns/Comments since LOV: He will be retiring this July and anticipates will likely be in better health then with more time for himself. Had recent colonoscopy with multiple polyps and will need another sooner than later.    Lifestyle/Social Hx Reviewed:    [facility] Medical Weight Loss Follow Up    Question 4/2/2025  1:23 PM CDT - Filed by Patient   Please describe a success moment: Same   Please describe a challenging moment/needs for improvement: Same   Please complete this 24 hour food journal, listing everything you had to eat in the past day. Include the average time of day you ate these meals at    List foods, qty and prep for breakfast: Coffee   List foods, qty and prep for lunch. None   List foods, qty and prep for dinner. Whatever the wife makes   List foods, qty and prep for snacks. Fast ones   List the types and qty of fluids consumed Gatorade   On average, how many meals did you eat out per week? 2   Exercise    How many days per week are you active or exercise 7   On average, how many days were anaerobic (strength/resistance) exercises performed? 0   On average, how many days were aerobic (cardio) exercises performed? 0    Perceived level of exertion on a scale of 1-5, with 5 being very intense:    Stress    Average stress level on a scale of 1-10, with 10 being extremely stressed: 8   If greater than 5/1O how would you grade your coping mechanisms? moderate   Sleep hours and integrity    How many hours of uninterrupted sleep do you get a night: 6   Do you feel rested in the morning: Yes   If no, what may have been disrupting your sleep?    Please list any goal(s) for your next visit Continue       ROS  General: feeling well, denies fatigue  EYES: denies vision changes or high pain/pressure.  CV: denies cp, palpitations  Resp: denies sob  GI: denies abdominal pain. Denies N/V/D/C.  Neuro: denies paresthesia or cognitive changes  Psych: denies any mood changes    Physical Exam:  >   BP Readings from Last 3 Encounters:   11/18/24 140/80   07/01/24 148/84   05/06/24 126/70       Home weight: see above  Home BP: not available  Home blood sugars: n/a    General: patient speaking in full sentences, no increased work of breathing. alert, appears stated age, cooperative, and no distress  HENT: normocephalic  Resp: Breathing is non labored  Psych: patient appears cheerful, smiling, making good eye contact    Diagnoses and all orders for this visit:    Encounter for therapeutic drug monitoring  -     Phentermine HCl 37.5 MG Oral Tab; Take 1 tablet (37.5 mg total) by mouth every morning.  -     topiramate 100 MG Oral Tab; Take 1 tablet (100 mg total) by mouth 2 (two) times daily.    Overweight (BMI 25.0-29.9)  -     Phentermine HCl 37.5 MG Oral Tab; Take 1 tablet (37.5 mg total) by mouth every morning.  -     topiramate 100 MG Oral Tab; Take 1 tablet (100 mg total) by mouth 2 (two) times daily.    History of obesity  -     Phentermine HCl 37.5 MG Oral Tab; Take 1 tablet (37.5 mg total) by mouth every morning.  -     topiramate 100 MG Oral Tab; Take 1 tablet (100 mg total) by mouth 2 (two) times daily.    Essential hypertension    Mixed  hyperlipidemia    Prediabetes        Patient Instructions   Continue making lifestyle changes that focus on good nutrition, regular exercise and stress management.    Medication Plan: Continue current medication regimen. Will need in clinic visit for further Phentermine refills beyond current ones.    Agreed upon goal/s before next office visit include:  Tips to support further weight loss listed below. Recommend clean, lean and green eating for overall health.    Start and/or continue tracking nutrition to remain accountable for both quality and quantity of food. Recommend setting weekly weight loss goal to 1.5-2# and caution adding your exercise, as the paula may overestimate your daily calories. Use a food scale, measuring cups and spoons. Purchase serving dishes that are 1/4 cup, 1/2 cup and 1 cup servings. Use an paula such as Orthomimetics (www.mynetdiary.com), OpTier, or LoseIT! to provide accountably, structure and support. Consider a consult or follow up with one of our dieticians to help modify and/or support your nutrition plan for weight loss as well as maintenance.      Clean Eating: What is it Really About?  March 18, 2022  Posted in Blog, Nutrition  By [employer] [facility]    Many people focus on “clean eating.” In a world filled with potato chips, fast food burgers and cake, switching to clean eating can be a big change. Is it time for you to make it?    What is Clean Eating?  Eating clean has many variations and has gained popularity over the last several years. It focuses on choosing whole foods and minimally-processed foods and is more of a philosophy than a diet. The goal is to choose foods as close to their natural state as possible. Adding fruits, vegetables, meats, and whole grains is a great start. Processed foods such as chips, cookies and fats are eliminated.    Unlike other plans, clean eating isn’t specifically about portion sizes or numbers. Some find this way of eating to be  easier. There is limited research on eating clean; however, a clean diet is plant-based and low in saturated fat.    Tips for Eating Clean    Limit packaged processed foods. Some of this is obvious. It’s time to trade in the chips, cookies and snack cakes for other foods such as fruits, vegetables and nuts. Additionally, you might need to change how you prepare food. Swap boxed mashed potatoes for whole baked potatoes. Instead of bagged salad, chop your own salad from fresh vegetables.    Read the labels. With the focus on minimally-processed foods, the fewer the ingredients the better. Avoid added sugar, sweeteners and preservatives.  Find other ways to spice up your food. Fresh herbs can go further than any added preservative. Basil, cilantro or chives can spice up any meal.  Choose whole grains. White bread and refined grains should be limited. Instead, choose whole-grain bread, quinoa, brown rice and oats.    Hydrate with water. Water is your main source of fluid. For some variety and extra flavor, add a slice of lime or cucumber into your water. Sodas and sugary drinks should be eliminated. Herbal teas are a great option.  Dive into dairy. Milk, unsweetened yogurt and cheese can be great choices. Try to avoid sweetened milks or yogurts.  Pack the protein. Protein from beans, nuts and legumes are great. So are lean meats without fatty flavorings. Some clean eaters avoid meat from certain grocery stores or brands due to growth hormones and antibiotics. For those, choosing organic may be an option.    Take Things Slow  It can be a little overwhelming to begin a clean eating plan. Throwing away the contents of your cabinet may not be an option. Instead of taking away, focus on adding. Add more fruit and more vegetables to your day. You will find that by doing this, there is less room for processed foods. Small changes over time can add up. Get started today!      I recommend self monitoring to support behavior  change and to learn how your environment individually impacts YOUR body. This will help promote intuitive eating practices. Goal is to track nutrition 2x/week via paper log or using an paula such as Open Silicon (www.Pitadela.com) or LoseIT!. I also advise checking and logging a weekly home scale weight.      Self-Monitoring - The Way to Successful Weight Management    By Laura Kirk RD, ALYN, Yaz Kothari RD, LISSETT, Xavier Estrella PA-C, and Faustina Tinsley MD    OAC www.obesityaction.org Winter 2008 Resource    One major and possibly most important behavioral interventional strategy for weight management and lifestyle change is self-monitoring. Behavioral interventions are a central aspect in treatments to promote lifestyle changes that lead to weight-loss, prevent weight gain or weight regain and improve physical fitness. In the past, self-monitoring has unfortunately been one of the least popular techniques for those in weight management programs, and in some cases it is even thought of as a punishment. Because self-monitoring is critical for success with lifestyle changes, it is important to look at the various self monitoring techniques.    What is Self-monitoring?  Self-monitoring refers to the observing and recording of eating and exercise patterns, followed by feedback on the behaviors. The goal of self-monitoring is to increase self-awareness of target behaviors and outcomes, thus it can serve as an early warning system if problems are arising and can help track success. Some commonly used self-monitoring techniques include:    Food diaries  Regular self-weighing  Exercise logs  Equipment such as pedometers, accelerometers and metabolic devices  Food Logs and Diaries  One of the most common and important types of self-monitoring strategies in weight management programs is keeping a food log, in which individuals record foods, exercises or beverages as soon as they are consumed.    One important technique  with food logs is individuals recording what they eat or drink as it is consumed; otherwise it may not give an accurate account of the day’s intake. A good “rule of thumb” for food logs is: “if you bite it, you write it!”    The minimum information for weight-loss that should be kept in food logs is type, amount and caloric content of food or beverage consumed. This provides the ability to track and balance the number of calories consumed throughout the day with the amount of calories expended throughout the day.    Other nutritional information that can be logged includes: time of day of eating, fat content and carbohydrate grams. Disease-specific food logs can also be kept. For example: focusing on carbohydrate content instead of calories for patients with diabetes or insulin resistance.    Food Diaries  Another helpful tool in self-monitoring is keeping a food diary. Food diaries differ from food logs because they include more detailed information. They are useful if you are trying to find behavioral reasons or psychological aspects for eating.    Depending on the person and behavioral complexities involved, some food diaries could include the stress level, mood or feelings surrounding eating, activity or location or other environmental or emotional triggers for eating. The more complex or detailed, the better the feedback.    However, in today’s society it is almost impossible for most people to keep highly detailed daily food records over the long-term, therefore, compliance is often very low with detailed food diaries. By suggesting that patients keep a detailed food record for a few days each week, perhaps major areas of focus for nutritional and behavioral intervention can be recognized.    Logging Your Food Online  Online food logs and diaries or computer software are quick and convenient ways to keep records of foods consumed in our technologically advanced world. Many Web sites are available for tracking  of foods and calories throughout the day, some of which are free and very easy to use.    You can look up food choices and/or alternative choices in online databases of more than 50,000 foods. Internet-savvy loggers may choose to keep their journals online. Others may just choose to use these databases as a more convenient way of looking up nutritional value of foods. Some free online diaries include:    Free Web sites for searching nutritional information are available, an example is www.LegitTrader. These Web sites may also offer exercise tracking and ideas, support, motivational tips and chat or discussion rooms.    Hand-held Calorie Counters  Another option for those who are “on the go” are handheld devices for calorie counting. Some of the devices are stand-alone such as Cardinal Blue Softwaret® or Biorasisunter®. Others need to connect to Web sites. Other devices are installed in your Palm or Pocket-PC such as Diet Diary by "[employer]. They let you download updates when nutrition facts change, however, some of them use a lot of memory.    Regular Weighing  Weighing yourself is an important and simple self-monitoring behavior to serve as reminder of one’s eating and physical activity habits. Although it may be hard and sometimes discouraging to weigh yourself while losing weight, it is recommended to weigh yourself weekly, preferably outside of the home on the same scale.    Using the scale at the local gym or exercise facility or your doctor’s office may be more accurate than home scales. However, if this is unrealistic, it is okay to use a home scale. Try to weigh yourself at the same time of day and the same day of the week.    Writing down your weekly weights on a table, graph or calendar can help you keep track of your success or to help you get back on track more quickly. It is important to note that weighing yourself more frequently than weekly is not recommended, as day to day fluctuations are not  indicators of actual weight. Regular monitoring of your weight is also essential to help you maintain your weight after losing weight.    Exercise Logs  Another self-monitoring technique, along the same lines as food logs and diaries, is keeping an exercise log or diary. The number of minutes engaged and type and level of exertion of physical activity should ideally be recorded.    An important and often forgotten aspect of exercise logs is the level of perceived exertion. Walking for 30 minutes, at an easy compared to a hard pace, will result in different levels of calories burned and cardiovascular impact.    Typically, an easy physical activity that does not increase heart rate much, or alter breathing would usually be the pace that you walk around work or go shopping. Moderate level of physical exertion is when you are getting a mildly increased heart and breathing rate. Heavy or hard level of physical exertion would be sweating, increased heart rate (target heart rate range) as well as increased breathing.    Remember, physical activity can be done at one time or intermittently throughout the day. Logging exercise can be a positive feedback or a reminder to incorporate more exercise or physical activity into your daily routine.    Initial activities may be walking, riding a stationary bike or swimming at a slow pace. Other types of exercise that can be fun are dancing, exercise videos or chair exercises. You should try to aim for 30 minutes of exercise on most days of the week.    Many people try to start out with exercise on three or four days of the week. However, if you can get yourself exercising most to all days of the week, even if only for 10 or 15 minutes, it will become more of a routine for you.    Healthy Lifestyle Tip  All adults should set a long-term goal to accumulate at least 30 minutes or more of moderate-intensity physical activity on most to all days of the week. Also, try to increase  activities of daily living such as taking the stairs instead of the elevator, parking further away or walking to a bathroom that is further from your desk. Reducing sedentary time is a good strategy to increase activity by undertaking frequent, less strenuous activities. With time, you may be able to engage in more strenuous activities.    Pedometers  Self-monitoring tools are becoming more and more popular and accurate. One of the simplest of these self-monitoring tools is a pedometer. Pedometers give objective data of physical activity throughout the day. Pedometers can be found in almost any consumer [place] or retail store.    Some of the more popular manufactures include Digi-Walker, ReSnap, RocketBank, [employer], Media Retrievers, Macromill, Freestyle, KAI Square, Home Dialysis Plus and many others. Leixir and Hearsay.it make pedometer of speedometer devices that calculate steps and speed using GPS. These clip-on devices are inexpensive, ranging from less than $15 up to $75.    Many people get an average of 3,000 steps per day with daily activity. In order to burn off extra calories for weight-loss, walking 10,000 steps per day is recommended. For regular health, a minimum of 6,000 steps per day is required. Research suggests that a deliberate walk of 4,000-6,000 steps will help with weight-loss. It is often a good idea to keep track of your daily steps taken in your exercise log.    Pedometers can be frustrating for those who are more interested in distance traveled. Focusing on the number of steps and ways to incorporate more steps throughout the day will make as much of a difference with weight-loss as actual distance does. Pedometers encourage people to find ways to add more steps throughout the day.    Because step counting is becoming more popular, advances are being made in the technology behind pedometers. New pedometers display steps and count them accurately. They are meant to be worn everyday and all day, as  motivation to keep stepping, Most are small and comfortable to wear.    Pedometers sense your body motion, counting your footsteps usually by a turned pendulum technology, a coiled spring mechanism and a hairspring mechanism (which is the least accurate). The unit should be accurate in its count when you wear it correctly. You may need to experiment with where to wear it. You can measure your stride and then the pedometer can estimate distance traveled.    Some pedometers today offer multifunction options like calorie estimates, clocks, timers, stopwatches, speed estimators, seven-day memory or pulse rate readers, voice feedback and radios.    Accelerometers  Although pedometers are very cost-effective, one of the main flaws in using pedometers, however, is that they do not record intensity (how hard) or duration (how long) or frequency (how often) movement occurs. Accelerometers are devices that can objectively measure frequency, duration and intensity of physical activity.    Accelerometers provide a high level of accuracy when assessing physical activity. There are a variety of commercially available accelerometers, or activity monitors, which come in a wide range of prices anywhere from $50 to $1,000. Razumerainer and Nike are examples of affordable accelerometers.    Many of the more expensive accelerometers are used only in research or as a part of a hospital-based program. These monitors are more complex than pedometers in that they display and store more complex data. Some are designed to download to a computer for analysis of intensity levels, movements and physical activity patterns. They can also be used to estimate calories burned or energy expenditure.    Accelerometers have sophisticated sensors that convert physical movement into an electrical signal that is relative to the muscular force needed to produce the work. Accelerometers can be found in uniaxial or triaxial measures. Uniaxial accelerometers  measure in a single plane and can be attached to the trunk or limbs. Triaxial accelerometers measure along three planes: vertical, medial-lateral and anterior-posterior.    Although accelerometers are a step up from pedometers in accuracy of physical activity, they cannot register resistance. Therefore, if you are strength training or adding resistance to your bike or treadmill or adding an incline to your walking, it will not be able to discern the added level of energy required to do that work.    Metabolic Devices  One of the most accurate and most expensive tools for self-monitoring are tools that have very sophisticated monitoring and interpreting sensors for calories burned. Many of these devices have options to subscribe to a Web-based calorie counter system that integrates the amount of calories burned measured by the equipment and your calories consumed that you enter in easy to use food logs.    These devices are more accurate in measurements of calories expended because they use not only accelerometer technology, but also heat flux sensors, galvanic skin response (to measure physical exertion and emotional stimuli) and skin temperature gauges. Some also include heart rate monitoring techniques. All of these technologies combined lead to a very accurate measurement of calories expended throughout the day.    These devices can determine if you are sitting, sleeping, jogging, walking, lifting weights or riding in a car. Many of these devices are very expensive and used primarily for research, however, some are available commercially.    This technology is also employed by hospital-based programs. Patients wear the hospital’s armband and track their nutrition on the Web site or computer-based program for typically one to two weeks. When they return to the clinic, the information will be uploaded and the practitioners will be able to work with the patients with objective data on metabolic lifestyle  patterns.    Practitioners can also monitor patients on integrated software applications to provide consultations without being face to face. Practitioners have the ability to set daily goals to tailor programs to the individual patient. These are great tools to help objectively monitor behavior and physical activity, as well as providing real time feed back to the patient. [employer] is one company that offers this technology.    Conclusion  Although specific diseases and treatments vary, behavior modification is the major key in weight-loss or prevention and decreases the risk of diseases. Self-monitoring is a key to behavior modifications, and there are a multitude of ways to self-monitor. With technology advancements, self-monitoring techniques are changing and improving to help defeat some of the major barriers to compliance. The bottom line is that no matter how you do it, self-monitoring should be an important part of your weight-loss, weight maintenance or healthy lifestyle change. Then, the next step is to be sure the self-monitoring translates into positive behavior changes with regards to diet and exercise.      Return in about 3 months (around 7/2/2025) for weight management via clinic only.    DOCUMENTATION OF TIME SPENT: Code selection for this visit was based on time spent : 20 minutes on date of service in preparing to see the patient, obtaining and/or reviewing separately obtained history, performing a medically appropriate examination, counseling and educating the patient/family/caregiver, ordering medications or testing, referring and communicating with other healthcare providers, documenting clinical information in the electronic medical record, independently interpreting results and communicating results to the patient/family/caregiver and care coordination with the patient's other providers.  Answers submitted by the patient for this visit:  Medical Weight Loss Follow Up (Submitted on  4/2/2025)  If greater than 5/1O how would you grade your coping mechanisms?: moderate

## 2025-04-02 NOTE — PATIENT INSTRUCTIONS
Continue making lifestyle changes that focus on good nutrition, regular exercise and stress management.    Medication Plan: Continue current medication regimen. Will need in clinic visit for further Phentermine refills beyond current ones.    Agreed upon goal/s before next office visit include:  Tips to support further weight loss listed below. Recommend clean, lean and green eating for overall health.    Start and/or continue tracking nutrition to remain accountable for both quality and quantity of food. Recommend setting weekly weight loss goal to 1.5-2# and caution adding your exercise, as the paula may overestimate your daily calories. Use a food scale, measuring cups and spoons. Purchase serving dishes that are 1/4 cup, 1/2 cup and 1 cup servings. Use an paula such as [employer] (www.mynetdiary.com), [employer], or LoseIT! to provide accountably, structure and support. Consider a consult or follow up with one of our dieticians to help modify and/or support your nutrition plan for weight loss as well as maintenance.      Clean Eating: What is it Really About?  March 18, 2022  Posted in Blog, Nutrition  By [employer]    Many people focus on “clean eating.” In a world filled with potato chips, fast food burgers and cake, switching to clean eating can be a big change. Is it time for you to make it?    What is Clean Eating?  Eating clean has many variations and has gained popularity over the last several years. It focuses on choosing whole foods and minimally-processed foods and is more of a philosophy than a diet. The goal is to choose foods as close to their natural state as possible. Adding fruits, vegetables, meats, and whole grains is a great start. Processed foods such as chips, cookies and fats are eliminated.    Unlike other plans, clean eating isn’t specifically about portion sizes or numbers. Some find this way of eating to be easier. There is limited research on eating clean; however, a  clean diet is plant-based and low in saturated fat.    Tips for Eating Clean    Limit packaged processed foods. Some of this is obvious. It’s time to trade in the chips, cookies and snack cakes for other foods such as fruits, vegetables and nuts. Additionally, you might need to change how you prepare food. Swap boxed mashed potatoes for whole baked potatoes. Instead of bagged salad, chop your own salad from fresh vegetables.    Read the labels. With the focus on minimally-processed foods, the fewer the ingredients the better. Avoid added sugar, sweeteners and preservatives.  Find other ways to spice up your food. Fresh herbs can go further than any added preservative. Basil, cilantro or chives can spice up any meal.  Choose whole grains. White bread and refined grains should be limited. Instead, choose whole-grain bread, quinoa, brown rice and oats.    Hydrate with water. Water is your main source of fluid. For some variety and extra flavor, add a slice of lime or cucumber into your water. Sodas and sugary drinks should be eliminated. Herbal teas are a great option.  Dive into dairy. Milk, unsweetened yogurt and cheese can be great choices. Try to avoid sweetened milks or yogurts.  Pack the protein. Protein from beans, nuts and legumes are great. So are lean meats without fatty flavorings. Some clean eaters avoid meat from certain grocery stores or brands due to growth hormones and antibiotics. For those, choosing organic may be an option.    Take Things Slow  It can be a little overwhelming to begin a clean eating plan. Throwing away the contents of your cabinet may not be an option. Instead of taking away, focus on adding. Add more fruit and more vegetables to your day. You will find that by doing this, there is less room for processed foods. Small changes over time can add up. Get started today!      I recommend self monitoring to support behavior change and to learn how your environment individually impacts YOUR  body. This will help promote intuitive eating practices. Goal is to track nutrition 2x/week via paper log or using an paula such as Wedit (www.VentureH[employer].com) or LoseIT!. I also advise checking and logging a weekly home scale weight.      Self-Monitoring - The Way to Successful Weight Management    By Laura Kirk RD, LDN, Yaz Kothari RD, LISSETT, Xavier Estrella PA-C, and Faustina Tinsley MD    OAC www.obesityaction.org Winter 2008 Resource    One major and possibly most important behavioral interventional strategy for weight management and lifestyle change is self-monitoring. Behavioral interventions are a central aspect in treatments to promote lifestyle changes that lead to weight-loss, prevent weight gain or weight regain and improve physical fitness. In the past, self-monitoring has unfortunately been one of the least popular techniques for those in weight management programs, and in some cases it is even thought of as a punishment. Because self-monitoring is critical for success with lifestyle changes, it is important to look at the various self monitoring techniques.    What is Self-monitoring?  Self-monitoring refers to the observing and recording of eating and exercise patterns, followed by feedback on the behaviors. The goal of self-monitoring is to increase self-awareness of target behaviors and outcomes, thus it can serve as an early warning system if problems are arising and can help track success. Some commonly used self-monitoring techniques include:    Food diaries  Regular self-weighing  Exercise logs  Equipment such as pedometers, accelerometers and metabolic devices  Food Logs and Diaries  One of the most common and important types of self-monitoring strategies in weight management programs is keeping a food log, in which individuals record foods, exercises or beverages as soon as they are consumed.    One important technique with food logs is individuals recording what they eat or drink as it  is consumed; otherwise it may not give an accurate account of the day’s intake. A good “rule of thumb” for food logs is: “if you bite it, you write it!”    The minimum information for weight-loss that should be kept in food logs is type, amount and caloric content of food or beverage consumed. This provides the ability to track and balance the number of calories consumed throughout the day with the amount of calories expended throughout the day.    Other nutritional information that can be logged includes: time of day of eating, fat content and carbohydrate grams. Disease-specific food logs can also be kept. For example: focusing on carbohydrate content instead of calories for patients with diabetes or insulin resistance.    Food Diaries  Another helpful tool in self-monitoring is keeping a food diary. Food diaries differ from food logs because they include more detailed information. They are useful if you are trying to find behavioral reasons or psychological aspects for eating.    Depending on the person and behavioral complexities involved, some food diaries could include the stress level, mood or feelings surrounding eating, activity or location or other environmental or emotional triggers for eating. The more complex or detailed, the better the feedback.    However, in today’s society it is almost impossible for most people to keep highly detailed daily food records over the long-term, therefore, compliance is often very low with detailed food diaries. By suggesting that patients keep a detailed food record for a few days each week, perhaps major areas of focus for nutritional and behavioral intervention can be recognized.    Logging Your Food Online  Online food logs and diaries or computer software are quick and convenient ways to keep records of foods consumed in our technologically advanced world. Many Web sites are available for tracking of foods and calories throughout the day, some of which are free and  very easy to use.    You can look up food choices and/or alternative choices in online databases of more than 50,000 foods. Internet-savvy loggers may choose to keep their journals online. Others may just choose to use these databases as a more convenient way of looking up nutritional value of foods. Some free online diaries include:    Free Web sites for searching nutritional information are available, an example is www.be2. These Web sites may also offer exercise tracking and ideas, support, motivational tips and chat or discussion rooms.    Hand-held Calorie Counters  Another option for those who are “on the go” are handheld devices for calorie counting. Some of the devices are stand-alone such as CalorieSmart® or SocialtyzeCounter®. Others need to connect to Web sites. Other devices are installed in your Palm or Pocket-PC such as Diet Diary by [employer] ShareYourCart. They let you download updates when nutrition facts change, however, some of them use a lot of memory.    Regular Weighing  Weighing yourself is an important and simple self-monitoring behavior to serve as reminder of one’s eating and physical activity habits. Although it may be hard and sometimes discouraging to weigh yourself while losing weight, it is recommended to weigh yourself weekly, preferably outside of the home on the same scale.    Using the scale at the local gym or exercise facility or your doctor’s office may be more accurate than home scales. However, if this is unrealistic, it is okay to use a home scale. Try to weigh yourself at the same time of day and the same day of the week.    Writing down your weekly weights on a table, graph or calendar can help you keep track of your success or to help you get back on track more quickly. It is important to note that weighing yourself more frequently than weekly is not recommended, as day to day fluctuations are not indicators of actual weight. Regular monitoring of your weight is also  essential to help you maintain your weight after losing weight.    Exercise Logs  Another self-monitoring technique, along the same lines as food logs and diaries, is keeping an exercise log or diary. The number of minutes engaged and type and level of exertion of physical activity should ideally be recorded.    An important and often forgotten aspect of exercise logs is the level of perceived exertion. Walking for 30 minutes, at an easy compared to a hard pace, will result in different levels of calories burned and cardiovascular impact.    Typically, an easy physical activity that does not increase heart rate much, or alter breathing would usually be the pace that you walk around work or go shopping. Moderate level of physical exertion is when you are getting a mildly increased heart and breathing rate. Heavy or hard level of physical exertion would be sweating, increased heart rate (target heart rate range) as well as increased breathing.    Remember, physical activity can be done at one time or intermittently throughout the day. Logging exercise can be a positive feedback or a reminder to incorporate more exercise or physical activity into your daily routine.    Initial activities may be walking, riding a stationary bike or swimming at a slow pace. Other types of exercise that can be fun are dancing, exercise videos or chair exercises. You should try to aim for 30 minutes of exercise on most days of the week.    Many people try to start out with exercise on three or four days of the week. However, if you can get yourself exercising most to all days of the week, even if only for 10 or 15 minutes, it will become more of a routine for you.    Healthy Lifestyle Tip  All adults should set a long-term goal to accumulate at least 30 minutes or more of moderate-intensity physical activity on most to all days of the week. Also, try to increase activities of daily living such as taking the stairs instead of the elevator,  parking further away or walking to a bathroom that is further from your desk. Reducing sedentary time is a good strategy to increase activity by undertaking frequent, less strenuous activities. With time, you may be able to engage in more strenuous activities.    Pedometers  Self-monitoring tools are becoming more and more popular and accurate. One of the simplest of these self-monitoring tools is a pedometer. Pedometers give objective data of physical activity throughout the day. Pedometers can be found in almost any consumer [employer] or retail store.    Some of the more popular manufactures include Digi-Walker, Stepsss, Blue Badge Style, [employer], SureDone, Exploredge, Freestyle, [employer], [employer] and many others. [employer] and [employer] make pedometer of speedometer devices that calculate steps and speed using GPS. These clip-on devices are inexpensive, ranging from less than $15 up to $75.    Many people get an average of 3,000 steps per day with daily activity. In order to burn off extra calories for weight-loss, walking 10,000 steps per day is recommended. For regular health, a minimum of 6,000 steps per day is required. Research suggests that a deliberate walk of 4,000-6,000 steps will help with weight-loss. It is often a good idea to keep track of your daily steps taken in your exercise log.    Pedometers can be frustrating for those who are more interested in distance traveled. Focusing on the number of steps and ways to incorporate more steps throughout the day will make as much of a difference with weight-loss as actual distance does. Pedometers encourage people to find ways to add more steps throughout the day.    Because step counting is becoming more popular, advances are being made in the technology behind pedometers. New pedometers display steps and count them accurately. They are meant to be worn everyday and all day, as motivation to keep stepping, Most are small and comfortable to wear.    Pedometers  sense your body motion, counting your footsteps usually by a turned pendulum technology, a coiled spring mechanism and a hairspring mechanism (which is the least accurate). The unit should be accurate in its count when you wear it correctly. You may need to experiment with where to wear it. You can measure your stride and then the pedometer can estimate distance traveled.    Some pedometers today offer multifunction options like calorie estimates, clocks, timers, stopwatches, speed estimators, seven-day memory or pulse rate readers, voice feedback and radios.    Accelerometers  Although pedometers are very cost-effective, one of the main flaws in using pedometers, however, is that they do not record intensity (how hard) or duration (how long) or frequency (how often) movement occurs. Accelerometers are devices that can objectively measure frequency, duration and intensity of physical activity.    Accelerometers provide a high level of accuracy when assessing physical activity. There are a variety of commercially available accelerometers, or activity monitors, which come in a wide range of prices anywhere from $50 to $1,000. [employer] and [employer] are examples of affordable accelerometers.    Many of the more expensive accelerometers are used only in research or as a part of a hospital-based program. These monitors are more complex than pedometers in that they display and store more complex data. Some are designed to download to a computer for analysis of intensity levels, movements and physical activity patterns. They can also be used to estimate calories burned or energy expenditure.    Accelerometers have sophisticated sensors that convert physical movement into an electrical signal that is relative to the muscular force needed to produce the work. Accelerometers can be found in uniaxial or triaxial measures. Uniaxial accelerometers measure in a single plane and can be attached to the trunk or limbs. Triaxial  accelerometers measure along three planes: vertical, medial-lateral and anterior-posterior.    Although accelerometers are a step up from pedometers in accuracy of physical activity, they cannot register resistance. Therefore, if you are strength training or adding resistance to your bike or treadmill or adding an incline to your walking, it will not be able to discern the added level of energy required to do that work.    Metabolic Devices  One of the most accurate and most expensive tools for self-monitoring are tools that have very sophisticated monitoring and interpreting sensors for calories burned. Many of these devices have options to subscribe to a Web-based calorie counter system that integrates the amount of calories burned measured by the equipment and your calories consumed that you enter in easy to use food logs.    These devices are more accurate in measurements of calories expended because they use not only accelerometer technology, but also heat flux sensors, galvanic skin response (to measure physical exertion and emotional stimuli) and skin temperature gauges. Some also include heart rate monitoring techniques. All of these technologies combined lead to a very accurate measurement of calories expended throughout the day.    These devices can determine if you are sitting, sleeping, jogging, walking, lifting weights or riding in a car. Many of these devices are very expensive and used primarily for research, however, some are available commercially.    This technology is also employed by hospital-based programs. Patients wear the hospital’s armband and track their nutrition on the Web site or computer-based program for typically one to two weeks. When they return to the clinic, the information will be uploaded and the practitioners will be able to work with the patients with objective data on metabolic lifestyle patterns.    Practitioners can also monitor patients on integrated software applications  to provide consultations without being face to face. Practitioners have the ability to set daily goals to tailor programs to the individual patient. These are great tools to help objectively monitor behavior and physical activity, as well as providing real time feed back to the patient. [employer] is one company that offers this technology.    Conclusion  Although specific diseases and treatments vary, behavior modification is the major key in weight-loss or prevention and decreases the risk of diseases. Self-monitoring is a key to behavior modifications, and there are a multitude of ways to self-monitor. With technology advancements, self-monitoring techniques are changing and improving to help defeat some of the major barriers to compliance. The bottom line is that no matter how you do it, self-monitoring should be an important part of your weight-loss, weight maintenance or healthy lifestyle change. Then, the next step is to be sure the self-monitoring translates into positive behavior changes with regards to diet and exercise.

## 2025-05-09 DIAGNOSIS — E78.5 DYSLIPIDEMIA: ICD-10-CM

## 2025-05-09 NOTE — TELEPHONE ENCOUNTER
Requesting Atorvastatin 40mg  Filled: 5/6/24 #90 with 3 refills  Cholesterol Medication Protocol [id number]/09/2025 12:03 AM   Protocol Details   In person appointment or virtual visit in the past 12 mos or appointment in next 3 mos    ALT < 80    ALT resulted within past year    Lipid panel within past 12 months    Medication is active on med list     Requesting Fluoxetine  40mg  Filled: 5/6/24 #90 with 3 refills  Psychiatric Non-Scheduled (Anti-Anxiety) Pecixy3305/09/2025 12:03 AM   Protocol Details   In person appointment or virtual visit in the past 6 mos or appointment in next 3 mos    Depression Screening completed within the past 12 months    Medication is active on med list     LOV: 5/6/24 Physical  RTC: 1 year  Last Relevant Labs: 6/3/24    Future Appointments   Date Time Provider Department Center   9/29/2025  7:30 AM Rioc Sheffield MD [facility] SUB GI     Patient hasn't been seen in over a year, he is due for annual physical. Please schedule.  I can send refill once appointment is scheduled.  Thanks!

## 2025-05-12 RX ORDER — FLUOXETINE HYDROCHLORIDE 40 MG/1
40 CAPSULE ORAL DAILY
Qty: 90 CAPSULE | Refills: 0 | Status: SHIPPED | OUTPATIENT
Start: 2025-05-12

## 2025-05-12 RX ORDER — ATORVASTATIN CALCIUM 40 MG/1
40 TABLET, FILM COATED ORAL NIGHTLY
Qty: 90 TABLET | Refills: 0 | Status: SHIPPED | OUTPATIENT
Start: 2025-05-12

## 2025-05-12 NOTE — TELEPHONE ENCOUNTER
Future Appointments   Date Time Provider Department Center   5/13/2025  3:30 PM Aleks Cárdenas MD [facility] [address] [facility] [address]   9/29/2025  7:30 AM Rico Sheffield MD [facility] SUB GI

## 2025-05-13 ENCOUNTER — OFFICE VISIT (OUTPATIENT)
Dept: FAMILY MEDICINE CLINIC | Facility: CLINIC | Age: 60
End: 2025-05-13
Payer: COMMERCIAL

## 2025-05-13 VITALS
HEIGHT: 70 IN | WEIGHT: 213 LBS | TEMPERATURE: 98 F | OXYGEN SATURATION: 98 % | SYSTOLIC BLOOD PRESSURE: 130 MMHG | BODY MASS INDEX: 30.49 KG/M2 | HEART RATE: 62 BPM | DIASTOLIC BLOOD PRESSURE: 78 MMHG | RESPIRATION RATE: 16 BRPM

## 2025-05-13 DIAGNOSIS — Z00.00 WELLNESS EXAMINATION: Primary | ICD-10-CM

## 2025-05-13 DIAGNOSIS — Z12.5 SCREENING FOR MALIGNANT NEOPLASM OF PROSTATE: ICD-10-CM

## 2025-05-13 PROCEDURE — 99396 PREV VISIT EST AGE 40-64: CPT | Performed by: FAMILY MEDICINE

## 2025-05-13 PROCEDURE — 3075F SYST BP GE 130 - 139MM HG: CPT | Performed by: FAMILY MEDICINE

## 2025-05-13 PROCEDURE — 3008F BODY MASS INDEX DOCD: CPT | Performed by: FAMILY MEDICINE

## 2025-05-13 PROCEDURE — 3078F DIAST BP <80 MM HG: CPT | Performed by: FAMILY MEDICINE

## 2025-05-13 NOTE — PATIENT INSTRUCTIONS
Thank you for choosing Aleks Cárdenas MD at [facility]  To Do: You Guan Jr.  1. Please see age appropriate health prevention below     Call [phone] to schedule the appointment.   Please signup for Responde Ai, which is electronic access to your record if you have not done so.  All your results will post on there.  [email or web address]   You can NOW use Responde Ai to book your appointments with us, or consider using open access scheduling which is through the [employer] website [email or web address] and type in Aleks Cárdenas MD and follow the links for \"Schedule Online Now\"    To schedule Imaging or tests at [place] call Central Scheduling [phone], Go to [facility] ER Building (For example: CT scans, X rays, Ultrasound, MRI)  Cardiac Testing in ER building Building A second floor Cardiac Testing [phone] (For example: Holter Monitor, Cardiac Stress tests,Event Monitor, or 2D Echocardiograms)  [facility] call [phone] usually in [facility]  Walk in Clinic in [place] at [address] Mon-Fri at 8am-7:30 p.m., and Sat/Sun 9:00a.m.-4:30 p.m.  Also at [address] [place] [address]  Call [phone] for info     Please call our office about any questions regarding your treatment/medicines/tests as a result of today's visit.  For your safety, read the entire package insert of all medicines prescribed to you and be aware of all of the risks of treatment even beyond those discussed today.  All therapies have potential risk of harm or side effects or medication interactions.  It is your duty and for your safety to discuss with the pharmacist and our office with questions, and to notify us and stop treatment if problems arise, but know that our intention is that the benefits outweigh those potential risks and we strive to make you healthier and to improve your quality of life.    Referrals, and Radiology Information:    If your insurance requires a referral to a specialist,  please allow 5 business days to process your referral request.    If Aleks Cárdenas MD orders a CT or MRI, it may take up to 10 business days to receive approval from your insurance company. Once our office has called informing you that the insurance company approved your testing, please call Central Scheduling at [phone]  Please allow our office 5 business days to contact you regarding any testing results.    Refill policies:   Allow 3 business days for refills; controlled substances may take longer and must be picked up from the office in person.  Narcotic medications can only be filled in 30 day increments and must be refilled at an office visit only.  If your prescription is due for a refill, you may be due for a follow-up appointment.  We cannot refill your maintenance medications at a preventative wellness visit.  To best provide you care, patients receiving maintenance medications need to be seen at least twice a year.

## 2025-05-13 NOTE — PROGRESS NOTES
Wellness Exam    REASON FOR VISIT:    You Guan Jr. is a 59 year old male who presents for an Annual Health Assessment.    Current Complaints: Mr. Roge Yeboah is here for his wellness exam  Flu Shot: see immunization record  Health Maintenance Topics with due status: Overdue       Topic Date Due    Pneumococcal Vaccine: 50+ Years 06/19/2015    Zoster Vaccines Never done    Asthma Control Test 09/12/2018    DTaP,Tdap,and Td Vaccines 06/19/2024    COVID-19 Vaccine 09/01/2024    HTN: BP Follow-Up 12/18/2024    Annual Depression Screening 01/01/2025    Tobacco Cessation Counseling Never done    Annual Physical 05/06/2025     Health Maintenance Topics with due status: Due Soon       Topic Date Due    Colorectal Cancer Screening 09/17/2025     Reported Health:   Mr. Guan is a pleasant 59-year-old gentleman with history of hypertension, hyperlipidemia, asthma, bladder cancer, depression, anxiety, ulcerative colitis, tobacco dependence, h/o alcohol dependence here for his wellness exam.  He shares to me that he will be retiring in the next few months and looking forward to it.  He is focusing to improve his health.  Otherwise he does not have any concerns or complaints      I had reviewed past medical and family histories together with allergy and medication lists documented.    Details about the complaints:  N/A    General Health     How would you describe your current health state?: Good    Type of Diet: Balanced    How do you maintain positive mental well-being?: Social Interaction    How would you describe your daily physical activity?: Moderate    If you are a male age 45-79 or a female age 55-79, do you take aspirin?: No    Have you had any immunizations at another office such as Influenza, Hepatitis B, Tetanus, or Pneumococcal?: No    At any time do you feel concerned for the safety/well-being of yourself and/or your children, in your home or elsewhere?: No     CAGE:     Cut: Have you ever felt you should Cut  down on your drinking?: No    Annoyed: Have people Annoyed you by criticizing your drinking?: No    Guilty: Have you ever felt bad or Guilty about your drinking?: No    Eye Opener: Have you ever had a drink first thing in the morning to steady your nerves or to get rid of a hangover (Eye opener)?: No    Scoring  Total Score: 0     PHQ-4: Over the LAST 2 WEEKS       Depression Screening (PHQ-2/PHQ-9): Over the LAST 2 WEEKS   Little interest or pleasure in doing things (over the last two weeks)?: Not at all    Feeling down, depressed, or hopeless (over the last two weeks)?: Not at all    PHQ-2 SCORE: 0              PREVENTATIVE SERVICES  INDICATIONS AND SCHEDULE Recommendation Internal Lab or Procedure External Lab or Procedure   Colonoscopy Screen Every 10 years Health Maintenance   Topic Date Due    Colorectal Cancer Screening  09/17/2025       Flex Sigmoidoscopy Screen  Every 5 years No results found for this or any previous visit.    Fecal Occult Blood  Annually Occult Blood Result (no units)   Date Value   05/31/2018 Negative for Occult Blood       Obesity Screening Screen all adults annually Body mass index is 30.56 kg/m².      Preventive Services for Which Recommendations Vary with Risk Recommendation Internal Lab or Procedure External Lab or Procedure   Cholesterol Screening Recommended screening varies with age, risk and gender LDL Cholesterol (mg/dL)   Date Value   06/03/2024 152 (H)     LDL-CHOLESTEROL (mg/dL)   Date Value   04/12/2014 91     LDL CHOLESTROL (mg/dL)   Date Value   07/12/2014 84       Diabetes Screening  If history of high blood pressure or other  risk factors HEMOGLOBIN A1c (%)   Date Value   04/12/2014 6.1 (H)     HgbA1C (%)   Date Value   06/03/2024 5.8 (H)     Glucose (mg/dL)   Date Value   06/03/2024 94     GLUCOSE (mg/dL)   Date Value   04/07/2014 107 (H)         Gonorrhea Screening If high risk No results found for: \"GONOCOCCUS\"    HIV Screening For all adults age 18-65, older adults  at increased risk No results found for: \"HIV\"    Syphilis Screening Screen if pregnant or high risk No results found for: \"RPR\"    Hepatitis C Screening Screen those at high risk plus screen one time for adults born 1945-1 965 No results found for: \"HCVAB\"    Tuberculosis Screen If high risk No components found for: \"PPDINDURAT\"      ALLERGIES:   Allergies[1]    CURRENT MEDICATIONS:   Current Medications[2]   MEDICAL INFORMATION:   Past Medical History[3]   Past Surgical History[4]   Family History[5]   SOCIAL HISTORY:   Short Social Hx on File[6]       REVIEW OF SYSTEMS:   Constitutional: Negative for fever, chills and fatigue.   HENT: Negative for hearing loss, congestion, sore throat and neck pain.    Eyes: Negative for pain and visual disturbance.   Respiratory: Negative for cough and shortness of breath.    Cardiovascular: Negative for chest pain and palpitations.   Gastrointestinal: Negative for nausea, vomiting, abdominal pain and diarrhea.   Genitourinary: Negative for urgency, frequency and difficulty urinating.   Musculoskeletal: Negative for arthralgias and no gait problem.   Skin: Negative for color change and rash.   Neurological: Negative for tremors, weakness and numbness.   Hematological: Negative for adenopathy. Does not bruise/bleed easily.   Psychiatric/Behavioral: Negative for confusion and agitation. The patient is not nervous/anxious.      EXAM:   /78   Pulse 62   Temp 97.8 °F (36.6 °C) (Temporal)   Resp 16   Ht 5' 10\" (1.778 m)   Wt 213 lb (96.6 kg)   SpO2 98%   BMI 30.56 kg/m²   Constitutional: He appears well-developed, nourished, and his stated age. Vital signs reviewed  Pleasant man   Head: Normocephalic and atraumatic.   Ear: TMs visible and normal bilaterally  Nose: Nose normal.   Eyes: EOM are normal. Pupils are equal, round, and reactive to light. No scleral icterus.   Neck: Normal range of motion. No thyromegaly present.   Cardiovascular: Normal rate, regular rhythm and  normal heart sounds.  Exam reveals no friction rub.    No murmur heard.  Pulmonary/Chest: Effort normal and breath sounds normal. He has no wheezes. He has no rales.   Abdominal: Soft. Bowel sounds are normal. There is no tenderness.   Musculoskeletal: Normal range of motion. He exhibits no edema.   Lymphadenopathy:     He has no cervical adenopathy.   Neurological: He is alert and oriented to person, place, and time. He has normal reflexes.   Skin: Skin is warm. No rash noted. No erythema. with normal hair  Psychiatric: He has a normal mood and affect. His behavior is normal.     ASSESSMENT AND OTHER RELEVANT CHRONIC CONDITIONS:   You Guan Jr. is a 59 year old male who presents for an Annual Health Assessment.   1. Wellness examination    2. Screening for malignant neoplasm of prostate        PLAN SUMMARY:   You Guan Jr. is a 59 year old male  Age appropriate cancer screening, labs, safety, immunizations were discussed with the patient and ordered as follows:  Diagnoses and all orders for this visit:    Wellness examination  -     CBC W Differential W Platelet [E]; Future  -     Comp Metabolic Panel (14) [E]; Future  -     Lipid Panel [E]; Future  -     TSH and Free T4 [E]; Future  -     Hemoglobin A1C [E]; Future    Screening for malignant neoplasm of prostate  -     PSA, Total (Screening) [E]; Future    Overall I do not have any concerns with his health.  Continue follow-up with his specialist.  We shall check routine labs and will notify him once we get test results.  He will be having repeat colonoscopy in the next few weeks under his gastroenterologist.  Otherwise follow-up in 1 year as needed    This note was prepared using Dragon [employer] voice recognition dictation software. As a result errors may occur. When identified these errors have been corrected. While every attempt is made to correct errors during dictation discrepancies may still exist            Orders Placed This Encounter    Procedures    CBC W Differential W Platelet [E]    Comp Metabolic Panel (14) [E]    Lipid Panel [E]    TSH and Free T4 [E]    Hemoglobin A1C [E]    PSA, Total (Screening) [E]       Imaging & Consults:  None    His 5 year prevention plan includes: annual visits for fasting labs  Health Maintenance   Topic Date Due    Pneumococcal Vaccine: 50+ Years (2 of 2 - PCV) 06/19/2015    Zoster Vaccines (1 of 2) Never done    Asthma Control Test  09/12/2018    DTaP,Tdap,and Td Vaccines (2 - Td or Tdap) 06/19/2024    COVID-19 Vaccine (4 - 2024-25 season) 09/01/2024    HTN: BP Follow-Up  12/18/2024    Annual Depression Screening  01/01/2025    Tobacco Cessation Counseling  Never done    Annual Physical  05/06/2025    Colorectal Cancer Screening  09/17/2025    Influenza Vaccine (Season Ended) 10/01/2025    PSA  06/03/2026    Meningococcal B Vaccine  Aged Out     Patient/Caregiver Education:  Patient/Caregiver Education: There are no barriers to learning. Medical education done.  Outcome: Patient verbalizes understanding.    Educated by: MD   The patient indicates understanding of these issues and agrees to the plan.    SUGGESTED VACCINATIONS - Influenza, Pneumococcal, Zoster, Tetanus     Immunization History   Administered Date(s) Administered    Covid-19 Vaccine Pfizer 30 mcg/0.3 ml 04/25/2021, 05/20/2021, 02/03/2022    Pneumovax 23 06/19/2014    TDAP 06/19/2014       Influenza Annually   Pneumococcal if high risk   Td/Tdap once then every 10 years   HPV Males 11-21   Zoster (Shingles) 60 and older: one dose   Varicella 2 doses if not immune   MMR 1-2 doses if born after 1956 and not immune     Problem List[7]  PREVENTIVE VISIT,EST,40-64         [1]   Allergies  Allergen Reactions    Meperidine NAUSEA AND VOMITING and OTHER (SEE COMMENTS)    Penicillins UNKNOWN     As infant.    [2]   Current Outpatient Medications   Medication Sig Dispense Refill    atorvastatin 40 MG Oral Tab TAKE 1 TABLET BY MOUTH EVERY DAY AT NIGHT 90  tablet 0    FLUoxetine HCl 40 MG Oral Cap TAKE 1 CAPSULE (40 MG TOTAL) BY MOUTH DAILY. 90 capsule 0    PEG 3350-KCl-Na Bicarb-NaCl 420 g Oral Recon Soln Take as directed by physician 4000 mL 0    Phentermine HCl 37.5 MG Oral Tab Take 1 tablet (37.5 mg total) by mouth every morning. 30 tablet 2    topiramate 100 MG Oral Tab Take 1 tablet (100 mg total) by mouth 2 (two) times daily. 180 tablet 1    JARDIANCE 10 MG Oral Tab TAKE 1 TABLET BY MOUTH EVERY DAY 90 tablet 1    BD HYPODERMIC NEEDLE 18G X 1\" Does not apply Misc USE 1 NEEDLE TO DRAW UP ONCE WEEKLY      BD PLASTIPAK SYRINGE 3 ML Does not apply Misc Take 1 Bottle by mouth As Directed.      febuxostat 40 MG Oral Tab Take 1 tablet (40 mg total) by mouth daily. 90 tablet 3    NIFEdipine ER 60 MG Oral Tablet 24 Hr Take 1 tablet (60 mg total) by mouth daily. 90 tablet 3    ASPIRIN LOW DOSE 81 MG Oral Tab EC Take 1 tablet (81 mg total) by mouth daily.      BD LUER-COURT SYRINGE 23G X 1\" 3 ML Does not apply Misc USE 1 SYRINGE TO INJECT TESTOSTERONE WEEKLY      Testosterone cypionate 200 MG/ML Intramuscular Solution Inject 0.25 mL (50 mg total) into the muscle once a week.      Multiple Vitamin (THERA/BETA-CAROTENE) Oral Tab take 1 tablet by oral route  every day with food      Thiamine HCl 100 MG Oral Tab Take 1 tablet (100 mg total) by mouth daily. 30 tablet 0    Cholecalciferol (VITAMIN D) 2000 units Oral Tab Take 2,000 Units by mouth daily.      PEG 3350-KCl-Na Bicarb-NaCl 420 g Oral Recon Soln Take as directed by physician. (Patient not taking: Reported on 5/13/2025) 4000 mL 0   [3]   Past Medical History:   Abdominal distention    Abdominal hernia    Abdominal pain    Allergic rhinitis    Anxiety    Arthritis    Aspergillosis, with pneumonia (HCC)    RUL cavitary lesion 1.2015 sp voriconazole ID fu     Asthma (HCC)    Back pain    Black stools    Bladder cancer (HCC)    Bloating    Body piercing    Calculus of kidney    Cancer (HCC)    Cavitary lesion of lung     Chest pain    Chronic cough    Colitis    Constipation    Decorative tattoo    Diabetes mellitus (HCC)    Diarrhea, unspecified    Erythrocytosis    Esophageal reflux    Extrinsic asthma, unspecified    Fatigue    Food intolerance    Gout    Hemorrhoids    High blood pressure    High cholesterol    Histoplasmosis    Hyperproteinemia    Indigestion    Irregular bowel habits    Kidney stone    Sp lithotripsy may July 2013    Mycobacterial infection    2015    Nausea    Other and unspecified hyperlipidemia    Pain in joints    Pain with bowel movements    Pre-diabetes    Prediabetes    Reactive arthritis (HCC)    Resolved 7.2015    Renal disorder    Sleep disturbance    Stress    Trigger finger    Ulcerative colitis (HCC)    Uncomfortable fullness after meals    Unspecified essential hypertension    Unspecified sleep apnea    Visual impairment    Wears glasses    Weight loss   [4]   Past Surgical History:  Procedure Laterality Date    Colonoscopy  2014    Colonoscopy      Fusion of ankle joint      Hernia surgery      Lithotripsy      Umbilical hernia repair     [5]   Family History  Problem Relation Age of Onset    Diabetes Father     Colon Cancer Father     Other (mva) Father         many joint replacements from mva    Colon Polyps Mother     Colon Cancer Mother     Hypertension Mother     Other (htn) Mother     Other (uc) Mother     Colon Cancer Paternal Grandfather     Ovarian Cancer Maternal Grandmother     Alcohol and Other Disorders Associated Maternal Grandfather     Alcohol and Other Disorders Associated Maternal Aunt     Alcohol and Other Disorders Associated Paternal Aunt     Alcohol and Other Disorders Associated Maternal Uncle     Alcohol and Other Disorders Associated Paternal Uncle    [6]   Social History  Socioeconomic History    Marital status:    Tobacco Use    Smoking status: Every Day     Current packs/day: 0.50     Types: Cigarettes    Smokeless tobacco: Never   Substance and Sexual Activity     Alcohol use: Yes     Alcohol/week: 5.0 standard drinks of alcohol     Types: 5 Cans of beer per week    Drug use: No   [7]   Patient Active Problem List  Diagnosis    Ulcerative colitis (HCC)    HTN (hypertension)    Allergic rhinitis    Pre-diabetes    Asthma (HCC)    Kidney stone    Colon polyp    Hypogonadism male    Statin myopathy    Proteinuria    Obesity (BMI 30-39.9)    ED (erectile dysfunction)    Low libido    High risk medication use    Encounter for therapeutic drug monitoring    Polyarticular arthritis    Numbness in feet    Drug induced constipation    Hematospermia    Gout    Polycythemia    Depression, major, recurrent, mild    Dyslipidemia    Diarrhea    Diarrhea, unspecified type    Alcohol dependence with withdrawal (HCC)    Mixed hyperlipidemia    Abnormal LFTs    Anxiety disorder    Raynaud's disease without gangrene    Trigger index finger of left hand    History of adenomatous polyp of colon    diverticulosis    Family history of colon cancer    Colon polyps    Tobacco dependence    Overweight (BMI 25.0-29.9)    History of obesity    Essential hypertension    [id number]    Benign neoplasm of descending colon    Benign neoplasm of transverse colon    Benign neoplasm of sigmoid colon

## 2025-05-16 RX ORDER — FEBUXOSTAT 40 MG/1
40 TABLET, FILM COATED ORAL DAILY
Qty: 90 TABLET | Refills: 0 | OUTPATIENT
Start: 2025-05-16

## 2025-05-19 NOTE — PROGRESS NOTES
Usman Lopez. is a 46year old male presents today for follow-up on medical weight loss program for the treatment of overweight, obesity, or morbid obesity with HTN, prediabetes, hyperlipidemia, JAVID.    S:  Current weight Wt Readings from Last 6 Enc pedal edema.   GI: rotund, soft, +BS, no masses, HSM or tenderness  NEURO/MS: motor and sensory grossly intact  PSYCH: pleasant, cooperative, normal mood and affect    ASSESSMENT AND PLAN:  Therapeutic drug monitoring  (primary encounter diagnosis)  Severe office. These factors in the environment trigger our senses and other mental processes that make us think we are hungry even when we’re not. **The Hunger Rating Scale can help you decide if you are experiencing real hunger.   Remember that physical hunger if you have overeaten    6 = Comfortably full, satisfied    Neutral - 5    5 = Comfortable, neither hungry nor full   4 = Beginning signs and symptoms of hunger    3 = Hungry with several hunger symptoms, ready to eat    2 = Very hungry, unable to concentr max verbal cuing to maintain eyes closed/within normal limits

## 2025-06-21 ENCOUNTER — LABORATORY ENCOUNTER (OUTPATIENT)
Dept: LAB | Age: 60
End: 2025-06-21
Attending: FAMILY MEDICINE
Payer: COMMERCIAL

## 2025-06-21 DIAGNOSIS — Z12.5 SCREENING FOR MALIGNANT NEOPLASM OF PROSTATE: ICD-10-CM

## 2025-06-21 DIAGNOSIS — Z00.00 WELLNESS EXAMINATION: ICD-10-CM

## 2025-06-21 LAB
ALBUMIN SERPL-MCNC: 4.6 G/DL (ref 3.2–4.8)
ALBUMIN/GLOB SERPL: 1.8 {RATIO} (ref 1–2)
ALP LIVER SERPL-CCNC: 61 U/L (ref 45–117)
ALT SERPL-CCNC: 20 U/L (ref 10–49)
ANION GAP SERPL CALC-SCNC: 12 MMOL/L (ref 0–18)
AST SERPL-CCNC: 23 U/L (ref ?–34)
BASOPHILS # BLD AUTO: 0.09 X10(3) UL (ref 0–0.2)
BASOPHILS NFR BLD AUTO: 1.2 %
BILIRUB SERPL-MCNC: 0.5 MG/DL (ref 0.3–1.2)
BUN BLD-MCNC: 17 MG/DL (ref 9–23)
CALCIUM BLD-MCNC: 9.3 MG/DL (ref 8.7–10.6)
CHLORIDE SERPL-SCNC: 110 MMOL/L (ref 98–112)
CHOLEST SERPL-MCNC: 191 MG/DL (ref ?–200)
CO2 SERPL-SCNC: 20 MMOL/L (ref 21–32)
COMPLEXED PSA SERPL-MCNC: 1.07 NG/ML (ref ?–4)
CREAT BLD-MCNC: 1.47 MG/DL (ref 0.7–1.3)
EGFRCR SERPLBLD CKD-EPI 2021: 55 ML/MIN/1.73M2 (ref 60–?)
EOSINOPHIL # BLD AUTO: 0.33 X10(3) UL (ref 0–0.7)
EOSINOPHIL NFR BLD AUTO: 4.3 %
ERYTHROCYTE [DISTWIDTH] IN BLOOD BY AUTOMATED COUNT: 13.6 %
EST. AVERAGE GLUCOSE BLD GHB EST-MCNC: 123 MG/DL (ref 68–126)
FASTING PATIENT LIPID ANSWER: YES
FASTING STATUS PATIENT QL REPORTED: YES
GLOBULIN PLAS-MCNC: 2.6 G/DL (ref 2–3.5)
GLUCOSE BLD-MCNC: 100 MG/DL (ref 70–99)
HBA1C MFR BLD: 5.9 % (ref ?–5.7)
HCT VFR BLD AUTO: 46.4 % (ref 39–53)
HDLC SERPL-MCNC: 37 MG/DL (ref 40–59)
HGB BLD-MCNC: 15.2 G/DL (ref 13–17.5)
IMM GRANULOCYTES # BLD AUTO: 0.02 X10(3) UL (ref 0–1)
IMM GRANULOCYTES NFR BLD: 0.3 %
LDLC SERPL CALC-MCNC: 131 MG/DL (ref ?–100)
LYMPHOCYTES # BLD AUTO: 2.13 X10(3) UL (ref 1–4)
LYMPHOCYTES NFR BLD AUTO: 27.7 %
MCH RBC QN AUTO: 28.5 PG (ref 26–34)
MCHC RBC AUTO-ENTMCNC: 32.8 G/DL (ref 31–37)
MCV RBC AUTO: 87.1 FL (ref 80–100)
MONOCYTES # BLD AUTO: 0.55 X10(3) UL (ref 0.1–1)
MONOCYTES NFR BLD AUTO: 7.2 %
NEUTROPHILS # BLD AUTO: 4.56 X10 (3) UL (ref 1.5–7.7)
NEUTROPHILS # BLD AUTO: 4.56 X10(3) UL (ref 1.5–7.7)
NEUTROPHILS NFR BLD AUTO: 59.3 %
NONHDLC SERPL-MCNC: 154 MG/DL (ref ?–130)
OSMOLALITY SERPL CALC.SUM OF ELEC: 296 MOSM/KG (ref 275–295)
PLATELET # BLD AUTO: 223 10(3)UL (ref 150–450)
POTASSIUM SERPL-SCNC: 3.8 MMOL/L (ref 3.5–5.1)
PROT SERPL-MCNC: 7.2 G/DL (ref 5.7–8.2)
RBC # BLD AUTO: 5.33 X10(6)UL (ref 4.3–5.7)
SODIUM SERPL-SCNC: 142 MMOL/L (ref 136–145)
T4 FREE SERPL-MCNC: 1 NG/DL (ref 0.8–1.7)
TRIGL SERPL-MCNC: 128 MG/DL (ref 30–149)
TSI SER-ACNC: 1.41 UIU/ML (ref 0.55–4.78)
VLDLC SERPL CALC-MCNC: 23 MG/DL (ref 0–30)
WBC # BLD AUTO: 7.7 X10(3) UL (ref 4–11)

## 2025-06-21 PROCEDURE — 83036 HEMOGLOBIN GLYCOSYLATED A1C: CPT | Performed by: FAMILY MEDICINE

## 2025-06-21 PROCEDURE — 84153 ASSAY OF PSA TOTAL: CPT | Performed by: FAMILY MEDICINE

## 2025-06-21 PROCEDURE — 80050 GENERAL HEALTH PANEL: CPT | Performed by: FAMILY MEDICINE

## 2025-06-21 PROCEDURE — 84439 ASSAY OF FREE THYROXINE: CPT | Performed by: FAMILY MEDICINE

## 2025-06-21 PROCEDURE — 80061 LIPID PANEL: CPT | Performed by: FAMILY MEDICINE

## 2025-07-02 ENCOUNTER — OFFICE VISIT (OUTPATIENT)
Dept: INTERNAL MEDICINE CLINIC | Facility: CLINIC | Age: 60
End: 2025-07-02
Payer: COMMERCIAL

## 2025-07-02 VITALS
HEIGHT: 70 IN | SYSTOLIC BLOOD PRESSURE: 130 MMHG | BODY MASS INDEX: 31.21 KG/M2 | RESPIRATION RATE: 18 BRPM | HEART RATE: 76 BPM | DIASTOLIC BLOOD PRESSURE: 82 MMHG | WEIGHT: 218 LBS | OXYGEN SATURATION: 96 %

## 2025-07-02 DIAGNOSIS — E78.2 MIXED HYPERLIPIDEMIA: ICD-10-CM

## 2025-07-02 DIAGNOSIS — I10 ESSENTIAL HYPERTENSION: ICD-10-CM

## 2025-07-02 DIAGNOSIS — R73.03 PRE-DIABETES: ICD-10-CM

## 2025-07-02 DIAGNOSIS — Z51.81 ENCOUNTER FOR THERAPEUTIC DRUG MONITORING: Primary | ICD-10-CM

## 2025-07-02 DIAGNOSIS — E66.811 CLASS 1 OBESITY WITH SERIOUS COMORBIDITY AND BODY MASS INDEX (BMI) OF 31.0 TO 31.9 IN ADULT, UNSPECIFIED OBESITY TYPE: ICD-10-CM

## 2025-07-02 PROCEDURE — 99214 OFFICE O/P EST MOD 30 MIN: CPT | Performed by: NURSE PRACTITIONER

## 2025-07-02 PROCEDURE — 3075F SYST BP GE 130 - 139MM HG: CPT | Performed by: NURSE PRACTITIONER

## 2025-07-02 PROCEDURE — 3079F DIAST BP 80-89 MM HG: CPT | Performed by: NURSE PRACTITIONER

## 2025-07-02 PROCEDURE — 3008F BODY MASS INDEX DOCD: CPT | Performed by: NURSE PRACTITIONER

## 2025-07-02 RX ORDER — TOPIRAMATE 100 MG/1
100 TABLET, FILM COATED ORAL 2 TIMES DAILY
Qty: 180 TABLET | Refills: 1 | Status: SHIPPED | OUTPATIENT
Start: 2025-07-02

## 2025-07-02 RX ORDER — PHENTERMINE HYDROCHLORIDE 37.5 MG/1
37.5 TABLET ORAL EVERY MORNING
Qty: 30 TABLET | Refills: 3 | Status: SHIPPED | OUTPATIENT
Start: 2025-07-02

## 2025-07-02 NOTE — PATIENT INSTRUCTIONS
Continue making lifestyle changes that focus on good nutrition, regular exercise and stress management.    Medication Plan: Continue current medication regimen.     Agreed upon goal/s before next visit include:  Reset and refresh this summer! Begin to think about establishing a fitness routine once retired.    Developing a balanced fitness routine takes time. Begin to build the mentality of fitness 4 function! Start gradually and safely to continue to lose and maintain weight loss, build strength and prevent injury. Fitness not only supports a healthy body, but also a healthy mind with reducing stress and lifting your mood. I recommend a routine of 3x/week of cardio with varying intensity, 3x/week of strength training and 1x/week of stretching/flexibility/balance- such as Yoga.  Three ingredients necessary for making a habit of exercise for a lifetime includes: convenience, budget friendly and most importantly FUN! Changing up your exercise routine seasonally can keep you motivated and expose you to new interests and challenges! Step outside your comfort zone and give it a try, you never know where the challenge will lead you and what changes you may see!    Learning to Apply the FITT Principle to Your Exercise Plan  One of the biggest challenges with exercise is knowing where to start and how to get better. To improve your fitness, you must self-monitor your workouts and make changes when necessary. One of the best tools you can use to help you is the FITT Principle.  FITT is an acronym that outlines the basic components of a successful exercise plan.  Frequency - How often you exercise  Intensity - How hard you exercise  Time - How long you exercise  Type - What kind of exercise you do  How Often Should You Exercise?  It is a myth that you must work out for extended periods every day to lose weight and keep it off. What is considered an “effective” exercise varies between people. Factors that affect this include  age, fitness level, mobility, health conditions, etc.  Before you begin an exercise plan and decide how often to exercise, consider these key factors.  What is your current fitness level? What are you able to do?  What is your schedule? How much time do you have to exercise?  What health and fitness goals do you want to achieve?  Build your plan off of the answers to these questions. If you are a busy mom and you want to lose weight to gain more energy, you might not have a lot of time. Maybe your only form of exercise is keeping up with your kids. In this case, you may want to start small. For example, you could plan workouts for weekend afternoons when your spouse can watch the kids.  How Hard Should You Exercise?  The best way to see how hard you are working is to monitor your heart rate. You can do this by wearing a fitness tracker, heart rate monitor or smart watch. You can also feel for your heartbeat and count it over a 15-second period.  Low-intensity - An activity level you can continue for a long time (walking)  Moderate-intensity - An activity level that will boost your heart rate and require effort to maintain (biking)  High-intensity - An activity level that feels like an all-out effort. Your heart rate is high and you can't speak complete sentences between breaths  How Long Should You Exercise?  The time you spend exercising will usually depend on what you are doing. Health experts recommend at least 30 minutes of cardio exercise each workout. However, if you are doing a strength-based exercise, you will likely pay more attention to your number of “sets” and “reps.” Regardless, many other factors are involved.  The amount of time you have  How long it takes you to feel fatigued  Weather, time of day  Health conditions  What Should You Do for Exercise?  Should you hit up the elliptical at the gym? Should you go for a hike? The type of exercise you do depends on what you like and what results you  want.  For example, if you want to improve your cardio-vascular fitness and you love the outdoors, try exercises like hiking, swimming and biking. If you want to improve your muscle strength and you enjoy the convenience of the gym, try using free weights or machine weights. You can also use your body weight for exercises like push-ups, chin-ups, planks, etc.  The FITT Principle: Final Considerations  You can use the FITT Principle for cardio exercise, strength-based exercise, stretching and more. However, before you start any exercise plan, first consult with your healthcare provider. They will help you develop a plan that is safe and effective. By using the FITT Principle, you can not only improve your fitness level with time, but you can also prevent serious injury.      Build Muscle & Lose Fat  The great fat vs muscle diagram below paints a clear picture of why it’s so important for you to build muscle in order to lose fat.  Maybe you’ve wondered about muscle vs fat and why you need to build muscle to lose fat, look slim and keep the inches off.  Well, look no further! With the fat vs muscle diagram below you’ll see why healthy permanent weight loss requires you to build muscle to lose fat.  Fat vs Muscle Diagram  The facts are clear. The best way to lose fat and look slimmer is to build muscle. Since one picture’s worth a thousand words, here’s 5 lbs of muscle vs fat of the same weight. Notice 5 lbs of fat is three times bigger.    This means that if you were to build 5 lbs of muscle and lose 5 lbs of fat, you would weigh exactly the same, but look smaller and firmer.  So imagine if it were 25 lbs or 50 lbs of lost fat vs muscle gained.  This is why it’s possible for you to lose fat inches when exercising, yet show no change in scale weight. And can you see how firm the muscle looks compared to the lumpy, tapioca pudding consistency of fat?  Build Muscle to Lose Fat Benefits  Although daily physical activity,  like walking, swimming and aerobics are essential to good heart health and weight management, combining muscle building weight training with cardiovascular exercise, gives you an unbeatable combination to lose fat and keep it off permanently.  Of course it takes a few weeks before you see any measurable changes. But you’ll start to build muscle, lose fat and burn more calories from the moment you begin weight training. Building muscle helps you:  1. Burn more calories. Unlike fat, muscle beefs up your metabolism to help you burn about 70% more calories than fat can.  2. Improve appearance. When done properly, strength training can greatly improve your posture and help to prevent joint pain.  3. Build confidence. Strong muscles and joints increase your level of confidence in your abilities to perform many lifestyle activities.  4. Prevent injury. Strength training can build stronger muscles and more limber, flexible joints, which play a crucial role in preventing injury.  5. Increase bone density. Weight bearing activities improve your bone density and reduce bone loss. This helps to prevent osteoporosis.  Studies show that weight training combined with aerobics and stretching is the best way to build a strong, firm body and keep it that way.  So, if you want to look and feel better than ever, you need to build muscle to lose fat.     Taken from [email or web address] by Heath Horton

## 2025-07-02 NOTE — PROGRESS NOTES
You Guan Jr. is a 59 year old male presents today for follow-up on medical weight loss program for the treatment of overweight, obesity, or morbid obesity with HTN, prediabetes, hyperlipidemia, JAVID.    S:  Current weight   Wt Readings from Last 6 Encounters:   07/02/25 218 lb (98.9 kg)   05/13/25 213 lb (96.6 kg)   03/17/25 200 lb (90.7 kg)   11/18/24 208 lb (94.3 kg)   07/01/24 194 lb (88 kg)   05/06/24 198 lb (89.8 kg)    AND BMI Body mass index is 31.28 kg/m²..    Patient has lost -10# since LOV 3 months ago via VV and in clinic in 11/2024. He had stopped his medication of which he attributes to weight regain. Had increased hunger and snacking. Started back in the last 2 weeks. Will be retiring end of the summer. No longer has AOM coverage. Recent labs by PCP reviewed in EMR with worsening prediabetes noted. NO lifestyle log completed.    Social hx reviewed. Employed as a  with commute 45min-1 hour, out of the house by 5am. . Good spousal support.    PMH reviewed. Cardiac disorders: HTN, Hypothyroid: no, Mental illness: no, Glaucoma: no, Kidney stones: yes x1 Eating disorder: no, JAVID: yes-Bipap, Migraines: no, Seizures: no, Liver disease: no, Renal disease: no, Diabetes: prediabetes, Gastroparesis: no, Constipation: no, Joint pains: no, Cancer hx: no, FMH thyroid or pancreatic cancer: no. ED.    REVIEW OF SYSTEMS:  GENERAL: feels well otherwise  LUNGS: denies shortness of breath with exertion  CARDIOVASCULAR: denies chest pain on exertion, denies palpitations or pedal edema  GI: denies abdominal pain.  No N/V/D/C  MUSCULOSKELETAL: denies acute joint or muscle pain.  NEURO: denies headaches or dizziness  PSYCH: denies change in behavior or mood, grieving reported    EXAM:  /82   Pulse 76   Resp 18   Ht 5' 10\" (1.778 m)   Wt 218 lb (98.9 kg)   SpO2 96%   BMI 31.28 kg/m²    GENERAL: well developed, well nourished, in no apparent distress, obese  EYES: conjunctiva pink, sclera non  icteric  LUNGS: CTA, breathing non labored  CARDIO: RRR without murmur, normal S1 and S2 without clicks or gallops, no pedal edema.  GI: +BS  NEURO/MS: motor and sensory grossly intact  PSYCH: pleasant, cooperative, normal mood    ASSESSMENT AND PLAN:  Encounter Diagnoses   Name Primary?    Encounter for therapeutic drug monitoring Yes    Class 1 obesity with serious comorbidity and body mass index (BMI) of 31.0 to 31.9 in adult, unspecified obesity type     Essential hypertension     Mixed hyperlipidemia     Pre-diabetes              No orders of the defined types were placed in this encounter.      Meds & Refills for this Visit:  Requested Prescriptions     Signed Prescriptions Disp Refills    empagliflozin (JARDIANCE) 10 MG Oral Tab 90 tablet 1     Sig: Take 1 tablet (10 mg total) by mouth daily.    Phentermine HCl 37.5 MG Oral Tab 30 tablet 3     Sig: Take 1 tablet (37.5 mg total) by mouth every morning.    topiramate 100 MG Oral Tab 180 tablet 1     Sig: Take 1 tablet (100 mg total) by mouth 2 (two) times daily.       Imaging & Consults:  None      Plan:  Patient has lost -10# since LOV in 11/2025 on phentermine 37.5 mg daily, Topamax 100 mg BID, Jardiance 10 mg daily with a total weight loss of 72# since initial consult on 8/19/2014 with initial weight of 292#. Weight loss goal: weigh under 200#. CPM, Hx of Miralax and Colace without success. Hx of Metformin ER, Wegovy. Mood swings on Wellbutrin. No AOMs.  on establishing a fitness routine incorporating strength training to reduce insulin resistance. See patient instructions below for additional plans and patient counseling.      Patient Instructions   Continue making lifestyle changes that focus on good nutrition, regular exercise and stress management.    Medication Plan: Continue current medication regimen.     Agreed upon goal/s before next visit include:  Reset and refresh this summer! Begin to think about establishing a fitness routine once  retired.    Developing a balanced fitness routine takes time. Begin to build the mentality of fitness 4 function! Start gradually and safely to continue to lose and maintain weight loss, build strength and prevent injury. Fitness not only supports a healthy body, but also a healthy mind with reducing stress and lifting your mood. I recommend a routine of 3x/week of cardio with varying intensity, 3x/week of strength training and 1x/week of stretching/flexibility/balance- such as Yoga.  Three ingredients necessary for making a habit of exercise for a lifetime includes: convenience, budget friendly and most importantly FUN! Changing up your exercise routine seasonally can keep you motivated and expose you to new interests and challenges! Step outside your comfort zone and give it a try, you never know where the challenge will lead you and what changes you may see!    Learning to Apply the FITT Principle to Your Exercise Plan  One of the biggest challenges with exercise is knowing where to start and how to get better. To improve your fitness, you must self-monitor your workouts and make changes when necessary. One of the best tools you can use to help you is the FITT Principle.  FITT is an acronym that outlines the basic components of a successful exercise plan.  Frequency - How often you exercise  Intensity - How hard you exercise  Time - How long you exercise  Type - What kind of exercise you do  How Often Should You Exercise?  It is a myth that you must work out for extended periods every day to lose weight and keep it off. What is considered an “effective” exercise varies between people. Factors that affect this include age, fitness level, mobility, health conditions, etc.  Before you begin an exercise plan and decide how often to exercise, consider these key factors.  What is your current fitness level? What are you able to do?  What is your schedule? How much time do you have to exercise?  What health and fitness  goals do you want to achieve?  Build your plan off of the answers to these questions. If you are a busy mom and you want to lose weight to gain more energy, you might not have a lot of time. Maybe your only form of exercise is keeping up with your kids. In this case, you may want to start small. For example, you could plan workouts for weekend afternoons when your spouse can watch the kids.  How Hard Should You Exercise?  The best way to see how hard you are working is to monitor your heart rate. You can do this by wearing a fitness tracker, heart rate monitor or smart watch. You can also feel for your heartbeat and count it over a 15-second period.  Low-intensity - An activity level you can continue for a long time (walking)  Moderate-intensity - An activity level that will boost your heart rate and require effort to maintain (biking)  High-intensity - An activity level that feels like an all-out effort. Your heart rate is high and you can't speak complete sentences between breaths  How Long Should You Exercise?  The time you spend exercising will usually depend on what you are doing. Health experts recommend at least 30 minutes of cardio exercise each workout. However, if you are doing a strength-based exercise, you will likely pay more attention to your number of “sets” and “reps.” Regardless, many other factors are involved.  The amount of time you have  How long it takes you to feel fatigued  Weather, time of day  Health conditions  What Should You Do for Exercise?  Should you hit up the elliptical at the gym? Should you go for a hike? The type of exercise you do depends on what you like and what results you want.  For example, if you want to improve your cardio-vascular fitness and you love the outdoors, try exercises like hiking, swimming and biking. If you want to improve your muscle strength and you enjoy the convenience of the gym, try using free weights or machine weights. You can also use your body  weight for exercises like push-ups, chin-ups, planks, etc.  The FITT Principle: Final Considerations  You can use the FITT Principle for cardio exercise, strength-based exercise, stretching and more. However, before you start any exercise plan, first consult with your healthcare provider. They will help you develop a plan that is safe and effective. By using the FITT Principle, you can not only improve your fitness level with time, but you can also prevent serious injury.      Build Muscle & Lose Fat  The great fat vs muscle diagram below paints a clear picture of why it’s so important for you to build muscle in order to lose fat.  Maybe you’ve wondered about muscle vs fat and why you need to build muscle to lose fat, look slim and keep the inches off.  Well, look no further! With the fat vs muscle diagram below you’ll see why healthy permanent weight loss requires you to build muscle to lose fat.  Fat vs Muscle Diagram  The facts are clear. The best way to lose fat and look slimmer is to build muscle. Since one picture’s worth a thousand words, here’s 5 lbs of muscle vs fat of the same weight. Notice 5 lbs of fat is three times bigger.    This means that if you were to build 5 lbs of muscle and lose 5 lbs of fat, you would weigh exactly the same, but look smaller and firmer.  So imagine if it were 25 lbs or 50 lbs of lost fat vs muscle gained.  This is why it’s possible for you to lose fat inches when exercising, yet show no change in scale weight. And can you see how firm the muscle looks compared to the lumpy, tapioca pudding consistency of fat?  Build Muscle to Lose Fat Benefits  Although daily physical activity, like walking, swimming and aerobics are essential to good heart health and weight management, combining muscle building weight training with cardiovascular exercise, gives you an unbeatable combination to lose fat and keep it off permanently.  Of course it takes a few weeks before you see any measurable  changes. But you’ll start to build muscle, lose fat and burn more calories from the moment you begin weight training. Building muscle helps you:  1. Burn more calories. Unlike fat, muscle beefs up your metabolism to help you burn about 70% more calories than fat can.  2. Improve appearance. When done properly, strength training can greatly improve your posture and help to prevent joint pain.  3. Build confidence. Strong muscles and joints increase your level of confidence in your abilities to perform many lifestyle activities.  4. Prevent injury. Strength training can build stronger muscles and more limber, flexible joints, which play a crucial role in preventing injury.  5. Increase bone density. Weight bearing activities improve your bone density and reduce bone loss. This helps to prevent osteoporosis.  Studies show that weight training combined with aerobics and stretching is the best way to build a strong, firm body and keep it that way.  So, if you want to look and feel better than ever, you need to build muscle to lose fat.     Taken from [email or web address] by Heath Horton        Medication use and SEs reviewed with patient.    Return in about 4 months (around 11/2/2025) for weight management via clinic or Telemedicine Visit and clinic in Jan.    Patient verbalizes understanding.    DOCUMENTATION OF TIME SPENT: Code selection for this visit was based on time spent : 30 minutes on date of service in preparing to see the patient, obtaining and/or reviewing separately obtained history, performing a medically appropriate examination, counseling and educating the patient/family/caregiver, ordering medications or testing, referring and communicating with other healthcare providers, documenting clinical information in the electronic medical record, independently interpreting results and communicating results to the patient/family/caregiver and care coordination with the patient's other providers.

## 2025-08-10 DIAGNOSIS — E78.5 DYSLIPIDEMIA: ICD-10-CM

## 2025-08-14 RX ORDER — ATORVASTATIN CALCIUM 40 MG/1
40 TABLET, FILM COATED ORAL NIGHTLY
Qty: 90 TABLET | Refills: 0 | Status: SHIPPED | OUTPATIENT
Start: 2025-08-14

## 2025-08-14 RX ORDER — FLUOXETINE HYDROCHLORIDE 40 MG/1
40 CAPSULE ORAL DAILY
Qty: 90 CAPSULE | Refills: 0 | Status: SHIPPED | OUTPATIENT
Start: 2025-08-14

## (undated) DIAGNOSIS — Z01.818 PRE-PROCEDURAL EXAMINATION: Primary | ICD-10-CM

## (undated) DIAGNOSIS — Z51.81 ENCOUNTER FOR THERAPEUTIC DRUG MONITORING: ICD-10-CM

## (undated) DIAGNOSIS — I10 ESSENTIAL HYPERTENSION: ICD-10-CM

## (undated) DIAGNOSIS — E66.01 SEVERE OBESITY WITH BODY MASS INDEX (BMI) OF 35.0 TO 39.9 WITH SERIOUS COMORBIDITY (HCC): ICD-10-CM

## (undated) DIAGNOSIS — R73.03 PRE-DIABETES: ICD-10-CM

## (undated) DIAGNOSIS — Z11.59 SCREENING FOR VIRAL DISEASE: ICD-10-CM

## (undated) DIAGNOSIS — E78.1 HYPERTRIGLYCERIDEMIA: ICD-10-CM

## (undated) NOTE — ED AVS SNAPSHOT
Roula Garcia. MRN: [id number]    Department:  [facility] Emergency Department   Date of Visit:  11/28/2018           Disclosure     Insurance plans vary and the physician(s) referred by the ER may not be covered by your plan.  Please conta tell this physician (or your personal doctor if your instructions are to return to your personal doctor) about any new or lasting problems. The primary care or specialist physician will see patients referred from the [facility] Emergency Department.  Lashanda Hall

## (undated) NOTE — Clinical Note
Dear Dr. Johnny Islas,       Thank you for referring Bushra Castano to the [facility].   Sincerely,  MG Mitchell

## (undated) NOTE — LETTER
ASTHMA ACTION PLAN for Inge Graves.      : 1965     Date: 2019  Provider:  Yana Kidd MD  Phone for doctor or clinic: [facility], [address] [place] [address] , [address] [facility]  [phone]

## (undated) NOTE — LETTER
ASTHMA ACTION PLAN for Meme Beltran.      : 1965     Date: 2017  Provider:  Shaq Tejeda MD  Phone for doctor or clinic: [facility], [address] [facility] , [address]  [phone]

## (undated) NOTE — ED AVS SNAPSHOT
Ronak Lewis. MRN: [id number]    Department:  [facility] Emergency Department in [place]   Date of Visit:  2/5/2018           Disclosure     Insurance plans vary and the physician(s) referred by the ER may not be covered by your plan.  Please co tell this physician (or your personal doctor if your instructions are to return to your personal doctor) about any new or lasting problems. The primary care or specialist physician will see patients referred from the [facility] Emergency Department.  Carisa Walter

## (undated) NOTE — ED AVS SNAPSHOT
Juanumm Puente. MRN: [id number]    Department:  [address] [facility] in [place]   Date of Visit:  8/10/2019           Disclosure     Insurance plans vary and the physician(s) referred by the ER may not be covered by your plan.  Please c tell this physician (or your personal doctor if your instructions are to return to your personal doctor) about any new or lasting problems. The primary care or specialist physician will see patients referred from the [facility] Emergency Department.  Stan Navarrete

## (undated) NOTE — LETTER
ASTHMA ACTION PLAN for Jacob Porras. : 1965     Date: 2023  Provider:  Sandy Arnold MD  Phone for doctor or clinic: [facility], [address] [facility] , [address] [address] [phone]    ACT Score: 25      You can use the colors of a traffic light to help learn about your asthma medicines. 1. Green - Go! % of Personal Best Peak Flow Use controller medicine. Breathing is good  No cough or wheeze  Can work and play Medicine How much to take When to take it    No regularly scheduled daily medications        2. Yellow - Caution. 50-79% Personal Best Peak  Flow. Use reliever medicine to keep an asthma attack from getting bad. Cough  Wheezing  Tight Chest  Wake up at night Medicine How much to take When to take it    No regularly scheduled daily medications         Additional instructions         3. Red - Stop! Danger!  <50% Personal Best Peak  Flow. Take these medications until  Get help from a doctor   Medicine not helping  Breathing is hard and fast  Nose opens wide  Can't walk  Ribs show  Can't talk well Medicine How much to take When to take it    Call 911, go to nearest er, call your doctor     Additional Instructions If your symptoms do not improve and you cannot contact your doctor, go to [facility] room or call 911 immediately! [x] Asthma Action Plan reviewed with patient (and caregiver if necessary) and a copy of the plan was given to the patient/caregiver. [] Asthma Action Plan reviewed with patient (and caregiver if necessary) on the phone and mailed copy to patient or submitted via [address]      Signatures:  Provider  Sandy Arnold MD   Patient Caretaker

## (undated) NOTE — MR AVS SNAPSHOT
[address] [phone]               Thank you for choosing us for your health care visit with Yvonne Reddy MD.  We are glad to serve you and happy to provide you with this summary of Generic drug:  aspirin   Take 81 mg by mouth nightly. Empagliflozin 10 MG Tabs   Take 10 mg by mouth daily.    Commonly known as:  JARDIANCE           losartan 100 MG Tabs   TAKE 1 TABLET BY MOUTH EVERY DAY   Commonly known as:  Ricky White You can access your MyChart to more actively manage your health care and view more details from this visit by going to [email or web address].   If you've recently had a stay at the Hospital you can access your discharge instructions in Clement Gram by ben

## (undated) NOTE — MR AVS SNAPSHOT
[address] [phone]               Thank you for choosing us for your health care visit with Michele Ryan MD.  We are glad to serve you and happy to provide you with this summary of * Empagliflozin 10 MG Tabs   Take 10 mg by mouth daily. What changed:  Another medication with the same name was added. Make sure you understand how and when to take each.    Commonly known as:  JARDIANCE           * Empagliflozin 25 MG Tabs   Take 25 mg Lorene Wells [phone], [phone]  [facility], [address] [place] [address] Road     Phone:  [phone]    - Empagliflozin 25 MG Tabs      You can get these medications from any pharmacy     Bring a paper prescription for each of these medications    - Phentermin

## (undated) NOTE — Clinical Note
Filomena Angulo! Thank you for referring Mr. Ericka Caldwell. for rheumatologic evaluation. Please see the discussion portion of my note and let me know if you have any questions.  I think he would benefit from calcium channel blocker and not sure if he needs

## (undated) NOTE — MR AVS SNAPSHOT
[address] [phone]               Thank you for choosing us for your health care visit with Argenis Burton MD.  We are glad to serve you and happy to provide you with this summary of Empagliflozin 25 MG Tabs   Take 25 mg by mouth daily.    Commonly known as:  JARDIANCE           losartan 100 MG Tabs   TAKE 1 TABLET BY MOUTH EVERY DAY   Commonly known as:  COZAAR           MetFORMIN HCl  MG Tb24   Take 3 tablets (2,250 mg total) b - Phentermine HCl 15 MG Caps            MyChart     Visit MyChart  You can access your MyChart to more actively manage your health care and view more details from this visit by going to [email or web address].   If you've recently had a stay at the [facility] (DP/SNF)

## (undated) NOTE — ED AVS SNAPSHOT
Ruby Rincon. MRN: [id number]    Department:  [address] [facility] Department in [place]   Date of Visit:  1/15/2020           Disclosure     Insurance plans vary and the physician(s) referred by the ER may not be covered by your plan.  Please c tell this physician (or your personal doctor if your instructions are to return to your personal doctor) about any new or lasting problems. The primary care or specialist physician will see patients referred from the [facility] Emergency Department.  Raul Hernandez

## (undated) NOTE — LETTER
10/12/17        [phone]      Dear Ada Vazquez,    [facility] records indicate that you have outstanding lab work and or testing that was ordered for you and has not yet been completed:          CBC W Differential W

## (undated) NOTE — LETTER
ASTHMA ACTION PLAN for Armida Puente.      : 1965     Date: 2020  Provider:  Gina Olivo MD  Phone for doctor or clinic: [facility], [address] [place] [address] , [address] [facility]  [phone]

## (undated) NOTE — MR AVS SNAPSHOT
[facility] [address]  Merom Dycole Laughter  [phone]               Thank you for choosing us for your health care visit with Jorge Menendez PA-C.   We are glad to serve you and happy to provide you Brielle Hackett, [address]   Phone:  [phone]   Fax:  [phone]    Diagnoses:  Right elbow pain   Order:  Orthopedic - Internal    Dayna Lapine, Sharyle Argue, [address] [address] [email or web address] and type in [facility] and follow the links for \"SCHEDULE ONLINE NOW\"    •The Lab is here [address] Mon, Tues, Friday 8am-4pm in office, Wed and Thurs 7am-3pm plus most Saturday 830am-12p. call [phone] to schedule  •To Please allow our office 5 business days to contact you regarding any testing results. Refill policies:   Allow 3 business days for refills; controlled substances may take longer and must be picked up from the office in person.   Narcotic medications can What changed:  Another medication with the same name was removed. Continue taking this medication, and follow the directions you see here.            PROAIR  ([address]) MCG/ACT Aers   Generic drug:  Albuterol Sulfate HFA   Inhale 2 puffs into the lung

## (undated) NOTE — MR AVS SNAPSHOT
[address] [phone]               Thank you for choosing us for your health care visit with Lakeshia Mosley MD.  We are glad to serve you and happy to provide you with this summary of Take 1 tablet (37.5 mg total) by mouth every morning before breakfast.   What changed: You were already taking a medication with the same name, and this prescription was added. Make sure you understand how and when to take each.    Commonly known as:  ADIP office, you can view your past visit information in [email or web address] by going to Visits < Visit Summaries. [employer] questions? Call [phone] for help. [employer] is NOT to be used for urgent needs. For medical emergencies, dial 911.         Educational Inform

## (undated) NOTE — LETTER
01/10/18        [phone]      Dear Lisseth Lopez,    [facility] records indicate that you have outstanding lab work and or testing that was ordered for you and has not yet been completed:          CBC W Differential W